# Patient Record
Sex: MALE | Race: BLACK OR AFRICAN AMERICAN | NOT HISPANIC OR LATINO | Employment: FULL TIME | URBAN - METROPOLITAN AREA
[De-identification: names, ages, dates, MRNs, and addresses within clinical notes are randomized per-mention and may not be internally consistent; named-entity substitution may affect disease eponyms.]

---

## 2017-07-21 ENCOUNTER — TELEPHONE (OUTPATIENT)
Dept: PHYSICAL MEDICINE AND REHAB | Facility: CLINIC | Age: 27
End: 2017-07-21

## 2017-07-21 NOTE — TELEPHONE ENCOUNTER
Call placed. Spoke with Gurpreet. He states on 12/23/14 Dr. Cabrera gave him a letter of clearance to return to work after his concussion. His place of employment has lost this documentation and they are requesting a new letter stating Gurpreet was cleared on 12/23/14. He would like this emailed to him at [email or web address].

## 2017-07-25 ENCOUNTER — TELEPHONE (OUTPATIENT)
Dept: PHYSICAL MEDICINE AND REHAB | Facility: CLINIC | Age: 27
End: 2017-07-25

## 2017-07-25 NOTE — TELEPHONE ENCOUNTER
----- Message from Kiarra Claros sent at 7/25/2017 11:03 AM CDT -----  Contact: Patient  Patient is returning your call. Please call him at [phone].

## 2017-07-25 NOTE — TELEPHONE ENCOUNTER
Call placed. Spoke with Gurpreet. He's requested an updated note on script pad stating he was cleared to return to work on 12/23/14 without restrictions. I will have DR Cabrera rewrite script and email it to him. [email or web address]. He verbalized understanding.

## 2018-04-08 ENCOUNTER — HOSPITAL ENCOUNTER (EMERGENCY)
Facility: HOSPITAL | Age: 28
Discharge: HOME | End: 2018-04-08
Attending: EMERGENCY MEDICINE
Payer: COMMERCIAL

## 2018-04-08 VITALS
SYSTOLIC BLOOD PRESSURE: 113 MMHG | DIASTOLIC BLOOD PRESSURE: 78 MMHG | HEIGHT: 66 IN | RESPIRATION RATE: 12 BRPM | HEART RATE: 73 BPM | TEMPERATURE: 99 F | WEIGHT: 150 LBS | OXYGEN SATURATION: 97 % | BODY MASS INDEX: 24.11 KG/M2

## 2018-04-08 DIAGNOSIS — M54.50 ACUTE LEFT-SIDED LOW BACK PAIN WITHOUT SCIATICA: Primary | ICD-10-CM

## 2018-04-08 PROCEDURE — 99283 EMERGENCY DEPT VISIT LOW MDM: CPT

## 2018-04-08 RX ORDER — ORPHENADRINE CITRATE 100 MG/1
100 TABLET, EXTENDED RELEASE ORAL 2 TIMES DAILY
Qty: 15 TABLET | Refills: 0 | Status: SHIPPED | OUTPATIENT
Start: 2018-04-08 | End: 2018-04-18

## 2018-04-08 RX ORDER — IBUPROFEN 600 MG/1
600 TABLET ORAL EVERY 6 HOURS PRN
Qty: 15 TABLET | Refills: 0 | OUTPATIENT
Start: 2018-04-08 | End: 2019-02-23

## 2018-04-09 NOTE — ED NOTES
Pt presents to the ED w/ c/o of lower back pain for the past 3-4days. Pt denies injury to back or lifting heavy objects. Reports pain is relieved by laying down or sitting up. Reports was given shot and the pain was relieved. Pt denies taking any medication to relieve the pain today. Reports has a hx of herniated disc.

## 2018-04-09 NOTE — ED PROVIDER NOTES
Encounter Date: 4/8/2018    SCRIBE #1 NOTE: I, Sheyla Reji, am scribing for, and in the presence of, Dr. Jim Glod.       History     Chief Complaint   Patient presents with    Back Pain     reports lower back pain X3-4 days. Denies any injury, or lifting any heavy objects. States pain is relieved by sitting/ laying down. states X 1 year ago was see by specialist and dx with possible herinated disc. Reports feels like the same pain. Pt states was given steroid shot, which relieved pain.      This is a 27 y.o. male who  has a past medical history of Anxiety; Concussion; Fatigue; Headache(784.0); and PTSD (post-traumatic stress disorder).    Time seen by provider: 8:54 PM    This patient presents to the emergency department today with complaint of lower back pain just to the left of the spine onset 3-4 days ago. He does not do any lifting that would strain his back and he does not recall any injury or trauma that may have caused his pain. The patient has a history of similar pain and last year he had an MRI which showed herniated or bulging disc. His pain is made better with sitting up straight or lying flat. His pain is made worse by standing too long or by slouching. The patient denies radiating pain to the leg, sides, or abdomen, dysuria, fever, chills, nausea. He has not tried any medications at home for his pain.    Patient has no past surgical history on file.       The history is provided by the patient.     Review of patient's allergies indicates:  No Known Allergies  Past Medical History:   Diagnosis Date    Anxiety     Concussion     Fatigue     Headache(784.0)     PTSD (post-traumatic stress disorder)      History reviewed. No pertinent surgical history.  Family History   Problem Relation Age of Onset    No Known Problems Mother     No Known Problems Sister     No Known Problems Brother     Melanoma Neg Hx     Psoriasis Neg Hx     Lupus Neg Hx      Social History   Substance Use Topics     Smoking status: Never Smoker    Smokeless tobacco: Never Used    Alcohol use No     Review of Systems   Constitutional: Negative for chills and fever.   HENT: Negative for congestion and sore throat.    Eyes: Negative for pain and redness.   Respiratory: Negative for cough and shortness of breath.    Cardiovascular: Negative for chest pain and leg swelling.   Gastrointestinal: Negative for abdominal pain, diarrhea, nausea and vomiting.   Genitourinary: Negative for dysuria and flank pain.   Musculoskeletal: Positive for back pain. Negative for neck pain.   Skin: Negative for rash and wound.   Neurological: Negative for weakness and numbness.       Physical Exam     Initial Vitals [04/08/18 2032]   BP Pulse Resp Temp SpO2   113/78 73 12 98.5 °F (36.9 °C) 97 %      MAP       89.67         Physical Exam    Nursing note and vitals reviewed.  Constitutional: He appears well-developed and well-nourished. He is not diaphoretic. No distress.   HENT:   Head: Normocephalic and atraumatic.   Eyes: EOM are normal. Pupils are equal, round, and reactive to light.   Neck: Normal range of motion. Neck supple.   Cardiovascular: Normal rate, regular rhythm and normal heart sounds. Exam reveals no gallop and no friction rub.    No murmur heard.  Pulmonary/Chest: Breath sounds normal. He has no wheezes. He has no rhonchi. He has no rales.   Abdominal: Soft. He exhibits no distension. There is no tenderness.   Musculoskeletal: Normal range of motion. He exhibits no edema.   Tenderness to the left lower lumbar paraspinous muscle. No vertebral tenderness.   Neurological: He is alert and oriented to person, place, and time. He has normal strength and normal reflexes. No sensory deficit.   Skin: Skin is warm and dry. No rash noted. No erythema.   Psychiatric: He has a normal mood and affect.         ED Course   Procedures  Labs Reviewed - No data to display          Medical Decision Making:   ED Management:  27-year-old male with lower  back pain.  No known injury.  Patient has tenderness of the left lower lumbar paraspinous muscle.  He has no neurologic deficits.  I will place him on ibuprofen and Norflex for this.  I have suggested he follow up with his primary physician when able for recheck and further treatment as warranted.                      Clinical Impression:     1. Acute left-sided low back pain without sciatica         Disposition:   Disposition: Discharged    I, Dr. Jim Mosher, personally performed the services described in this documentation. All medical record entries made by the scribe were at my direction and in my presence. I have reviewed the chart and agree that the record reflects my personal performance and is accurate and complete. Jim Mosher MD.  9:36 PM 04/08/2018                     Jim Mosher MD  04/08/18 [id number]

## 2019-02-22 ENCOUNTER — HOSPITAL ENCOUNTER (EMERGENCY)
Facility: HOSPITAL | Age: 29
Discharge: HOME | End: 2019-02-23
Attending: EMERGENCY MEDICINE
Payer: COMMERCIAL

## 2019-02-22 DIAGNOSIS — M54.2 NECK PAIN: Primary | ICD-10-CM

## 2019-02-22 PROCEDURE — 63600175 PHARM REV CODE 636 W HCPCS: Performed by: EMERGENCY MEDICINE

## 2019-02-22 PROCEDURE — 99284 EMERGENCY DEPT VISIT MOD MDM: CPT | Mod: 25

## 2019-02-22 PROCEDURE — 96372 THER/PROPH/DIAG INJ SC/IM: CPT

## 2019-02-22 RX ORDER — KETOROLAC TROMETHAMINE 30 MG/ML
30 INJECTION, SOLUTION INTRAMUSCULAR; INTRAVENOUS
Status: COMPLETED | OUTPATIENT
Start: 2019-02-22 | End: 2019-02-22

## 2019-02-22 RX ADMIN — KETOROLAC TROMETHAMINE 30 MG: 30 INJECTION, SOLUTION INTRAMUSCULAR at 11:02

## 2019-02-23 VITALS
HEART RATE: 56 BPM | BODY MASS INDEX: 24.11 KG/M2 | WEIGHT: 150 LBS | RESPIRATION RATE: 20 BRPM | SYSTOLIC BLOOD PRESSURE: 115 MMHG | HEIGHT: 66 IN | TEMPERATURE: 98 F | OXYGEN SATURATION: 96 % | DIASTOLIC BLOOD PRESSURE: 75 MMHG

## 2019-02-23 RX ORDER — CYCLOBENZAPRINE HCL 10 MG
10 TABLET ORAL NIGHTLY PRN
Qty: 7 TABLET | Refills: 0 | Status: SHIPPED | OUTPATIENT
Start: 2019-02-23 | End: 2019-02-28

## 2019-02-23 RX ORDER — IBUPROFEN 600 MG/1
600 TABLET ORAL EVERY 6 HOURS PRN
Qty: 42 TABLET | Refills: 0 | Status: SHIPPED | OUTPATIENT
Start: 2019-02-23 | End: 2019-03-22 | Stop reason: SDUPTHER

## 2019-02-23 NOTE — ED PROVIDER NOTES
Encounter Date: 2/22/2019    SCRIBE #1 NOTE: I, Keily Erazo, am scribing for, and in the presence of,  Dr. Peraza. I have scribed the entire note.       History     Chief Complaint   Patient presents with    Neck Pain     Patient presents to the ED with reports of having right sided neck pain that radiates down the right arm. Pain is described as aching. States having hx of disc problems and had similar episode October 2018. States he was treated with an injection.      Gurpreet Youngblood is a 28 y.o. male who  has a past medical history of Anxiety, Concussion, Fatigue, Headache(784.0), and PTSD (post-traumatic stress disorder).    The patient presents to the ED due to right sided neck pain that radiates down right arm since 12:30pm this afternoon. He reports he was sitting down eating when the pain occurred. He denies any numbness, weakness, or tingling sensation. He denies any chest pain, SOB, fever, nausea, headache or vomiting. The patient reports of of similar previous episodes and diagnosed with a herniated disc. He states he was treated with an injection. He has no known allergies. He has no other complaint at this time.          Review of patient's allergies indicates:  No Known Allergies  Past Medical History:   Diagnosis Date    Anxiety     Concussion     Fatigue     Headache(784.0)     PTSD (post-traumatic stress disorder)      Past Surgical History:   Procedure Laterality Date    EXAM UNDER ANESTHESIA N/A 4/29/2016    Performed [facility] at [facility] OR [facility]    FISTULOTOMY-RECTAL N/A 4/29/2016    Performed by [facility] at [facility] OR [facility]     Family History   Problem Relation Age of Onset    No Known Problems Mother     No Known Problems Sister     No Known Problems Brother     Melanoma Neg Hx     Psoriasis Neg Hx     Lupus Neg Hx      Social History     Tobacco Use    Smoking status: Never Smoker    Smokeless tobacco: Never Used   Substance Use Topics    Alcohol  use: No     Alcohol/week: 0.0 oz    Drug use: No     Review of Systems   Constitutional: Negative for chills and fever.   HENT: Negative for sore throat.    Respiratory: Negative for shortness of breath.    Cardiovascular: Negative for chest pain.   Gastrointestinal: Negative for constipation, diarrhea, nausea and vomiting.   Genitourinary: Negative for dysuria, frequency and urgency.   Musculoskeletal: Positive for neck pain. Negative for back pain.        (+) right arm pain   Skin: Negative for rash and wound.   Neurological: Negative for weakness and headaches.   Hematological: Does not bruise/bleed easily.   Psychiatric/Behavioral: Negative for agitation, behavioral problems and confusion.       Physical Exam     Initial Vitals [02/22/19 2240]   BP Pulse Resp Temp SpO2   132/81 62 18 98.7 °F (37.1 °C) 100 %      MAP       --         Physical Exam    Nursing note and vitals reviewed.  Constitutional: He appears well-developed and well-nourished. He is not diaphoretic. No distress.   HENT:   Head: Normocephalic and atraumatic.   Mouth/Throat: Oropharynx is clear and moist.   Eyes: EOM are normal. Pupils are equal, round, and reactive to light.   Neck: No tracheal deviation present.   Cardiovascular: Normal rate, regular rhythm, normal heart sounds and intact distal pulses.   Good radial and ulnar pulses   Pulmonary/Chest: Breath sounds normal. No stridor. No respiratory distress.   Abdominal: Soft. He exhibits no distension and no mass. There is no tenderness.   Musculoskeletal: Normal range of motion. He exhibits no edema.   Neurological: He is alert and oriented to person, place, and time. No cranial nerve deficit or sensory deficit.   Sensation intact to light touch to b/l upper extremities    Equal  strength bilaterally    Diffuse lateral c spine tenderness/ ttp to right arm, no forearm tenderness       Skin: Skin is warm and dry. Capillary refill takes less than 2 seconds. No rash noted.   Psychiatric:  He has a normal mood and affect. His behavior is normal. Thought content normal.         ED Course   Procedures  Labs Reviewed - No data to display       Imaging Results          CT Cervical Spine Without Contrast (Final result)  Result time 02/23/19 01:16:35    Final result by Danish Topete MD (02/23/19 01:16:35)                 Impression:      No CT evidence of acute fracture or traumatic subluxation of the cervical spine.      Electronically signed by: Danish Topete MD  Date:    02/23/2019  Time:    01:16             Narrative:    EXAMINATION:  CT CERVICAL SPINE WITHOUT CONTRAST    CLINICAL HISTORY:  Neck pain, first study;    TECHNIQUE:  Low dose axial images, sagittal and coronal reformations were performed though the cervical spine.  Contrast was not administered.    COMPARISON:  MRI cervical spine 12/10/2014    FINDINGS:  Cervical spine alignment appears within normal limits.  Cervical vertebral body heights are well maintained.  There is no acute fracture.  Intervertebral disc spaces appear within normal limits.  There is no CT evidence of significant spinal canal stenosis or neural foraminal narrowing.    The prevertebral soft tissues appear within normal limits.  The remaining visualized soft tissue structures of the neck are unremarkable.  Visualized intracranial structures at the skullbase appear within normal limits.  The visualized airway and lung apices are unremarkable.                                 Medical Decision Making:   Differential Diagnosis:   Differential Diagnosis includes, but is not limited to:  Fracture, dislocation, nerve injury/palsy, vascular injury, muscle strain, ligament tear/sprain, muscle spasm abrasion, soft tissue contusion, osteoarthritis.    Clinical Tests:   Radiological Study: Ordered and Reviewed  ED Management:  CT negative for acute pathology    I do not suspect emergent pathology, neuro exam is intact, patient improved after toradol    Will treat for muscle  strain    Advised he will need to follow up with PCP if he has persistent pain    Return precautions for numbness, weakness or any other concern.    After taking into careful account the historical factors and physical exam findings of the patient's presentation today, in conjunction with the empirical and objective data obtained on ED workup, no acute emergent medical condition has been identified. The patient appears to be low risk for an emergent medical condition and I feel it is safe and appropriate at this time for the patient to be discharged to follow-up as detailed in their discharge instructions for reevaluation and possible continued outpatient workup and management. I have discussed the specifics of the workup with the patient and the patient has verbalized understanding of the details of the workup, the diagnosis, the treatment plan, and the need for outpatient follow-up.  Although the patient has no emergent etiology today this does not preclude the development of an emergent condition so in addition, I have advised the patient that they can return to the ED and/or activate EMS at any time with worsening of their symptoms, change of their symptoms, or with any other medical complaint.  The patient remained comfortable and stable during their visit in the ED.  Discharge and follow-up instructions discussed with the patient who expressed understanding and willingness to comply with my recommendations.                        Clinical Impression:     1. Neck pain        Disposition:   Disposition: Discharged  Condition: Stable    I, Zack Peraza,  personally performed the services described in this documentation. All medical record entries made by the scribe were at my direction and in my presence.  I have reviewed the chart and agree that the record reflects my personal performance and is accurate and complete. Zack Peraza M.D. 5:03 PM02/25/2019 Scribe attestation                    Zack Peraza  MD Jose  02/25/19 [id number]

## 2019-02-23 NOTE — ED TRIAGE NOTES
Patient presents to ER with complaints of neck pain that radiates down right arm. States pain started this morning and is a 5/10 on a 1-10 pain scale. Denies chest pain, denies SOB, denies N/V. Full ROM of neck present. Tender upon palpation. Vitals stable, no distress noted.

## 2019-03-15 ENCOUNTER — TELEPHONE (OUTPATIENT)
Dept: ORTHOPEDICS | Facility: CLINIC | Age: 29
End: 2019-03-15

## 2019-03-15 NOTE — PROGRESS NOTES
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 Pt gave verbal understanding, and I scheduled him an appt with Lucia on 3/22. I also informed pt I will be mailing his appt letter to him.

## 2019-03-20 DIAGNOSIS — M50.30 DDD (DEGENERATIVE DISC DISEASE), CERVICAL: Primary | ICD-10-CM

## 2019-03-22 ENCOUNTER — INITIAL CONSULT (OUTPATIENT)
Dept: ORTHOPEDICS | Facility: CLINIC | Age: 29
End: 2019-03-22
Payer: COMMERCIAL

## 2019-03-22 ENCOUNTER — HOSPITAL ENCOUNTER (OUTPATIENT)
Dept: RADIOLOGY | Facility: HOSPITAL | Age: 29
Discharge: HOME | End: 2019-03-22
Attending: PHYSICIAN ASSISTANT
Payer: COMMERCIAL

## 2019-03-22 VITALS — WEIGHT: 154.63 LBS | BODY MASS INDEX: 24.85 KG/M2 | HEIGHT: 66 IN

## 2019-03-22 DIAGNOSIS — M50.30 DDD (DEGENERATIVE DISC DISEASE), CERVICAL: ICD-10-CM

## 2019-03-22 DIAGNOSIS — M54.12 CERVICAL RADICULOPATHY: Primary | ICD-10-CM

## 2019-03-22 PROCEDURE — 72040 X-RAY EXAM NECK SPINE 2-3 VW: CPT | Mod: TC

## 2019-03-22 PROCEDURE — 3008F PR BODY MASS INDEX (BMI) DOCUMENTED: ICD-10-PCS | Mod: CPTII,S$GLB,, | Performed by: PHYSICIAN ASSISTANT

## 2019-03-22 PROCEDURE — 72040 XR CERVICAL SPINE AP LATERAL: ICD-10-PCS | Mod: 26,,, | Performed by: RADIOLOGY

## 2019-03-22 PROCEDURE — 99999 PR PBB SHADOW E&M-EST. PATIENT-LVL III: ICD-10-PCS | Mod: PBBFAC,,, | Performed by: PHYSICIAN ASSISTANT

## 2019-03-22 PROCEDURE — 99204 PR OFFICE/OUTPT VISIT, NEW, LEVL IV, 45-59 MIN: ICD-10-PCS | Mod: S$GLB,,, | Performed by: PHYSICIAN ASSISTANT

## 2019-03-22 PROCEDURE — 3008F BODY MASS INDEX DOCD: CPT | Mod: CPTII,S$GLB,, | Performed by: PHYSICIAN ASSISTANT

## 2019-03-22 PROCEDURE — 72040 X-RAY EXAM NECK SPINE 2-3 VW: CPT | Mod: 26,,, | Performed by: RADIOLOGY

## 2019-03-22 PROCEDURE — 99999 PR PBB SHADOW E&M-EST. PATIENT-LVL III: CPT | Mod: PBBFAC,,, | Performed by: PHYSICIAN ASSISTANT

## 2019-03-22 PROCEDURE — 99204 OFFICE O/P NEW MOD 45 MIN: CPT | Mod: S$GLB,,, | Performed by: PHYSICIAN ASSISTANT

## 2019-03-22 RX ORDER — BICTEGRAVIR SODIUM, EMTRICITABINE, AND TENOFOVIR ALAFENAMIDE FUMARATE 50; 200; 25 MG/1; MG/1; MG/1
TABLET ORAL
Refills: 5 | COMMUNITY
Start: 2019-03-08 | End: 2019-04-01

## 2019-03-22 RX ORDER — IBUPROFEN 600 MG/1
600 TABLET ORAL EVERY 6 HOURS PRN
Qty: 90 TABLET | Refills: 0 | Status: SHIPPED | OUTPATIENT
Start: 2019-03-22 | End: 2019-11-04 | Stop reason: ALTCHOICE

## 2019-03-22 RX ORDER — CEPHALEXIN 500 MG/1
CAPSULE ORAL
COMMUNITY
Start: 2019-02-18 | End: 2019-11-21

## 2019-03-22 RX ORDER — PREDNISONE 10 MG/1
TABLET ORAL
COMMUNITY
Start: 2019-02-18 | End: 2019-11-21 | Stop reason: CLARIF

## 2019-03-22 RX ORDER — PROMETHAZINE HYDROCHLORIDE 6.25 MG/5ML
SYRUP ORAL
COMMUNITY
Start: 2019-02-22 | End: 2019-11-21 | Stop reason: CLARIF

## 2019-03-22 NOTE — PROGRESS NOTES
DATE: 3/22/2019  PATIENT: Gurpreet Youngblood    Supervising Physician: Amos Segovia M.D.    CHIEF COMPLAINT: neck and right arm pain    HISTORY:  Gurpreet Youngblood is a 28 y.o. male who [place] [employer] [place] for initial evaluation of neck and right arm pain (Neck - 6, Arm - 6). The pain has been present for about 6 weeks although he reports similar symptoms in 2016.  He was seen in the ED 2/22 for the same pain. He was discharged home with ibuprofen.  The pain has kept him out of work.  The patient describes the pain as throbbing. The pain is worse with turning is head toward the left and with activity and improved by rest and ibuprofen. There is associated numbness and tingling. There is no subjective weakness. Prior treatments have included ibuprofen, tylenol and an RIP in 2016, but no recent ESIs or surgery.     The patient denies myelopathic symptoms such as handwriting changes or difficulty with buttons/coins/keys. Denies perineal paresthesias, bowel/bladder dysfunction.    PAST MEDICAL/SURGICAL HISTORY:  Past Medical History:   Diagnosis Date    Anxiety     Concussion     Fatigue     Headache(784.0)     PTSD (post-traumatic stress disorder)      Past Surgical History:   Procedure Laterality Date    EXAM UNDER ANESTHESIA N/A 4/29/2016    Performed by [facility] at [facility] OR [address] [place] [address]    FISTULOTOMY-RECTAL N/A 4/29/2016    Performed by JOAQUIN Clifton MD at [facility] OR [address] [place] [address]       Medications:  Current Outpatient Medications on File Prior to Visit   Medication Sig Dispense Refill    BIKTARVY -25 mg per tablet TAKE 1 TABLET(S) EVERY DAY BY ORAL ROUTE.  5    cephALEXin (KEFLEX) 500 MG capsule       [DISCONTINUED] ibuprofen (ADVIL,MOTRIN) 600 MG tablet Take 1 tablet (600 mg total) by mouth every 6 (six) hours as needed for Pain. 42 tablet 0    predniSONE (DELTASONE) 10 MG tablet       promethazine (PHENERGAN) 6.25 mg/5 mL syrup        No current facility-administered  "medications on file prior to visit.        Social History:   Social History     Socioeconomic History    Marital status: Single     Spouse name: Not on file    Number of children: 0    Years of education: Not on file    Highest education level: Not on file   Social Needs    Financial resource strain: Not on file    Food insecurity - worry: Not on file    Food insecurity - inability: Not on file    Transportation needs - medical: Not on file    Transportation needs - non-medical: Not on file   Occupational History    Not on file   Tobacco Use    Smoking status: Never Smoker    Smokeless tobacco: Never Used   Substance and Sexual Activity    Alcohol use: No     Alcohol/week: 0.0 oz    Drug use: No    Sexual activity: No   Other Topics Concern    Patient feels they ought to cut down on drinking/drug use Not Asked    Patient annoyed by others criticizing their drinking/drug use Not Asked    Patient has felt bad or guilty about drinking/drug use Not Asked    Patient has had a drink/used drugs as an eye opener in the AM Not Asked   Social History Narrative    Not on file       REVIEW OF SYSTEMS:  Constitution: Negative. Negative for chills, fever and night sweats.   Cardiovascular: Negative for chest pain and syncope.   Respiratory: Negative for cough and shortness of breath.   Gastrointestinal: See HPI. Negative for nausea/vomiting. Negative for abdominal pain.  Genitourinary: See HPI. Negative for discoloration or dysuria.  Skin: Negative for dry skin, itching and rash.   Hematologic/Lymphatic: Negative for bleeding problem. Does not bruise/bleed easily.   Musculoskeletal: Negative for falls and muscle weakness.   Neurological: See HPI. No seizures.   Endocrine: Negative for polydipsia, polyphagia and polyuria.   Allergic/Immunologic: Negative for hives and persistent infections.  Psychiatric/Behavioral: Negative for depression and insomnia.         EXAM:  Ht 5' 6.34" (1.685 m)   Wt 70.1 kg (154 lb " 10.4 oz)   BMI 24.71 kg/m²     General: The patient is a very pleasant 28 y.o. male in no apparent distress, the patient is oriented to person, place and time.  Psych: Normal mood and affect  HEENT: Vision grossly intact, hearing intact to the spoken word.  Lungs: Respirations unlabored.  Gait: Normal station and gait, no difficulty with toe or heel walk.   Skin: Cervical skin negative for rashes, lesions, hairy patches and surgical scars.  Range of motion: Cervical range of motion is acceptable. There is mild right trapezial tenderness to palpation.  Spinal Balance: Global saggital and coronal spinal balance acceptable, no significant for scoliosis and kyphosis.  Musculoskeletal: No pain with the range of motion of the bilateral shoulders and elbows. Normal bulk and contour of the bilateral hands.  Vascular: Bilateral hands warm and well perfused, radial pulses 2+ bilaterally.  Neurological: Normal strength and tone in all major motor groups in the bilateral upper and lower extremities. Normal sensation to light touch in the C5-T1 and L2-S1 dermatomes bilaterally.  Deep tendon reflexes symmetric 2+ in the bilateral upper and lower extremities.  Negative Inverted Radial Reflex and Lee's bilaterally. Negative Babinski bilaterally.     IMAGING:   Today I personally reviewed AP and Lat upright C-spine films that demonstrate normal disc spacing and alignment.  No acute abnormalities.      CT cervical spine shows no fractures or acute abnormalities.      Body mass index is 24.71 kg/m².    No results found for: HGBA1C        ASSESSMENT/PLAN:    Gurpreet was seen today for back pain.    Diagnoses and all orders for this visit:    Cervical radiculopathy  -     MRI Cervical Spine Without Contrast; Future    Other orders  -     ibuprofen (ADVIL,MOTRIN) 600 MG tablet; Take 1 tablet (600 mg total) by mouth every 6 (six) hours as needed for Pain.        MRI cervical spine.  Follow up after the MRI to discuss results and further  treatment including possibly ESIs.       Follow-up if symptoms worsen or fail to improve.

## 2019-03-29 ENCOUNTER — HOSPITAL ENCOUNTER (OUTPATIENT)
Dept: RADIOLOGY | Facility: HOSPITAL | Age: 29
Discharge: HOME | End: 2019-03-29
Attending: PHYSICIAN ASSISTANT
Payer: COMMERCIAL

## 2019-03-29 DIAGNOSIS — M54.12 CERVICAL RADICULOPATHY: ICD-10-CM

## 2019-03-29 PROCEDURE — 72141 MRI CERVICAL SPINE WITHOUT CONTRAST: ICD-10-PCS | Mod: 26,,, | Performed by: RADIOLOGY

## 2019-03-29 PROCEDURE — 72141 MRI NECK SPINE W/O DYE: CPT | Mod: 26,,, | Performed by: RADIOLOGY

## 2019-03-29 PROCEDURE — 72141 MRI NECK SPINE W/O DYE: CPT | Mod: TC

## 2019-04-01 ENCOUNTER — PATIENT MESSAGE (OUTPATIENT)
Dept: ORTHOPEDICS | Facility: CLINIC | Age: 29
End: 2019-04-01

## 2019-04-05 ENCOUNTER — OFFICE VISIT (OUTPATIENT)
Dept: ORTHOPEDICS | Facility: CLINIC | Age: 29
End: 2019-04-05
Payer: COMMERCIAL

## 2019-04-05 VITALS — WEIGHT: 153.44 LBS | HEIGHT: 66 IN | BODY MASS INDEX: 24.66 KG/M2

## 2019-04-05 DIAGNOSIS — M54.12 CERVICAL RADICULOPATHY: Primary | ICD-10-CM

## 2019-04-05 PROCEDURE — 99999 PR PBB SHADOW E&M-EST. PATIENT-LVL III: ICD-10-PCS | Mod: PBBFAC,,, | Performed by: PHYSICIAN ASSISTANT

## 2019-04-05 PROCEDURE — 99213 PR OFFICE/OUTPT VISIT, EST, LEVL III, 20-29 MIN: ICD-10-PCS | Mod: S$GLB,,, | Performed by: PHYSICIAN ASSISTANT

## 2019-04-05 PROCEDURE — 3008F BODY MASS INDEX DOCD: CPT | Mod: CPTII,S$GLB,, | Performed by: PHYSICIAN ASSISTANT

## 2019-04-05 PROCEDURE — 99213 OFFICE O/P EST LOW 20 MIN: CPT | Mod: S$GLB,,, | Performed by: PHYSICIAN ASSISTANT

## 2019-04-05 PROCEDURE — 3008F PR BODY MASS INDEX (BMI) DOCUMENTED: ICD-10-PCS | Mod: CPTII,S$GLB,, | Performed by: PHYSICIAN ASSISTANT

## 2019-04-05 PROCEDURE — 99999 PR PBB SHADOW E&M-EST. PATIENT-LVL III: CPT | Mod: PBBFAC,,, | Performed by: PHYSICIAN ASSISTANT

## 2019-04-05 RX ORDER — MELOXICAM 15 MG/1
15 TABLET ORAL DAILY
Qty: 30 TABLET | Refills: 0 | Status: SHIPPED | OUTPATIENT
Start: 2019-04-05 | End: 2019-05-05

## 2019-04-05 RX ORDER — BICTEGRAVIR SODIUM, EMTRICITABINE, AND TENOFOVIR ALAFENAMIDE FUMARATE 50; 200; 25 MG/1; MG/1; MG/1
TABLET ORAL
COMMUNITY
Start: 2019-04-01 | End: 2019-12-17

## 2019-04-05 NOTE — PROGRESS NOTES
"DATE: 4/5/2019  PATIENT: Gurpreet Youngblood    Attending Physician: Amos Segovia M.D.    HISTORY:  Gurpreet Youngblood is a 28 y.o. male who returns to me today for MRI results.  He was last seen by me 3/22/2019.  Today he is doing well but notes his pain is 0/10.  He says the ibuprofen gives him good relief but it makes him sleepy.     The Patient denies myelopathic symptoms such as handwriting changes or difficulty with buttons/coins/keys. Denies perineal paresthesias, bowel/bladder dysfunction.    PMH/PSH/FamHx/SocHx:  Unchanged from prior visit    ROS:  REVIEW OF SYSTEMS:  Constitution: Negative. Negative for chills, fever and night sweats.   HENT: Negative for congestion and headaches.    Eyes: Negative for blurred vision, left vision loss and right vision loss.   Cardiovascular: Negative for chest pain and syncope.   Respiratory: Negative for cough and shortness of breath.    Endocrine: Negative for polydipsia, polyphagia and polyuria.   Hematologic/Lymphatic: Negative for bleeding problem. Does not bruise/bleed easily.   Skin: Negative for dry skin, itching and rash.   Musculoskeletal: Negative for falls and muscle weakness.   Gastrointestinal: Negative for abdominal pain and bowel incontinence.   Allergic/Immunologic: Negative for hives and persistent infections.  Genitourinary: Negative for urinary retention/incontinence and nocturia.   Neurological: Negative for disturbances in coordination, no myelopathic symptoms such as handwriting changes or difficulty with buttons, coins, keys or small objects. No loss of balance and seizures.   Psychiatric/Behavioral: Negative for depression. The patient does not have insomnia.   Denies myelopathic symptoms, perineal paresthesias, bowel or bladder incontinence    EXAM:  Ht 5' 6.34" (1.685 m)   Wt 69.6 kg (153 lb 7 oz)   BMI 24.51 kg/m²     Physical exam stable.  Neuro exam stable.     IMAGING:  No new imaging today.    Today I personally re-reviewed AP, " Lat and Flex/Ex  upright C-spine films that demonstrate normal disc spacing and alignment.  No acute abnormalities.    MRI cervical spine demonstrates no significant spinal canal or neural foraminal narrowing.      Body mass index is 24.51 kg/m².    No results found for: HGBA1C      ASSESSMENT/PLAN:    Gurpreet was seen today for follow-up.    Diagnoses and all orders for this visit:    Cervical radiculopathy  -     Ambulatory Referral to Physical/Occupational Therapy    Other orders  -     meloxicam (MOBIC) 15 MG tablet; Take 1 tablet (15 mg total) by mouth once daily.        There is no surgical intervention indicated. Referral for PT placed today.  We will try mobic.  Follow up as needed.      Follow up if symptoms worsen or fail to improve.

## 2019-04-09 ENCOUNTER — CLINICAL SUPPORT (OUTPATIENT)
Dept: REHABILITATION | Facility: HOSPITAL | Age: 29
End: 2019-04-09
Payer: COMMERCIAL

## 2019-04-09 DIAGNOSIS — M25.611 DECREASED ROM OF RIGHT SHOULDER: ICD-10-CM

## 2019-04-09 DIAGNOSIS — M54.2 NECK PAIN: ICD-10-CM

## 2019-04-09 PROCEDURE — 97161 PT EVAL LOW COMPLEX 20 MIN: CPT | Mod: PN

## 2019-04-09 NOTE — PLAN OF CARE
[facility]  Physical Therapy Initial Evaluation    Name: Gurpreet Youngblood  Clinic Number: [id number]    Therapy Diagnosis:   Encounter Diagnoses   Name Primary?    Neck pain     Decreased ROM of right shoulder      Physician: Lucia Arreola PA-C    Physician Orders: PT Eval and Treat   Medical Diagnosis: M54.12 (ICD-10-CM) - Cervical radiculopathy  Evaluation Date: 4/9/2019   Authorization Period: 04/04/2020  Plan of Care Certification Period: 04/09/2019 to 05/07/2019  Visit # / Visits authorized: 1/ TBD    Time In: 0720  Time Out: 0800  Total Billable Time: 40 minutes (1 LCE)  Precautions: Standard    Subjective   Gurpreet presents to PT today.  Referred by Orthopedics. Onset of R-sided neck pain while at work several weeks ago.  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 This job includes periods of prolonged standing and lifting/sorting luggage.  That particular day he was sitting at the baggage Xray machine 'just pushing a button' repeatedly for hours with his R hand. ED visit on 02/22/2019; see visit note. Currently taking Meloxicam daily; states that this helps alleviated his pain.  If he does not take the medicine, his neck will hurt more. Currently not working; on light duty but states that 'my work doesn't have light duty'. Desirous of returning to work.  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  Noted temperature changes in RUE with onset of neck pain but denies shoulder pain, radicular pain, or weakness in RUE.  Denies headaches. Denies morning pain. Pain also exacerbated by talking on the phone (holding on R-side), standing up too long at home to clean, grocery store lifting objects.      Past Medical History:   Diagnosis Date    Anxiety     Concussion     Fatigue     Headache(784.0)     PTSD (post-traumatic stress disorder)      Gurpreet Youngblood  has no past surgical history  on file.    Gurpreet has a current medication list which includes the following prescription(s): biktarvy, cephalexin, ibuprofen, meloxicam, prednisone, and promethazine.    Review of patient's allergies indicates:  No Known Allergies     Imaging: multiple various C-spine imaging studies; see reports in Imaging section of EMR.   Prior Therapy: none  Social History: Lives at home.  Does not exercise.  Occupation: see above.   Prior Level of Function: one episode of similar neck pain that occurred last year; resolved with rest and activity modification.   Current Level of Function: see above    Pain:  Current 2/10, worst 6/10, best 2/10   Location: right neck.   Description: Grabbing, Deep and Sharp    Pts goals: to return to work without R-sided neck pain.     Objective     Palpation:  TTP along R posterolateral neck along the course of his levator scapulae.  No pain with palpation of supraclavicular area lateral, supraspinous fossa and infraspinous fossa area, or subacromial area.      Posture:  Cervical: mild forward head posturing. Thoracic: mild increased in thoracic kyphosis but this increases significantly in relaxed, unsupported short-sitting.  B scapular DR with depression but without humeral IR compensation.     Gross Movement:  -Gait: non-antalgic  -Shoulder flexion: lacks terminal R shoulder flexion as compared to L.   -Cervical quadrant test: pain with flexion    Cervicothoracic Range of Motion:    Degrees Pain   Flexion WNL Pain R neck   Extension 45 NP   Right Rotation WNL Faulty movement pattern with RSB   Left Rotation WNL Pain R neck   Right Side Bending WNL Pain R neck   Left Side Bending WNL Pain R neck   Thoracic  25% limitation into extension WNL        UE Range of Motion:   Shoulder Left Right   Shoulder Flexion ; firm end-feel   Shoulder ER 70 WNL   Shoulder IR WNL WNL   Elbow WNL WNL   Wrist WNL WNL     Cervical Strength:  Cervical MMT   Flexion 4/5   Extension 4/5   Right Side Bend 4/5    Left Side Bend 4/5   Cranio-cervical flexion test:  Not tested  Cervical flexor endurance test: >15 seconds but <30 seconds hold ability.     Upper Extremity Strength:  (L) UE  (R) UE    Shoulder flexion: 5/5 Shoulder flexion: 5/5   Shoulder Abduction: 5/5 Shoulder abduction: 5/5   Shoulder ER 5/5 Shoulder ER 5/5   Shoulder IR 5/5 Shoulder IR 5/5   Elbow flexion: 5/5 Elbow flexion: 5/5   Elbow extension: 5/5 Elbow extension: 5/5   Wrist flexion: 5/5 Wrist flexion: 5/5   Wrist extension: 5/5 Wrist extension: 5/5    5/5 : 5/5   Lower Trap 5/5 Lower Trap 5/5   Middle Trap 5/5 Middle Trap 5/5   Serratus Anterior 5/5 Serratus Anterior 5/5   Rhomboids 5/5 Rhomboids 5/5       Special Tests:  - Spurling's test: + for pain on the R but without radicular pain.  - Distraction test: negative  - Sam-Hesham test: negative B  - Neer's impingement test: negative B  - Speed's test: negative B    Joint Mobility: Pain with C4-7 right lateral side glides.  Pain with C5-7 central PA mobs.  Hypomobility C7/T1 segment.                              Stiffness into cervical retraction with passive assessment.    Sensation: Normal light touch sensation C4-T2 throughout BUE      CMS Impairment/Limitation/Restriction for FOTO Neck Survey    Therapist reviewed FOTO scores for Gurpreet Youngblood on 4/9/2019.   FOTO documents entered into [employer] - see Media section.    Limitation Score: 27%  Predicted Limitation Score: 19%         TREATMENT   No treatment today.  Next visit: cervical manual traction, cervical retractions, thoracic extension mobility, R shoulder flexion stretching.     Home Exercises and Patient Education Provided  Education provided re:   - progress towards goals   - role of therapy in multi - disciplinary team, goals for therapy  No spiritual or educational barriers to learning provided    Written Home Exercises Provided: not today.     Assessment   Gurpreet is a 28 y.o. male referred to outpatient Physical Therapy  with a medical diagnosis of right-sided neck pain. Pt presents with limitation of end-range R shoulder flexion resulting in compensatory shoulder hiking during repeated AROM flexion/reaching, loss of joint mobility with right mid-to-lower cervical segmental testing, habitual postural faults, and pain with end-range cervical flexion and right rotation.  Currently his cervical dysfunction is resulting in his inability to perform the full spectrum of his work/job demands.     Pt prognosis is Excellent.   Pt will benefit from skilled outpatient Physical Therapy to address the deficits stated above and in the chart below, provide pt/family education, and to maximize pt's level of independence.     Plan of care discussed with patient: Yes  Pt's spiritual, cultural and educational needs considered and pt agreeable to plan of care and goals as stated below:     Anticipated Barriers for therapy: none    Medical Necessity is demonstrated by the following  History  Co-morbidities and personal factors that may impact the plan of care Co-morbidities: Arthritis, Back pain    Personal Factors:   no deficits     low   Examination  Body Structures and Functions, activity limitations and participation restrictions that may impact the plan of care Body Regions:   neck    Body Systems:    ROM  strength  transfers  transitions  motor control  motor learning    Participation Restrictions:   none    Activity limitations:   Learning and applying knowledge  no deficits    General Tasks and Commands  no deficits    Communication  no deficits    Mobility  lifting and carrying objects    Self care  washing oneself (bathing, drying, washing hands)    Domestic Life  shopping  cooking  doing house work (cleaning house, washing dishes, laundry)  assisting others    Interactions/Relationships  no deficits    Life Areas  employment    Community and Social Life  no deficits         low   Clinical Presentation stable and uncomplicated low   Decision  Making/ Complexity Score: low     Goals:  Short Term Goals: 6 weeks:  1. Patient will demonstrate normal R cervical AROM with overpressure without pain.   2. Patient will demonstrate >170 degrees of R shoulder flexion for improved reaching overhead ability  3. Patient will report <3/10 cervical pain at worst consistently during his work week.   4. Patient will demonstrate no pain with Spurling's test on the R.   5. Patient will report <15% limitation on Neck FOTO survey.       Plan   Certification Period: 4/9/2019 to 05/07/2019.    Outpatient Physical Therapy 2 times weekly for 6 weeks to include the following interventions: Cervical/Lumbar Traction, Manual Therapy, Moist Heat/ Ice, Neuromuscular Re-ed, Orthotic Management and Training, Patient Education, Self Care, Therapeutic Activites and Therapeutic Exercise, Dry NeedMORENO peng.     Lg Anderson, PT

## 2019-04-10 PROBLEM — M54.2 NECK PAIN: Status: ACTIVE | Noted: 2019-04-10

## 2019-04-10 PROBLEM — M25.611 DECREASED ROM OF RIGHT SHOULDER: Status: ACTIVE | Noted: 2019-04-10

## 2019-04-12 ENCOUNTER — CLINICAL SUPPORT (OUTPATIENT)
Dept: REHABILITATION | Facility: HOSPITAL | Age: 29
End: 2019-04-12
Payer: COMMERCIAL

## 2019-04-12 DIAGNOSIS — M25.611 DECREASED ROM OF RIGHT SHOULDER: ICD-10-CM

## 2019-04-12 DIAGNOSIS — M54.2 NECK PAIN: ICD-10-CM

## 2019-04-12 PROCEDURE — 97140 MANUAL THERAPY 1/> REGIONS: CPT | Mod: PN | Performed by: PHYSICAL THERAPIST

## 2019-04-12 PROCEDURE — 97110 THERAPEUTIC EXERCISES: CPT | Mod: PN | Performed by: PHYSICAL THERAPIST

## 2019-04-12 NOTE — PROGRESS NOTES
"  Physical Therapy Daily Treatment Note     [facility]  [facility] Number: [id number]    Therapy Diagnosis:   Encounter Diagnoses   Name Primary?    Neck pain     Decreased ROM of right shoulder      Physician: Lucia Arreola PA-C    Visit Date: 4/12/2019    Physician Orders: PT Eval and Treat   Medical Diagnosis: M54.12 (ICD-10-CM) - Cervical radiculopathy  Evaluation Date: 4/9/2019   Authorization Period: 04/04/2020  Plan of Care Certification Period: 04/09/2019 to 05/07/2019  Visit # / Visits authorized: 1/ TBD      Time In: 1250 pm  Time Out: 145 pm  Total Billable Time: 55 minutes    Precautions: Standard    Subjective     Pt reports: R neck and upper trap pain with lifting, decreased headache frequency since not working.   He was compliant with home exercise program.  Response to previous treatment: No adverse effects  Functional change: None    Pain: 3/10  Location: right neck      Objective     Gurpreet received the following manual therapy techniques: Joint mobilizations, Manual traction, Myofacial release and Soft tissue Mobilization were applied to the: cervical spine for 15 minutes, including:  -ASTYM to right side of upper trapezius and levator scap  -Manual cervical traction  -R upper trap stretching with pinning    Gurpreet received therapeutic exercises to develop strength, endurance, ROM, flexibility and posture for 40 minutes including:  Serratus punches  3# dowel 3x12  Sidelying ER   1# R only, 3x10  Sidelying Flexion  1# R only, 3x10  Scapular Retractions  Purple SC - 3x12  Straight arm pull downs GTB 2x12  Upper Trap stretch  30" x 3  Levator scap stretch  30" x 3    Home Exercises Provided and Patient Education Provided     Education provided:   - Continue cervical flexibility at home.       Assessment     Pt presents with moderate tenderness along R upper trap and levator scap. Pt able perform therex with only minimal discomfort but no discomfort upon leaving. Pt tolerated manual " treatments with tenderness along upper trap but decrease in pain following.    Gurpreet is progressing well towards his goals.   Pt prognosis is Excellent.     Pt will continue to benefit from skilled outpatient physical therapy to address the deficits listed in the problem list box on initial evaluation, provide pt/family education and to maximize pt's level of independence in the home and community environment.     Pt's spiritual, cultural and educational needs considered and pt agreeable to plan of care and goals.    Anticipated barriers to physical therapy: None    Goals:   Short Term Goals: 6 weeks:  1. Patient will demonstrate normal R cervical AROM with overpressure without pain. (Progressing, not met)  2. Patient will demonstrate >170 degrees of R shoulder flexion for improved reaching overhead ability (Progressing, not met)  3. Patient will report <3/10 cervical pain at worst consistently during his work week. (Progressing, not met)  4. Patient will demonstrate no pain with Spurling's test on the R. (Progressing, not met)  5. Patient will report <15% limitation on Neck FOTO survey. (Progressing, not met)      Plan     Continue plan of care to progress toward functional goals.     Tej Subramanian, PT

## 2019-04-17 ENCOUNTER — CLINICAL SUPPORT (OUTPATIENT)
Dept: REHABILITATION | Facility: HOSPITAL | Age: 29
End: 2019-04-17
Payer: COMMERCIAL

## 2019-04-17 DIAGNOSIS — M54.2 NECK PAIN: ICD-10-CM

## 2019-04-17 DIAGNOSIS — M25.611 DECREASED ROM OF RIGHT SHOULDER: ICD-10-CM

## 2019-04-17 PROCEDURE — 97110 THERAPEUTIC EXERCISES: CPT | Mod: PN

## 2019-04-17 PROCEDURE — 97140 MANUAL THERAPY 1/> REGIONS: CPT | Mod: PN

## 2019-04-17 NOTE — PROGRESS NOTES
"  Physical Therapy Daily Treatment Note     Name: Gurpreet Youngblood  Clinic Number: [id number]    Therapy Diagnosis:   Encounter Diagnoses   Name Primary?    Neck pain     Decreased ROM of right shoulder      Physician: Lucia Arreola PA-C    Visit Date: 4/17/2019    Physician Orders: PT Eval and Treat   Medical Diagnosis: M54.12 (ICD-10-CM) - Cervical radiculopathy  Evaluation Date: 4/9/2019   Authorization Period: 12/31/2019  Plan of Care Certification Period: 04/09/2019 to 05/07/2019  Visit # / Visits authorized: 3/50  FOTO: 3/5    Time In: 1510  Time Out: 1400  Total Billable Time: 45 minutes (2 TE, 1 MT)  Precautions: Standard    Subjective   Mr. Youngblood states that he is scheduled to return to work 5/12/2019.  Denies neck pain today.  States that he is bored at home and is ready to return to work.      He was mildly compliant with home exercise program.  Response to previous treatment: No adverse effects  Functional change: None  Pain: 1/10  Location: right neck      Objective   O:  Normal cervical clearing test today. Mild R lat dorsi stiffness today.     Gurpreet received the following manual therapy techniques: Joint mobilizations, Manual traction, Myofacial release and Soft tissue Mobilization were applied to the: cervical spine for 15 minutes, including:  -ASTYM to right side of upper trapezius and levator scap  -Manual cervical traction  -R upper trap stretching with pinning    Gurpreet received therapeutic exercises to develop strength, endurance, ROM, flexibility and posture for 40 minutes including:  Serratus punches  3# dowel 3x12  Sidelying ER   4# R only, 3x10  Sidelying Flexion  1# R only, 3x10  Sidelying shoulder abd 3#; 2x20  Scapular Retractions  Not performed today  Straight arm pull downs GTB 3x10  Prone scap retractions 20 reps x 3 second holds (cueing)  Prone shoulder ext  3# dumbell; 3x10  Prone rows   8# dumbell; 3x10  Upper Trap stretch  30" x 3  Levator scap stretch  30" x 3    Home " Exercises Provided and Patient Education Provided   Education provided:   - Continue cervical flexibility at home.  Re-instructed in the proper stretching technique  - addition of sidelying shoulder therex to HEP.       Assessment   A: Only experienced R supraclavicular area pain with R RTC fatigue during therex.     Gurpreet is progressing well towards his goals.   Pt prognosis is Excellent.     Pt will continue to benefit from skilled outpatient physical therapy to address the deficits listed in the problem list box on initial evaluation, provide pt/family education and to maximize pt's level of independence in the home and community environment.   Pt's spiritual, cultural and educational needs considered and pt agreeable to plan of care and goals.    Anticipated barriers to physical therapy: None    Goals:   Short Term Goals: 6 weeks:  1. Patient will demonstrate normal R cervical AROM with overpressure without pain. (Progressing, not met)  2. Patient will demonstrate >170 degrees of R shoulder flexion for improved reaching overhead ability (Progressing, not met)  3. Patient will report <3/10 cervical pain at worst consistently during his work week. (Progressing, not met)  4. Patient will demonstrate no pain with Spurling's test on the R. (Progressing, not met)  5. Patient will report <15% limitation on Neck FOTO survey. (Progressing, not met)      Plan   Continue plan of care to progress toward functional goals.     Lg Anderson, PT

## 2019-04-22 ENCOUNTER — CLINICAL SUPPORT (OUTPATIENT)
Dept: REHABILITATION | Facility: HOSPITAL | Age: 29
End: 2019-04-22
Payer: COMMERCIAL

## 2019-04-22 DIAGNOSIS — M25.611 DECREASED ROM OF RIGHT SHOULDER: ICD-10-CM

## 2019-04-22 DIAGNOSIS — M54.2 NECK PAIN: ICD-10-CM

## 2019-04-22 PROCEDURE — 97110 THERAPEUTIC EXERCISES: CPT | Mod: PN

## 2019-04-22 PROCEDURE — 97140 MANUAL THERAPY 1/> REGIONS: CPT | Mod: PN

## 2019-04-22 NOTE — PROGRESS NOTES
"  Physical Therapy Daily Treatment Note     [facility]  [facility] [id number]    Therapy Diagnosis:   Encounter Diagnoses   Name Primary?    Neck pain     Decreased ROM of right shoulder      Physician: Lucia Arreola PA-C    Visit Date: 4/22/2019    Physician Orders: PT Eval and Treat   Medical Diagnosis: M54.12 (ICD-10-CM) - Cervical radiculopathy  Evaluation Date: 4/9/2019   Authorization Period: 12/31/2019  Plan of Care Certification Period: 04/09/2019 to 05/07/2019  Visit # / Visits authorized: 4/50  FOTO: 4/5  PTA visit: 1/6     Time In: 1400  Time Out: 1455  Total Billable Time: 25 minutes (1 TE, 1 MT)  Precautions: Standard      Subjective     Pt reports: neck is feeling better with therapy.  Decreased pain  He was compliant with home exercise program.  Mostly stretches  Response to previous treatment: no adverse reaction  Functional change: none    Pain: 2/10  Location: right neck      Objective     Gurpreet received the following manual therapy techniques: Manual traction, Myofacial release and Soft tissue Mobilization were applied to the: R side neck for 10 minutes, including:    -ASTYM to right side of upper trapezius and levator scap NOT PERFORMED THIS TREATMENT  -Manual cervical traction  -R upper trap stretching with pinning    Gurpreet received therapeutic exercises to develop strength, ROM and flexibility for 45 minutes including:    Serratus punches                    3# dowel 3x12  Sidelying ER                           4# R only, 3x10  Sidelying Flexion                     3# R only, 2x10  Sidelying shoulder abd           3#; 2x20  Scapular Retractions                Straight arm pull downs          GTB 3x10  Prone scap retractions            20 reps x 3 second holds (cueing)  Prone shoulder ext                  3# dumbell; 3x10  Prone rows                              8# dumbell; 3x10  Upper Trap stretch                  30" x 3  Levator scap stretch               30" x " 3  Standing ER/IR                        W/RTB 3x10 R  Standing IR shld @90 deg      W/RTB 2x10 R    Home Exercises Provided and Patient Education Provided     Education provided:   - cont HEP regularly to maximize therapy benefits    Written Home Exercises Provided: Patient instructed to cont prior HEP.  Exercises were reviewed and Gurpreet was able to demonstrate them prior to the end of the session.  Gurpreet demonstrated good  understanding of the education provided.     See EMR under Patient Instructions for exercises provided 4/9/19.      Assessment     Pt tolerates therapy interventions with minimal c/o muscle burning/fatigue upon completion of therex  Gurpreet is progressing well towards his goals.   Pt prognosis is Excellent.     Pt will continue to benefit from skilled outpatient physical therapy to address the deficits listed in the problem list box on initial evaluation, provide pt/family education and to maximize pt's level of independence in the home and community environment.     Pt's spiritual, cultural and educational needs considered and pt agreeable to plan of care and goals.    Anticipated barriers to physical therapy: none    Goals:   Short Term Goals: 6 weeks:  1. Patient will demonstrate normal R cervical AROM with overpressure without pain. (Progressing, not met)  2. Patient will demonstrate >170 degrees of R shoulder flexion for improved reaching overhead ability (Progressing, not met)  3. Patient will report <3/10 cervical pain at worst consistently during his work week. (Progressing, not met)  4. Patient will demonstrate no pain with Spurling's test on the R. (Progressing, not met)  5. Patient will report <15% limitation on Neck FOTO survey. (Progressing, not met)      Plan     Cont POC to progress towards established goals    Hanane Glynn, PTA

## 2019-04-23 ENCOUNTER — PATIENT MESSAGE (OUTPATIENT)
Dept: ORTHOPEDICS | Facility: CLINIC | Age: 29
End: 2019-04-23

## 2019-04-24 ENCOUNTER — CLINICAL SUPPORT (OUTPATIENT)
Dept: REHABILITATION | Facility: HOSPITAL | Age: 29
End: 2019-04-24
Payer: COMMERCIAL

## 2019-04-24 ENCOUNTER — PATIENT MESSAGE (OUTPATIENT)
Dept: ORTHOPEDICS | Facility: CLINIC | Age: 29
End: 2019-04-24

## 2019-04-24 DIAGNOSIS — M25.611 DECREASED ROM OF RIGHT SHOULDER: ICD-10-CM

## 2019-04-24 DIAGNOSIS — M54.2 NECK PAIN: ICD-10-CM

## 2019-04-24 PROCEDURE — 97140 MANUAL THERAPY 1/> REGIONS: CPT | Mod: PN

## 2019-04-24 PROCEDURE — 97110 THERAPEUTIC EXERCISES: CPT | Mod: PN

## 2019-04-24 NOTE — PROGRESS NOTES
"  Physical Therapy Daily Treatment Note     Name: Gurpreet Youngblood  Clinic Number: [id number]    Therapy Diagnosis:   Encounter Diagnoses   Name Primary?    Neck pain     Decreased ROM of right shoulder      Physician: Lucia Arreola PA-C    Visit Date: 4/24/2019    Physician Orders: PT Eval and Treat   Medical Diagnosis: M54.12 (ICD-10-CM) - Cervical radiculopathy  Evaluation Date: 4/9/2019   Authorization Period: 12/31/2019  Plan of Care Certification Period: 04/09/2019 to 05/07/2019  Visit # / Visits authorized: 5/50  FOTO: 5/5 NEXT  PTA visit: 1/6     Time In: 1400  Time Out: 1500  Total Billable Time: 30 minutes (1 TE, 1 MT)  Precautions: Standard    Subjective   Mr. Vázquez presents to PT today with improving R-sided neck pain.    He was compliant with home exercise program.  Mostly stretches  Response to previous treatment: no adverse effects.   Functional change: none  Pain: 2/10  Location: right neck      Objective     Gurpreet received the following manual therapy techniques: Manual traction, Myofacial release and Soft tissue Mobilization were applied to the: R side neck for 15 minutes, including:  -SSTM/MFR right side of upper trapezius and levator scap   -Intermittent manual cervical traction  -R upper trap stretching with pinning    Gurpreet received therapeutic exercises to develop strength, ROM and flexibility for 15 minutes with PT 1:1 and 20 minutes under supervision including:  Serratus punches                      3# dowel 3x12  Sidelying ER                              4# R only, 3x10  Sidelying Flexion                       3# R only, 2x10  Sidelying shoulder abd              3#; 2x20  Scapular Retractions                Straight arm pull downs            GTB 3x10  Prone scap retractions              20 reps x 3 second holds (cueing)  Prone shoulder ext                    3# dumbell; 3x10  Prone rows                                8# dumbell; 3x10  Upper Trap stretch                    30" x " "3  Levator scap stretch                 30" x 3  Standing ER/IR                          W/RTB 3x10 R  Standing IR shld @90 deg        W/RTB 2x10 R    Home Exercises Provided and Patient Education Provided   Education provided:   - cont HEP regularly to maximize therapy benefits    Written Home Exercises Provided: Patient instructed to cont prior HEP.  Exercises were reviewed and Gurpreet was able to demonstrate them prior to the end of the session.  Gurpreet demonstrated good  understanding of the education provided.     See EMR under Patient Instructions for exercises provided 4/9/19.      Assessment   A: R-sided neck pain much improved with normalized ROM.  Improving scapular stabilizer strength.  Weak B biceps and triceps musculature.  Continue work hardening tasks for return to work expected 5/12/2019. Tight R teres major muscle.    Gurpreet is progressing well towards his goals.   Pt prognosis is Excellent.     Pt will continue to benefit from skilled outpatient physical therapy to address the deficits listed in the problem list box on initial evaluation, provide pt/family education and to maximize pt's level of independence in the home and community environment.   Pt's spiritual, cultural and educational needs considered and pt agreeable to plan of care and goals.    Anticipated barriers to physical therapy: none    Goals:   Short Term Goals: 6 weeks:  1. Patient will demonstrate normal R cervical AROM with overpressure without pain. MET  2. Patient will demonstrate >170 degrees of R shoulder flexion for improved reaching overhead ability (Progressing, not met)  3. Patient will report <3/10 cervical pain at worst consistently during his work week. MET  4. Patient will demonstrate no pain with Spurling's test on the R. (Progressing, not met)  5. Patient will report <15% limitation on Neck FOTO survey. (Progressing, not met)      Plan     Cont POC to progress towards established goals.      Lg Anderson, PT   "

## 2019-04-29 ENCOUNTER — CLINICAL SUPPORT (OUTPATIENT)
Dept: REHABILITATION | Facility: HOSPITAL | Age: 29
End: 2019-04-29
Payer: COMMERCIAL

## 2019-04-29 DIAGNOSIS — M25.611 DECREASED ROM OF RIGHT SHOULDER: ICD-10-CM

## 2019-04-29 DIAGNOSIS — M54.2 NECK PAIN: ICD-10-CM

## 2019-04-29 PROCEDURE — 97110 THERAPEUTIC EXERCISES: CPT | Mod: PN

## 2019-04-29 NOTE — PROGRESS NOTES
"  Physical Therapy Daily Treatment Note     Name: Gurpreet Youngblood  Clinic Number: [id number]    Therapy Diagnosis:   Encounter Diagnoses   Name Primary?    Neck pain     Decreased ROM of right shoulder      Physician: Lucia Arreola PA-C    Visit Date: 4/29/2019    Physician Orders: PT Eval and Treat   Medical Diagnosis: M54.12 (ICD-10-CM) - Cervical radiculopathy  Evaluation Date: 4/9/2019   Authorization Period: 12/31/2019  Plan of Care Certification Period: 04/09/2019 to 05/07/2019  Visit # / Visits authorized: 6/50  FOTO: 6/10   PTA visit: --     Time In: 1105  Time Out: 1200   Total Billable Time: 55 minutes (Therex- 4)    Precautions: Standard    Subjective     Pt reports: "I am doing better than I was". When he is stretching it doesn't feel at tight as it used to be.   He was compliant with home exercise program.  Response to previous treatment: mild soreness R upper trap/shoulder   Functional change: none reported    Pain: 0/10  Location: right cervical spine        Objective     O: mid cervical PIVM WNL       Upper trap sift tissue mobility WNL B    Gurpreet received therapeutic exercises to develop strength, ROM and flexibility for 55 minutes including:    Serratus punches                      4# dowel 3x12  Sidelying ER                              4# R only, 2x15  Sidelying Flexion                       3# R only, 2x12  Sidelying shoulder abd              3#; 2x20               Straight arm pull downs            GTB 2x15  Prone scap retractions              20 reps x 3 second holds (cueing)  Prone shoulder ext                    3# dumbell; 3x10  Prone rows                                8# dumbell; 3x10  Upper Trap stretch                    30" x 3  Levator scap stretch                 30" x 3  Standing ER/IR                          W/RTB 2x15 R  Standing IR shld @90 deg        W/RTB 2x12 R  ER walkouts                                                    W/RTB 2x10 R    CMS " Impairment/Limitation/Restriction for FOTO Neck Survey    Therapist reviewed FOTO scores for Gurpreet Youngblood on 4/29/2019.   FOTO documents entered into [employer] - see Media section.    Limitation Score: 27%             Home Exercises Provided and Patient Education Provided   Education provided:   - continue with HEP daily to strive for the best outcomes    Written Home Exercises Provided: Patient instructed to cont prior HEP.  Exercises were reviewed and Gurpreet was able to demonstrate them prior to the end of the session.  Gurpreet demonstrated good  understanding of the education provided.     See EMR under Patient Instructions for exercises provided 4/9/19.      Assessment     Patient required only min cueing for scapula retractions in prone with good carryover noted. R shoulder fatigued at end of the session. No improvement in FOTO functional score since IE    Gurpreet is progressing well towards his goals.   Pt prognosis is Excellent.     Pt will continue to benefit from skilled outpatient physical therapy to address the deficits listed in the problem list box on initial evaluation, provide pt/family education and to maximize pt's level of independence in the home and community environment.   Pt's spiritual, cultural and educational needs considered and pt agreeable to plan of care and goals.    Anticipated barriers to physical therapy: none    Goals:   Short Term Goals: 6 weeks:  1. Patient will demonstrate normal R cervical AROM with overpressure without pain. MET  2. Patient will demonstrate >170 degrees of R shoulder flexion for improved reaching overhead ability (Progressing, not met)  3. Patient will report <3/10 cervical pain at worst consistently during his work week. MET  4. Patient will demonstrate no pain with Spurling's test on the R. (Progressing, not met)  5. Patient will report <15% limitation on Neck FOTO survey. (Progressing, not met)      Plan     Cont POC to progress towards established goals.       Derrek Zavaleta Jr, PT

## 2019-05-01 ENCOUNTER — CLINICAL SUPPORT (OUTPATIENT)
Dept: REHABILITATION | Facility: HOSPITAL | Age: 29
End: 2019-05-01
Payer: COMMERCIAL

## 2019-05-01 DIAGNOSIS — M54.2 NECK PAIN: ICD-10-CM

## 2019-05-01 DIAGNOSIS — M25.611 DECREASED ROM OF RIGHT SHOULDER: ICD-10-CM

## 2019-05-01 PROCEDURE — 97110 THERAPEUTIC EXERCISES: CPT | Mod: PN

## 2019-05-02 NOTE — PROGRESS NOTES
"  Physical Therapy Daily Treatment Note     Name: Gurpreet Youngblood  Clinic Number: [id number]    Therapy Diagnosis:   Encounter Diagnoses   Name Primary?    Neck pain     Decreased ROM of right shoulder      Physician: Lucia Arreola PA-C    Visit Date: 5/1/2019    Physician Orders: PT Eval and Treat   Medical Diagnosis: M54.12 (ICD-10-CM) - Cervical radiculopathy  Evaluation Date: 4/9/2019   Authorization Period: 12/31/2019  Plan of Care Certification Period: 04/09/2019 to 05/07/2019  Visit # / Visits authorized: 7/50  FOTO: 7/10   PTA visit: --     Time In: 1105  Time Out: 1200   Total Billable Time: 20 minutes (Therex- 1)  Precautions: Standard    Subjective   Mr. Youngblood presents to PT today with no new complaints.   He was compliant with home exercise program.  Response to previous treatment: no adverse effectsl  Functional change: none reported  Pain: 0/10  Location: right cervical spine      Objective     Gurpreet received therapeutic exercises to develop strength, ROM and flexibility for 20 minutes with PT 1:1 including assessment and 30 minutes under supervision:    Serratus punches                      4# dowel 3x12  Sidelying ER                              4# R only, 2x15  Sidelying Flexion                       3# R only, 2x12  Sidelying shoulder abd              3#; 2x20               Straight arm pull downs            GTB 2x15  Prone scap retractions              20 reps x 3 second holds (cueing)  Prone shoulder ext                    3# dumbell; 3x10  Prone rows                                8# dumbell; 3x10  Upper Trap stretch                    30" x 3  Levator scap stretch                 30" x 3  Standing ER/IR                          W/RTB 2x15 R  Standing IR shld @90 deg        W/RTB 2x12 R  ER walkouts                                                    W/RTB 2x10 R        Home Exercises Provided and Patient Education Provided   Education provided:   - continue with HEP daily to strive " for the best outcomes    Written Home Exercises Provided: Patient instructed to cont prior HEP.  Exercises were reviewed and Gurpreet was able to demonstrate them prior to the end of the session.  Gurpreet demonstrated good  understanding of the education provided.     See EMR under Patient Instructions for exercises provided 4/9/19.      Assessment   A:  Tolerated session well without issued.  Required moderate cueing for scapular stabilization during shoulder strengthening therex.     Gurpreet is progressing well towards his goals.   Pt prognosis is Excellent.     Pt will continue to benefit from skilled outpatient physical therapy to address the deficits listed in the problem list box on initial evaluation, provide pt/family education and to maximize pt's level of independence in the home and community environment.   Pt's spiritual, cultural and educational needs considered and pt agreeable to plan of care and goals.    Anticipated barriers to physical therapy: none    Goals:   Short Term Goals: 6 weeks:  1. Patient will demonstrate normal R cervical AROM with overpressure without pain. MET  2. Patient will demonstrate >170 degrees of R shoulder flexion for improved reaching overhead ability (Progressing, not met)  3. Patient will report <3/10 cervical pain at worst consistently during his work week. MET  4. Patient will demonstrate no pain with Spurling's test on the R. (Progressing, not met)  5. Patient will report <15% limitation on Neck FOTO survey. (Progressing, not met)      Plan     Cont POC to progress towards established goals.  Nearing return to work.     Lg Anderson, PT

## 2019-05-03 ENCOUNTER — PATIENT MESSAGE (OUTPATIENT)
Dept: ORTHOPEDICS | Facility: CLINIC | Age: 29
End: 2019-05-03

## 2019-05-06 ENCOUNTER — PATIENT MESSAGE (OUTPATIENT)
Dept: ORTHOPEDICS | Facility: CLINIC | Age: 29
End: 2019-05-06

## 2019-05-10 ENCOUNTER — CLINICAL SUPPORT (OUTPATIENT)
Dept: REHABILITATION | Facility: HOSPITAL | Age: 29
End: 2019-05-10
Payer: COMMERCIAL

## 2019-05-10 DIAGNOSIS — M54.2 NECK PAIN: ICD-10-CM

## 2019-05-10 DIAGNOSIS — M25.611 DECREASED ROM OF RIGHT SHOULDER: ICD-10-CM

## 2019-05-10 PROCEDURE — 97110 THERAPEUTIC EXERCISES: CPT | Mod: PN

## 2019-05-10 NOTE — PROGRESS NOTES
"    Physical Therapy Daily Treatment Note     Name: Gurpreet Youngblood  Clinic Number: [id number]    Therapy Diagnosis:   No diagnosis found.  Physician: Lucia Arreola PA-C    Visit Date: 5/10/2019    Physician Orders: PT Eval and Treat   Medical Diagnosis: M54.12 (ICD-10-CM) - Cervical radiculopathy  Evaluation Date: 4/9/2019   Authorization Period: 12/31/2019  Plan of Care Certification Period: 04/09/2019 to 05/07/2019  Visit # / Visits authorized: 7/50  FOTO: 7/10   PTA visit: --     Time In: 1105  Time Out: 1200   Total Billable Time: 20 minutes (Therex- 1)  Precautions: Standard    Subjective   Mr. Youngblood presents to PT today with no new complaints.   He was compliant with home exercise program.  Response to previous treatment: no adverse effectsl  Functional change: none reported  Pain: 0/10  Location: right cervical spine      Objective     Gurpreet received therapeutic exercises to develop strength, ROM and flexibility for 20 minutes with PT 1:1 including assessment and 30 minutes under supervision:    Serratus punches                      4# dowel 3x12  Sidelying ER                              4# R only, 2x15  Sidelying Flexion                       3# R only, 2x12  Sidelying shoulder abd              3#; 2x20               Straight arm pull downs            GTB 2x15  Prone scap retractions              20 reps x 3 second holds (cueing)  Prone shoulder ext                    3# dumbell; 3x10  Prone rows                                8# dumbell; 3x10  Upper Trap stretch                    30" x 3  Levator scap stretch                 30" x 3  Standing ER/IR                          W/RTB 2x15 R  Standing IR shld @90 deg        W/RTB 2x12 R  ER walkouts                                                    W/RTB 2x10 R        Home Exercises Provided and Patient Education Provided   Education provided:   - continue with HEP daily to strive for the best outcomes    Written Home Exercises Provided: Patient " instructed to cont prior HEP.  Exercises were reviewed and Gurpreet was able to demonstrate them prior to the end of the session.  Gurpreet demonstrated good  understanding of the education provided.     See EMR under Patient Instructions for exercises provided 4/9/19.      Assessment   A:  Tolerated session well without issued.  Required moderate cueing for scapular stabilization during shoulder strengthening therex.     Gurpreet is progressing well towards his goals.   Pt prognosis is Excellent.     Pt will continue to benefit from skilled outpatient physical therapy to address the deficits listed in the problem list box on initial evaluation, provide pt/family education and to maximize pt's level of independence in the home and community environment.   Pt's spiritual, cultural and educational needs considered and pt agreeable to plan of care and goals.    Anticipated barriers to physical therapy: none    Goals:   Short Term Goals: 6 weeks:  1. Patient will demonstrate normal R cervical AROM with overpressure without pain. MET  2. Patient will demonstrate >170 degrees of R shoulder flexion for improved reaching overhead ability (Progressing, not met)  3. Patient will report <3/10 cervical pain at worst consistently during his work week. MET  4. Patient will demonstrate no pain with Spurling's test on the R. (Progressing, not met)  5. Patient will report <15% limitation on Neck FOTO survey. (Progressing, not met)      Plan     Cont POC to progress towards established goals.  Nearing return to work.     Carly Leon PTA

## 2019-05-10 NOTE — PLAN OF CARE
Outpatient Therapy Updated Plan of Care     Visit Date: 5/10/2019  Name: Gurpreet Youngblood  Clinic Number: [id number]    Therapy Diagnosis:   Encounter Diagnoses   Name Primary?    Neck pain     Decreased ROM of right shoulder      Physician: Lucia Arreola PA-C    Physician Orders: PT Eval and Treat   Medical Diagnosis: M54.12 (ICD-10-CM) - Cervical radiculopathy  Evaluation Date: 4/9/2019   Authorization Period: 12/31/2019  Current Plan of Care Certification Period: 04/09/2019 to 05/07/2019  Total Visits Received: 8/50  Cancelled Visits: 3  No Show Visits: 0    Precautions:  standard        Subjective     Update: the right shoulder and neck pain doesn't bother him anymore. He is currently not having any pain (0/10). 90% improved he does notice when he is doing lifting activities his right arm gets tired fast. He does still get a burning sensation in the right shoulder. He is returning to work this week and wants to make sure he continues to do well before ending therapy     Objective     Update:  Palpation:  Minimal tenderness R upper trap with continued increased tissue tension     Posture:  Cervical: mild forward head posturing. Thoracic: mild increased in thoracic kyphosis     Cervicothoracic Range of Motion:    Degrees Pain   Flexion WNL NP   Extension WNL NP   Right Rotation WNL NP   Left Rotation WNL NP   Right Side Bending WNL NP   Left Side Bending WNL NP        UE Range of Motion:   Shoulder Left Right   Shoulder Flexion WNL WNL   Shoulder ER WNL WNL   Shoulder IR WNL WNL   Elbow WNL WNL   Wrist WNL WNL     Cervical Strength:  Cervical MMT   Flexion 4+/5   Extension 4+/5   Right Side Bend 4+/5   Left Side Bend 4+/5       Upper Extremity Strength:  (L) UE  (R) UE    Shoulder flexion: 5/5 Shoulder flexion: 5/5   Shoulder Abduction: 5/5 Shoulder abduction: 5/5   Shoulder ER 5/5 Shoulder ER 5/5   Shoulder IR 5/5 Shoulder IR 5/5     Joint Mobility:  Stiffness into cervical retraction and subcranial  right sidebending  with passive assessment.    CMS Impairment/Limitation/Restriction for FOTO Neck  Survey    Therapist reviewed FOTO scores for Gurpreet Youngblood on 5/10/2019.   FOTO documents entered into [employer] - see Media section.    Limitation Score: 22%             Assessment     Update: Patient continues to progress well treatment program with improved cervical and shoulder strength noted when compared to the initial evaluation. Patient is compliant with HEP. Patient would benefit from continued PT as patient returns to work .     Previous Short Term Goals Status:     Short Term Goals: 6 weeks:  1. Patient will demonstrate normal R cervical AROM with overpressure without pain. MET  2. Patient will demonstrate >170 degrees of R shoulder flexion for improved reaching overhead ability (Progressing, not met)  3. Patient will report <3/10 cervical pain at worst consistently during his work week. MET  4. Patient will demonstrate no pain with Spurling's test on the R. (Progressing, not met)  5. Patient will report <15% limitation on Neck FOTO survey. (Progressing, not met)  New Short Term Goals Status:   As above  Long Term Goal Status:   continue per initial plan of care.  Reasons for Recertification of Therapy:   Required Updated POC    Plan     Updated Certification Period: 5/10/2019 to 6/7/2019  Recommended Treatment Plan: 2 times per week for 4 weeks: Manual Therapy, Patient Education, Therapeutic Activites and Therapeutic Exercise  Other Recommendations:modalities prn, ASTYM prn, kinesiotape prn, Functional Dry Needling prn       Derrek Zavaleta Jr, PT  5/10/2019      I CERTIFY THE NEED FOR THESE SERVICES FURNISHED UNDER THIS PLAN OF TREATMENT AND WHILE UNDER MY CARE    Physician's comments:        Physician's Signature: ___________________________________________________

## 2019-05-10 NOTE — PROGRESS NOTES
"  Physical Therapy Daily Treatment Note     [facility]  [facility] Number: [id number]    Therapy Diagnosis:   Encounter Diagnoses   Name Primary?    Neck pain     Decreased ROM of right shoulder      Physician: Lucia Arreola PA-C    Visit Date: 5/10/2019    Physician Orders: PT Eval and Treat   Medical Diagnosis: M54.12 (ICD-10-CM) - Cervical radiculopathy  Evaluation Date: 4/9/2019   Authorization Period: 12/31/2019  Plan of Care Certification Period: 04/09/2019 to 05/07/2019  Visit # / Visits authorized: 8/50  FOTO: 8/10   PTA visit: --     Time In: 1110  Time Out: 1210   Total Billable Time: 60 minutes (Therex- 4)  Precautions: Standard    Subjective     See Updated POC      Objective     Gurpreet received therapeutic exercises to develop strength, ROM and flexibility for 60  minutes including updating the POC:    Serratus punches                      4# dowel 3x12  Sidelying ER                              4# R only, 2x15  Sidelying Flexion                       3# R only, 2x12  Sidelying shoulder abd              3#; 2x20               Straight arm pull downs            GTB 2x15  Prone scap retractions              20 reps x 3 second holds (cueing)  Prone shoulder ext                    3# dumbell; 2x15  Prone rows                                8# dumbell; 2x15  Upper Trap stretch                    30" x 3  Levator scap stretch                 30" x 3  Standing ER                         W/GTB 2x10 R  Standing IR shld @90 deg        W/GTB 3x10 R  ER walkouts                                                    W/RTB 2x15 R        Home Exercises Provided and Patient Education Provided   Education provided:   - update on Plan of Care    Written Home Exercises Provided: Patient instructed to cont prior HEP.  Exercises were reviewed and Gurpreet was able to demonstrate them prior to the end of the session.  Gurpreet demonstrated good  understanding of the education provided.     See EMR under Patient " Instructions for exercises provided 4/9/19.      Assessment     See Updated POC     Gurpreet is progressing well towards his goals.   Pt prognosis is Excellent.     Pt will continue to benefit from skilled outpatient physical therapy to address the deficits listed in the problem list box on initial evaluation, provide pt/family education and to maximize pt's level of independence in the home and community environment.   Pt's spiritual, cultural and educational needs considered and pt agreeable to plan of care and goals.    Anticipated barriers to physical therapy: none    Goals:   See Updated POC      Plan     Continuing with PT per updated POC    Derrek Zavaleta Jr, PT

## 2019-05-13 ENCOUNTER — CLINICAL SUPPORT (OUTPATIENT)
Dept: REHABILITATION | Facility: HOSPITAL | Age: 29
End: 2019-05-13
Payer: COMMERCIAL

## 2019-05-13 DIAGNOSIS — M25.611 DECREASED ROM OF RIGHT SHOULDER: ICD-10-CM

## 2019-05-13 DIAGNOSIS — M54.2 NECK PAIN: ICD-10-CM

## 2019-05-13 PROCEDURE — 97110 THERAPEUTIC EXERCISES: CPT | Mod: PN

## 2019-05-13 PROCEDURE — 97112 NEUROMUSCULAR REEDUCATION: CPT | Mod: PN

## 2019-05-13 NOTE — PROGRESS NOTES
"  Physical Therapy Daily Treatment Note     Name: Gurpreet Youngblood  Clinic Number: [id number]    Therapy Diagnosis:   Encounter Diagnoses   Name Primary?    Neck pain     Decreased ROM of right shoulder      Physician: Lucia Arreola PA-C    Visit Date: 5/13/2019    Physician Orders: PT Eval and Treat   Medical Diagnosis: M54.12 (ICD-10-CM) - Cervical radiculopathy  Evaluation Date: 4/9/2019   Authorization Period: 12/31/2019  Plan of Care Certification Period: 5/10/2019 to 6/7/2019  Visit # / Visits authorized: 9/50  FOTO: 9/10      Time In: 1110  Time Out: 1205   Total Billable Time: 55 minutes (3 TE, 1 NM)  Precautions: Standard    Subjective   Mr. Youngblood presents to PT today with no new complaints. He started back to work yesterday but states that he did not perform any of his normal duties that were causing him pain prior to staring PT.      He was compliant with home exercise program.  Response to previous treatment: no adverse effectsl  Functional change: none reported  Pain: 0/10  Location: right cervical spine      Objective     Gurpreet received therapeutic exercises to develop strength, ROM and flexibility for 45  minutes including assessment:         Straight arm pull downs            BTB 3x15  Prone scap retractions             20 reps  Prone shoulder ext                    3# dumbell; 3x10  Prone rows                                8# dumbell; 3x10  Prone middle trap flys    3# dumbell; 3x10  Prone shoulder ER/IR    3# dumbell; 3x10  Seated shoulder ER     At 90/90 unsupported; 3x10 with 4# dumbell   Upper Trap stretch                    30" x 3  Levator scap stretch                 30" x 3    Gurpreet participated in the following neuromuscular re-education activities for joint sense and kinesthesia for preparing to return to work:  Total time 10 minutes  Cable machine    D1 shoulder flexion; 3x10 B at 10#  Cable machine    Chest press; 2x20 at 7#      Home Exercises Provided and Patient Education " Provided   Education provided:   - work hardening activities focus during these next few weeks of PT.     Written Home Exercises Provided: Patient instructed to cont prior HEP.  Exercises were reviewed and Gurpreet was able to demonstrate them prior to the end of the session.  Gurpreet demonstrated good  understanding of the education provided.     See EMR under Patient Instructions for exercises provided 4/9/19.      Assessment   A:  Tolerated work hardening activities well today without R cervicoscapular pain.     Gurpreet is progressing well towards his goals.   Pt prognosis is Excellent.     Pt will continue to benefit from skilled outpatient physical therapy to address the deficits listed in the problem list box on initial evaluation, provide pt/family education and to maximize pt's level of independence in the home and community environment.   Pt's spiritual, cultural and educational needs considered and pt agreeable to plan of care and goals.    Anticipated barriers to physical therapy: none    Goals:   Short-term Goals:  6 weeks  1. Patient will demonstrate normal R cervical AROM with overpressure without pain. MET  2. Patient will demonstrate >170 degrees of R shoulder flexion for improved reaching overhead ability.  MET  3. Patient will report <3/10 cervical pain at worst consistently during his work week. MET  4. Patient will demonstrate no pain with Spurling's test on the R. (Progressing, not met)  5. Patient will report <15% limitation on Neck FOTO survey. (Progressing, not met)    Plan   Continue focus on work hardening activities.     Lg Anderson, PT

## 2019-05-15 ENCOUNTER — CLINICAL SUPPORT (OUTPATIENT)
Dept: REHABILITATION | Facility: HOSPITAL | Age: 29
End: 2019-05-15
Payer: COMMERCIAL

## 2019-05-15 DIAGNOSIS — M54.2 NECK PAIN: ICD-10-CM

## 2019-05-15 DIAGNOSIS — M25.611 DECREASED ROM OF RIGHT SHOULDER: ICD-10-CM

## 2019-05-15 PROCEDURE — 97112 NEUROMUSCULAR REEDUCATION: CPT | Mod: PN

## 2019-05-15 PROCEDURE — 97110 THERAPEUTIC EXERCISES: CPT | Mod: PN

## 2019-05-15 NOTE — PROGRESS NOTES
"  Physical Therapy Daily Treatment Note     Name: Gurpreet Youngblood  Clinic Number: [id number]    Therapy Diagnosis:   Encounter Diagnoses   Name Primary?    Neck pain     Decreased ROM of right shoulder      Physician: Lucia Arreola PA-C    Visit Date: 5/15/2019    Physician Orders: PT Eval and Treat   Medical Diagnosis: M54.12 (ICD-10-CM) - Cervical radiculopathy  Evaluation Date: 4/9/2019   Authorization Period: 12/31/2019  Plan of Care Certification Period: 5/10/2019 to 6/7/2019  Visit # / Visits authorized: 10/50  FOTO: 10/10 DONE     Time In: 11:05 AM   Time Out: 12:00 PM    Total Billable Time: 55 minutes (3 TE, 1 NM)  Precautions: Standard    Subjective   Mr. Youngblood is agreeable to PT session. He reports no new cervical or R shoulder pain today.     He was compliant with home exercise program.  Response to previous treatment: no adverse effectsl  Functional change: none reported  Pain: 0/10  Location: right cervical spine      Objective     Gurpreet received therapeutic exercises to develop strength, ROM and flexibility for 45  minutes including assessment:           Straight arm pull downs            NOT PERFORMED TODAY  Prone scap retractions             20 reps  Prone shoulder ext                    3# dumbell; 3x10 w/ scapular retraction  Prone rows                                8# dumbell; 3x10  Prone middle trap flys    3# dumbell; 2x10 over red physioball w/ scapular retraction  Prone shoulder ER/IR    3# dumbell; 3x10  Seated shoulder ER     NOT PERFORMED TODAY  Upper Trap stretch                    30" x 3  Levator scap stretch                 30" x 3  Lower trap (beggers pose)   Quadriped 2x10 should ext w/ palm supination  Tband 90* shld abd/flex/overhead  3x10 w/ YTB          Gurpreet participated in the following neuromuscular re-education activities for joint sense and kinesthesia for preparing to return to work:  Total time 10 minutes  Cable machine    D1 shoulder flexion; 3x10 B at " 10#  Cable machine    NOT PERFORMED TODAY      Home Exercises Provided and Patient Education Provided   Education provided:   - work hardening activities focus during these next few weeks of PT.     Written Home Exercises Provided: Patient instructed to cont prior HEP.  Exercises were reviewed and Gurpreet was able to demonstrate them prior to the end of the session.  Gurpreet demonstrated good  understanding of the education provided.     See EMR under Patient Instructions for exercises provided 4/9/19.      Assessment   A:  Pt required verbal instructions on postural awareness and technique. Modified prone activities with use of physioball today, no adverse reactions noted. Pt cooperative throughout session, provided rest breaks as needed  Gurpreet is progressing well towards his goals.   Pt prognosis is Excellent.     Pt will continue to benefit from skilled outpatient physical therapy to address the deficits listed in the problem list box on initial evaluation, provide pt/family education and to maximize pt's level of independence in the home and community environment.   Pt's spiritual, cultural and educational needs considered and pt agreeable to plan of care and goals.    Anticipated barriers to physical therapy: none    Goals:   Short-term Goals:  6 weeks  1. Patient will demonstrate normal R cervical AROM with overpressure without pain. MET  2. Patient will demonstrate >170 degrees of R shoulder flexion for improved reaching overhead ability.  MET  3. Patient will report <3/10 cervical pain at worst consistently during his work week. MET  4. Patient will demonstrate no pain with Spurling's test on the R. (Progressing, not met)  5. Patient will report <15% limitation on Neck FOTO survey. (Progressing, not met)    Plan   Continue focus on work hardening activities.   Progress as appropriate.     Jose Allen, PTA

## 2019-05-20 ENCOUNTER — CLINICAL SUPPORT (OUTPATIENT)
Dept: REHABILITATION | Facility: HOSPITAL | Age: 29
End: 2019-05-20
Payer: COMMERCIAL

## 2019-05-20 DIAGNOSIS — M54.2 NECK PAIN: ICD-10-CM

## 2019-05-20 DIAGNOSIS — M25.611 DECREASED ROM OF RIGHT SHOULDER: ICD-10-CM

## 2019-05-20 PROCEDURE — 97110 THERAPEUTIC EXERCISES: CPT | Mod: PN

## 2019-05-20 NOTE — PROGRESS NOTES
"  Physical Therapy Daily Treatment Note     Name: Gurpreet Youngblood  Clinic Number: [id number]    Therapy Diagnosis:   Encounter Diagnoses   Name Primary?    Neck pain     Decreased ROM of right shoulder      Physician: Lucia Arreola PA-C    Visit Date: 5/20/2019    Physician Orders: PT Eval and Treat   Medical Diagnosis: M54.12 (ICD-10-CM) - Cervical radiculopathy  Evaluation Date: 4/9/2019   Authorization Period: 12/31/2019  Plan of Care Certification Period: 5/10/2019 to 6/7/2019  Visit # / Visits authorized: 11/50  FOTO: at dc     Time In: 1105  Time Out: 1150   Total Billable Time: 30 minutes (2 TE)  Precautions: Standard    Subjective   Mr. Youngblood reports no complaints of R-sided neck/shoulder pain.   He was compliant with home exercise program.  Response to previous treatment: no adverse effectsl  Functional change: none reported  Pain: 0/10  Location: right cervical spine      Objective   O:  Normal RTC strength testing 5/5 mid-range isometrics.         (-) Terrell's test, (-) Sam-Hesham test R shoulder.           Normal cervical ROM t/o with normal cervical quadrant testing B    Gurpreet received therapeutic exercises to develop strength, ROM and flexibility for 30 minutes with PT 1:1 including assessment and 15 minutes under supervision:  Prone scap retractions             20 reps  Prone shoulder ext                    3# dumbell; 3x10 w/ scapular retraction  Prone rows                                8# dumbell; 3x10  Prone middle trap flys    3# dumbell; 2x10 over red physioball w/ scapular retraction  Prone shoulder ER/IR    3# dumbell; 3x10  Upper Trap stretch                    30" x 3  Levator scap stretch                 30" x 3  Lower trap (beggers pose)   Quadriped 2x10 should ext w/ palm supination  Tband 90* shld abd/flex/overhead  3x10 w/ YTB  Cable machine    D1 shoulder flexion; 3x10 B at 10#  Cable machine    Single arm chest press; 3x10 B at 10#  Upright cable rows    Single arm " 3x10 7#      Home Exercises Provided and Patient Education Provided   Education provided:   - work hardening activities focus during these next few weeks of PT.     Written Home Exercises Provided: Patient instructed to cont prior HEP.  Exercises were reviewed and Gurpreet was able to demonstrate them prior to the end of the session.  Gurpreet demonstrated good  understanding of the education provided.     See EMR under Patient Instructions for exercises provided 4/9/19.      Assessment   A:  Normal R shoulder and cervical examination today with improved tolerance to strengthening activities today.  Focus on work hardening activities today.     Gurpreet is progressing well towards his goals.   Pt prognosis is Excellent.     Pt will continue to benefit from skilled outpatient physical therapy to address the deficits listed in the problem list box on initial evaluation, provide pt/family education and to maximize pt's level of independence in the home and community environment.   Pt's spiritual, cultural and educational needs considered and pt agreeable to plan of care and goals.    Anticipated barriers to physical therapy: none    Goals:   Short-term Goals:  6 weeks  1. Patient will demonstrate normal R cervical AROM with overpressure without pain. MET  2. Patient will demonstrate >170 degrees of R shoulder flexion for improved reaching overhead ability.  MET  3. Patient will report <3/10 cervical pain at worst consistently during his work week. MET  4. Patient will demonstrate no pain with Spurling's test on the R. (Progressing, not met)  5. Patient will report <15% limitation on Neck FOTO survey. (Progressing, not met)    Plan   Continue focus on work hardening activities.  Nearing discharge.     Lg Anderson, PT

## 2019-05-23 ENCOUNTER — CLINICAL SUPPORT (OUTPATIENT)
Dept: REHABILITATION | Facility: HOSPITAL | Age: 29
End: 2019-05-23
Payer: COMMERCIAL

## 2019-05-23 DIAGNOSIS — M54.2 NECK PAIN: ICD-10-CM

## 2019-05-23 DIAGNOSIS — M25.611 DECREASED ROM OF RIGHT SHOULDER: ICD-10-CM

## 2019-05-23 PROCEDURE — 97110 THERAPEUTIC EXERCISES: CPT | Mod: PN

## 2019-05-24 NOTE — PROGRESS NOTES
"  Physical Therapy Daily Treatment Note     Name: Gurpreet Youngblood  Clinic Number: [id number]    Therapy Diagnosis:   Encounter Diagnoses   Name Primary?    Neck pain     Decreased ROM of right shoulder      Physician: Lucia Arreola PA-C    Visit Date: 5/23/2019    Physician Orders: PT Eval and Treat   Medical Diagnosis: M54.12 (ICD-10-CM) - Cervical radiculopathy  Evaluation Date: 4/9/2019   Authorization Period: 12/31/2019  Plan of Care Certification Period: 5/10/2019 to 6/7/2019  Visit # / Visits authorized: 12/50  FOTO: done     Time In: 1110  Time Out: 1200   Total Billable Time: 45 minutes (3 TE)  Precautions: Standard    Subjective   Mr. Youngblood reports no complaints of R-sided neck/shoulder pain. He has been performing all of his work duties without R-sided neck/shoulder pain.      He was compliant with home exercise program.  Response to previous treatment: mild R shoulder muscle soreness  Functional change: none reported  Pain: 0/10  Location: right cervical spine      Objective   O:  Normal RTC strength testing 5/5 mid-range isometrics.         (-) Terrell's test, (-) Sam-Hesham test R shoulder.           Normal cervical ROM t/o with normal cervical quadrant testing B         R middle trapezius MMT 5/5, lower trapezius MMT 5/5    Gurpreet received therapeutic exercises to develop strength, ROM and flexibility for 30 minutes with PT 1:1 including assessment and 15 minutes under supervision:  Prone scap retractions             20 reps  Prone shoulder ext                    3# dumbell; 3x10 w/ scapular retraction  Prone rows                                8# dumbell; 3x10  Prone middle trap flys    3# dumbell; 2x10 over red physioball w/ scapular retraction  Prone shoulder ER/IR    3# dumbell; 3x10  Upper Trap stretch                    30" x 3  Levator scap stretch                 30" x 3  Lower trap (beggers pose)   Quadriped 2x10 should ext w/ palm supination  Tband 90* shld " abd/flex/overhead  3x10 w/ YTB  Cable machine    D1 shoulder flexion; 3x10 B at 10#  Cable machine    Single arm chest press; 3x10 B at 10#  Upright cable rows    Single arm 3x10 7#      Home Exercises Provided and Patient Education Provided   Education provided:   - work hardening activities focus during these next few weeks of PT.     Written Home Exercises Provided: Patient instructed to cont prior HEP.  Exercises were reviewed and Gurpreet was able to demonstrate them prior to the end of the session.  Gurpreet demonstrated good  understanding of the education provided.     See EMR under Patient Instructions for exercises provided 4/9/19.      Assessment   A: Gurpreet has undergone 12 visits to address his R-sided neck/shoulder pain. 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  He is now able perform all of his work activities without R-sided neck or shoulder pain.  Skilled PT services no longer required at this time.      Gurpreet is progressing well towards his goals.   Pt prognosis is Excellent.     Pt will continue to benefit from skilled outpatient physical therapy to address the deficits listed in the problem list box on initial evaluation, provide pt/family education and to maximize pt's level of independence in the home and community environment.   Pt's spiritual, cultural and educational needs considered and pt agreeable to plan of care and goals.    Anticipated barriers to physical therapy: none    Goals:   Short-term Goals:  6 weeks  1. Patient will demonstrate normal R cervical AROM with overpressure without pain. MET  2. Patient will demonstrate >170 degrees of R shoulder flexion for improved reaching overhead ability.  MET  3. Patient will report <3/10 cervical pain at worst consistently during his work week. MET  4. Patient will demonstrate no pain with Spurling's test on the R. MET  5.  Patient will report <15% limitation on Neck FOTO survey. MET    Plan   Discharge today     Lg Anderson, PT       [facility]patient Therapy Discharge Summary     [id number]    Therapy Diagnosis:   Encounter Diagnoses   Name Primary?    Neck pain     Decreased ROM of right shoulder      Physician: Lucia Arreola PA-C    Physician Orders: PT Eval and Treat   Medical Diagnosis: M54.12 (ICD-10-CM) - Cervical radiculopathy  Evaluation Date: 4/9/2019     Date of Last visit: 5/23/2019  Total Visits Received: 12  Cancelled Visits: 1  No Show Visits: 1    Assessment    Goals: see above    Discharge reason: Patient has met all of his/her goals and Patient has reached the maximum rehab potential for the present time    Plan   This patient is discharged from Physical Therapy.

## 2019-05-27 PROBLEM — M54.2 NECK PAIN: Status: RESOLVED | Noted: 2019-04-10 | Resolved: 2019-05-27

## 2019-05-27 PROBLEM — M25.611 DECREASED ROM OF RIGHT SHOULDER: Status: RESOLVED | Noted: 2019-04-10 | Resolved: 2019-05-27

## 2019-07-22 ENCOUNTER — OFFICE VISIT (OUTPATIENT)
Dept: OTOLARYNGOLOGY | Facility: CLINIC | Age: 29
End: 2019-07-22
Payer: COMMERCIAL

## 2019-07-22 DIAGNOSIS — J00 ACUTE RHINITIS: Primary | ICD-10-CM

## 2019-07-22 PROCEDURE — 99202 PR OFFICE/OUTPT VISIT, NEW, LEVL II, 15-29 MIN: ICD-10-PCS | Mod: S$GLB,,, | Performed by: NURSE PRACTITIONER

## 2019-07-22 PROCEDURE — 99999 PR PBB SHADOW E&M-EST. PATIENT-LVL II: CPT | Mod: PBBFAC,,, | Performed by: NURSE PRACTITIONER

## 2019-07-22 PROCEDURE — 99202 OFFICE O/P NEW SF 15 MIN: CPT | Mod: S$GLB,,, | Performed by: NURSE PRACTITIONER

## 2019-07-22 PROCEDURE — 99999 PR PBB SHADOW E&M-EST. PATIENT-LVL II: ICD-10-PCS | Mod: PBBFAC,,, | Performed by: NURSE PRACTITIONER

## 2019-07-22 RX ORDER — FLUTICASONE PROPIONATE 50 MCG
2 SPRAY, SUSPENSION (ML) NASAL DAILY
Qty: 1 BOTTLE | Refills: 2 | Status: SHIPPED | OUTPATIENT
Start: 2019-07-22 | End: 2019-07-24 | Stop reason: SDUPTHER

## 2019-07-22 NOTE — PROGRESS NOTES
Subjective:      Gurpreet Youngblood is a 29 y.o. male who was self-referred for nasal congestion.    Mr. Youngblood reports nasal congestion for the past month. He has associated itchy nose, sneezing, post-nasal drip, infrequent cough and morning headache. He denies fever, sinus pressure or pain, hyposmia, halitosis or nasal discharge. He denies using any nasal sprays or recent antibiotics or steroids.     Current sinonasal medications as above.  He does not regularly use nasal decongestant sprays.    He does not recall previously having allergy testing.    He denies a history of asthma.    He denies a history of reflux symptoms.  He has not previously had an EGD.    He denies have a diagnosis of obstructive sleep apnea.     He has not had sinonasal surgery.    He does not recall a prior history of nasal trauma.      Past Medical History  He has a past medical history of Anxiety, Concussion, Fatigue, Headache(784.0), and PTSD (post-traumatic stress disorder).    Past Surgical History  He has no past surgical history on file.    Family History  His family history includes No Known Problems in his brother, mother, and sister.    Social History  He reports that he has never smoked. He has never used smokeless tobacco. He reports that he does not drink alcohol or use drugs.    Allergies  He has No Known Allergies.    Medications   He has a current medication list which includes the following prescription(s): biktarvy, cephalexin, ibuprofen, prednisone, promethazine, and fluticasone propionate.    Review of Systems  Review of Systems   Constitutional: Negative for chills, fatigue and fever.   HENT: Positive for congestion, postnasal drip, rhinorrhea and sneezing. Negative for facial swelling, nosebleeds, sinus pressure, sinus pain, sore throat and tinnitus.    Eyes: Positive for itching. Negative for photophobia, redness and visual disturbance.   Respiratory: Positive for cough. Negative for apnea, shortness of  breath, wheezing and stridor.    Cardiovascular: Negative for chest pain and palpitations.   Gastrointestinal: Negative for diarrhea, nausea and vomiting.   Endocrine: Negative.    Genitourinary: Negative for decreased urine volume, dysuria and frequency.   Musculoskeletal: Positive for arthralgias. Negative for myalgias and neck stiffness.   Skin: Negative for rash and wound.   Allergic/Immunologic: Positive for environmental allergies. Negative for food allergies and immunocompromised state.   Neurological: Positive for headaches. Negative for dizziness, syncope, weakness and light-headedness.   Hematological: Negative for adenopathy. Does not bruise/bleed easily.   Psychiatric/Behavioral: Negative for confusion, decreased concentration and sleep disturbance.          Objective:     There were no vitals taken for this visit.       Constitutional:   He is oriented to person, place, and time. Vital signs are normal. He appears well-developed and well-nourished. He appears alert. Normal speech.      Head:  Normocephalic and atraumatic.     Ears:    Right Ear: No lacerations. No drainage, swelling or tenderness. No foreign bodies. No mastoid tenderness. Tympanic membrane is not injected, not scarred, not perforated, not erythematous, not retracted and not bulging. Tympanic membrane mobility is normal. No middle ear effusion. No hemotympanum.   Left Ear: No lacerations. No drainage, swelling or tenderness. No foreign bodies. No mastoid tenderness. Tympanic membrane is not injected, not scarred, not perforated, not erythematous, not retracted and not bulging. Tympanic membrane mobility is normal.  No middle ear effusion. No hemotympanum.     Nose:  Nose normal including turbinates, nasal mucosa, sinuses and nasal septum. Mucosal edema and rhinorrhea present. No nose lacerations, sinus tenderness, septal deviation, nasal septal hematoma or polyps. No epistaxis.  No foreign bodies. Right sinus exhibits no maxillary sinus  tenderness and no frontal sinus tenderness. Left sinus exhibits no maxillary sinus tenderness and no frontal sinus tenderness.     Mouth/Throat  Oropharynx clear and moist without lesions or asymmetry, normal uvula midline and lips, teeth, and gums normal. No uvula swelling, oral lesions, trismus, mucous membrane lesions or xerostomia. No oropharyngeal exudate or posterior oropharyngeal erythema.     Neck:  Neck normal without thyromegaly masses, asymmetry, normal tracheal structure, crepitus, and tenderness and no adenopathy.     Cardiovascular:   Normal pulses.      Pulmonary/Chest:   Effort normal and breath sounds normal.     Psychiatric:   He has a normal mood and affect. His speech is normal and behavior is normal.     Neurological:   He is alert and oriented to person, place, and time. No cranial nerve deficit.     Skin:   No abrasions, lacerations, lesions, or rashes.       Procedure    None        Data Reviewed    WBC (K/uL)   Date Value   04/11/2016 4.54     Eosinophil% (%)   Date Value   04/11/2016 1.5     Eos # (K/uL)   Date Value   04/11/2016 0.1     Platelets (K/uL)   Date Value   04/11/2016 328     No results found for: GLU  No results found for: IGE    No sinus imaging available.       Assessment:     1. Acute rhinitis         Plan:     I had a long discussion with the patient regarding his condition and the further workup and management options.    Gurpreet was seen today for sinus problem.    Diagnoses and all orders for this visit:    Acute rhinitis  -     fluticasone propionate (FLONASE) 50 mcg/actuation nasal spray; 2 sprays (100 mcg total) by Each Nare route once daily.    He will call me in one month on how he is doing.  If no improvement, will consider adding nasal antihistamine.   Follow up in about 4 weeks (around 8/19/2019), or if symptoms worsen or fail to improve.

## 2019-07-24 ENCOUNTER — PATIENT MESSAGE (OUTPATIENT)
Dept: OTOLARYNGOLOGY | Facility: CLINIC | Age: 29
End: 2019-07-24

## 2019-07-24 DIAGNOSIS — J00 ACUTE RHINITIS: ICD-10-CM

## 2019-07-24 RX ORDER — FLUTICASONE PROPIONATE 50 MCG
2 SPRAY, SUSPENSION (ML) NASAL DAILY
Qty: 1 BOTTLE | Refills: 2 | Status: SHIPPED | OUTPATIENT
Start: 2019-07-24 | End: 2019-08-23

## 2019-07-28 ENCOUNTER — HOSPITAL ENCOUNTER (EMERGENCY)
Facility: HOSPITAL | Age: 29
Discharge: HOME | End: 2019-07-29
Attending: EMERGENCY MEDICINE
Payer: COMMERCIAL

## 2019-07-28 DIAGNOSIS — B20 HIV (HUMAN IMMUNODEFICIENCY VIRUS INFECTION): Primary | ICD-10-CM

## 2019-07-28 DIAGNOSIS — R19.7 DIARRHEA, UNSPECIFIED TYPE: ICD-10-CM

## 2019-07-28 PROCEDURE — 85025 COMPLETE CBC W/AUTO DIFF WBC: CPT

## 2019-07-28 PROCEDURE — 99283 EMERGENCY DEPT VISIT LOW MDM: CPT

## 2019-07-28 PROCEDURE — 80053 COMPREHEN METABOLIC PANEL: CPT

## 2019-07-28 PROCEDURE — 83690 ASSAY OF LIPASE: CPT

## 2019-07-29 VITALS
BODY MASS INDEX: 25.39 KG/M2 | HEIGHT: 66 IN | DIASTOLIC BLOOD PRESSURE: 68 MMHG | TEMPERATURE: 98 F | WEIGHT: 158 LBS | RESPIRATION RATE: 20 BRPM | OXYGEN SATURATION: 100 % | HEART RATE: 86 BPM | SYSTOLIC BLOOD PRESSURE: 125 MMHG

## 2019-07-29 LAB
ALBUMIN SERPL BCP-MCNC: 4.5 G/DL (ref 3.5–5.2)
ALP SERPL-CCNC: 74 U/L (ref 55–135)
ALT SERPL W/O P-5'-P-CCNC: 11 U/L (ref 10–44)
ANION GAP SERPL CALC-SCNC: 11 MMOL/L (ref 8–16)
AST SERPL-CCNC: 20 U/L (ref 10–40)
BASOPHILS # BLD AUTO: 0.01 K/UL (ref 0–0.2)
BASOPHILS NFR BLD: 0.2 % (ref 0–1.9)
BILIRUB SERPL-MCNC: 0.8 MG/DL (ref 0.1–1)
BUN SERPL-MCNC: 8 MG/DL (ref 6–20)
CALCIUM SERPL-MCNC: 9.6 MG/DL (ref 8.7–10.5)
CHLORIDE SERPL-SCNC: 103 MMOL/L (ref 95–110)
CO2 SERPL-SCNC: 27 MMOL/L (ref 23–29)
CREAT SERPL-MCNC: 1 MG/DL (ref 0.5–1.4)
DIFFERENTIAL METHOD: ABNORMAL
EOSINOPHIL # BLD AUTO: 0.1 K/UL (ref 0–0.5)
EOSINOPHIL NFR BLD: 1.3 % (ref 0–8)
ERYTHROCYTE [DISTWIDTH] IN BLOOD BY AUTOMATED COUNT: 12.4 % (ref 11.5–14.5)
EST. GFR  (AFRICAN AMERICAN): >60 ML/MIN/1.73 M^2
EST. GFR  (NON AFRICAN AMERICAN): >60 ML/MIN/1.73 M^2
GLUCOSE SERPL-MCNC: 81 MG/DL (ref 70–110)
HCT VFR BLD AUTO: 38.2 % (ref 40–54)
HGB BLD-MCNC: 13.1 G/DL (ref 14–18)
LIPASE SERPL-CCNC: 29 U/L (ref 4–60)
LYMPHOCYTES # BLD AUTO: 2.7 K/UL (ref 1–4.8)
LYMPHOCYTES NFR BLD: 58.4 % (ref 18–48)
MCH RBC QN AUTO: 30 PG (ref 27–31)
MCHC RBC AUTO-ENTMCNC: 34.3 G/DL (ref 32–36)
MCV RBC AUTO: 87 FL (ref 82–98)
MONOCYTES # BLD AUTO: 0.3 K/UL (ref 0.3–1)
MONOCYTES NFR BLD: 7.4 % (ref 4–15)
NEUTROPHILS # BLD AUTO: 1.5 K/UL (ref 1.8–7.7)
NEUTROPHILS NFR BLD: 32.7 % (ref 38–73)
PLATELET # BLD AUTO: 307 K/UL (ref 150–350)
PMV BLD AUTO: 10 FL (ref 9.2–12.9)
POTASSIUM SERPL-SCNC: 3.4 MMOL/L (ref 3.5–5.1)
PROT SERPL-MCNC: 7.9 G/DL (ref 6–8.4)
RBC # BLD AUTO: 4.37 M/UL (ref 4.6–6.2)
SODIUM SERPL-SCNC: 141 MMOL/L (ref 136–145)
WBC # BLD AUTO: 4.61 K/UL (ref 3.9–12.7)

## 2019-07-29 PROCEDURE — 87045 FECES CULTURE AEROBIC BACT: CPT

## 2019-07-29 PROCEDURE — 87427 SHIGA-LIKE TOXIN AG IA: CPT

## 2019-07-29 PROCEDURE — 87046 STOOL CULTR AEROBIC BACT EA: CPT | Mod: 59

## 2019-07-29 NOTE — ED PROVIDER NOTES
Encounter Date: 7/28/2019    SCRIBE #1 NOTE: I, Moriah Moore, am scribing for, and in the presence of,  Dr. Lee. I have scribed the entire note.       History     Chief Complaint   Patient presents with    Diarrhea     Reports diarrhea stools X 5 days.     A 29 year-old male presents to the ED for diarrhea for the last 5 days after eating gumbo. He reports average of 3-4 episodes of diarrhea daily. He denies history of the same. He denies fever, chills, N/V, dysuria, hematuria, chest pain, SOB, numbness/weakness or any other concerning symptoms. PMHx remarkable for HIV; last CD4 count reassuring and viral load is undetectable.       The history is provided by the patient.     Review of patient's allergies indicates:  No Known Allergies  Past Medical History:   Diagnosis Date    Anxiety     Concussion     Fatigue     Headache(784.0)     HIV (human immunodeficiency virus infection)     Immune deficiency disorder     PTSD (post-traumatic stress disorder)      Past Surgical History:   Procedure Laterality Date    EXAM UNDER ANESTHESIA N/A 4/29/2016    Performed by [facility] at [facility] 2ND FLR    FISTULOTOMY-RECTAL N/A 4/29/2016    Performed by [facility]QU[facility] Cl[facility]ton [facility] at [facility] OR 2ND FLR     Family History   Problem Relation Age of Onset    Stroke Mother     Hypertension Mother     No Known Problems Sister     No Known Problems Brother     Stroke Maternal Aunt     Cancer Maternal Grandmother     Cancer Maternal Grandfather     Melanoma Neg Hx     Psoriasis Neg Hx     Lupus Neg Hx      Social History     Tobacco Use    Smoking status: Never Smoker    Smokeless tobacco: Never Used   Substance Use Topics    Alcohol use: No     Alcohol/week: 0.0 oz    Drug use: No     Review of Systems   Constitutional: Negative for chills and fever.   HENT: Negative for congestion, ear pain, rhinorrhea and sore throat.    Respiratory: Negative for cough and shortness of breath.    Cardiovascular: Negative  for chest pain.   Gastrointestinal: Positive for diarrhea. Negative for abdominal pain, nausea and vomiting.   Genitourinary: Negative for dysuria and hematuria.   Musculoskeletal: Negative for myalgias and neck pain.   Skin: Negative for rash.   Neurological: Negative for dizziness, weakness, light-headedness and headaches.   Psychiatric/Behavioral: Negative for confusion.       Physical Exam     Initial Vitals [07/28/19 2238]   BP Pulse Resp Temp SpO2   107/67 69 16 98.3 °F (36.8 °C) 99 %      MAP       --         Physical Exam    Nursing note and vitals reviewed.  Constitutional: He appears well-developed and well-nourished. No distress.   HENT:   Head: Normocephalic and atraumatic.   Mouth/Throat: Oropharynx is clear and moist.   Eyes: Conjunctivae and EOM are normal.   Cardiovascular: Normal rate, regular rhythm, normal heart sounds and intact distal pulses.   Pulmonary/Chest: Breath sounds normal. No respiratory distress.   Abdominal: Soft. He exhibits no distension. There is no tenderness.   Musculoskeletal: Normal range of motion. He exhibits no edema or tenderness.   Neurological: He is alert and oriented to person, place, and time. He has normal strength.   Skin: Skin is warm and dry.         ED Course   Procedures  Labs Reviewed   CBC W/ AUTO DIFFERENTIAL - Abnormal; Notable for the following components:       Result Value    RBC 4.37 (*)     Hemoglobin 13.1 (*)     Hematocrit 38.2 (*)     Gran # (ANC) 1.5 (*)     Gran% 32.7 (*)     Lymph% 58.4 (*)     All other components within normal limits   COMPREHENSIVE METABOLIC PANEL - Abnormal; Notable for the following components:    Potassium 3.4 (*)     All other components within normal limits   CULTURE, STOOL   ENTEROHEMORRHAGIC E.COLI   LIPASE          MDM: pt with mild diarrhea for 5 days, no electrolyte abnomalities, normal cbc, undectable viral load with HIV. Benign abdominal pain, ok home, stool culture and Cdiff Pending, will be notified is sig.  Findings..                          Clinical Impression:       ICD-10-CM ICD-9-CM   1. HIV (human immunodeficiency virus infection) B20 V08   2. Diarrhea, unspecified type R19.7 787.91             I, Dr. Mason Lee, personally performed the services described in this documentation.   All medical record entries made by the scribe were at my direction and in my presence.   I have reviewed the chart and agree that the record is accurate and complete.   Mason Lee MD.  1:17 AM 07/29/2019                  Mason Lee MD  07/29/19 0118

## 2019-07-30 LAB
E COLI SXT1 STL QL IA: NEGATIVE
E COLI SXT2 STL QL IA: NEGATIVE

## 2019-08-01 LAB — BACTERIA STL CULT: NORMAL

## 2019-11-04 ENCOUNTER — OFFICE VISIT (OUTPATIENT)
Dept: URGENT CARE | Facility: CLINIC | Age: 29
End: 2019-11-04
Payer: OTHER GOVERNMENT

## 2019-11-04 VITALS
OXYGEN SATURATION: 100 % | HEART RATE: 65 BPM | RESPIRATION RATE: 18 BRPM | HEIGHT: 66 IN | SYSTOLIC BLOOD PRESSURE: 120 MMHG | TEMPERATURE: 98 F | BODY MASS INDEX: 24.11 KG/M2 | WEIGHT: 150 LBS | DIASTOLIC BLOOD PRESSURE: 82 MMHG

## 2019-11-04 DIAGNOSIS — M54.50 ACUTE LEFT-SIDED LOW BACK PAIN WITHOUT SCIATICA: ICD-10-CM
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
## 2019-11-04 DIAGNOSIS — M25.531 RIGHT WRIST PAIN: Primary | ICD-10-CM

## 2019-11-04 PROCEDURE — 73110 XR WRIST COMPLETE 3 VIEWS RIGHT: ICD-10-PCS | Mod: FY,RT,S$GLB, | Performed by: RADIOLOGY

## 2019-11-04 PROCEDURE — 99214 PR OFFICE/OUTPT VISIT, EST, LEVL IV, 30-39 MIN: ICD-10-PCS | Mod: S$GLB,,, | Performed by: NURSE PRACTITIONER

## 2019-11-04 PROCEDURE — 73110 X-RAY EXAM OF WRIST: CPT | Mod: FY,RT,S$GLB, | Performed by: RADIOLOGY

## 2019-11-04 PROCEDURE — 99214 OFFICE O/P EST MOD 30 MIN: CPT | Mod: S$GLB,,, | Performed by: NURSE PRACTITIONER

## 2019-11-04 RX ORDER — METHOCARBAMOL 500 MG/1
500 TABLET, FILM COATED ORAL 4 TIMES DAILY
Qty: 28 TABLET | Refills: 0 | Status: SHIPPED | OUTPATIENT
Start: 2019-11-04 | End: 2019-11-11

## 2019-11-04 RX ORDER — IBUPROFEN 600 MG/1
600 TABLET ORAL EVERY 6 HOURS PRN
Qty: 20 TABLET | Refills: 0 | Status: SHIPPED | OUTPATIENT
Start: 2019-11-04

## 2019-11-05 NOTE — PROGRESS NOTES
"Subjective:       Patient ID: Gurpreet Youngblood is a 29 y.o. male.    Vitals:  height is 5' 6" (1.676 m) and weight is 68 kg (150 lb). His temperature is 98 °F (36.7 °C). His blood pressure is 120/82 and his pulse is 65. His respiration is 18 and oxygen saturation is 100%.     Chief Complaint: Fall    Fall   The accident occurred 3 to 6 hours ago. Fall occurred: From a chair  He landed on hard floor. There was no blood loss. Point of impact: Back  The pain is present in the back and right wrist. The pain is at a severity of 6/10. The pain is moderate. The symptoms are aggravated by use of injured limb. Pertinent negatives include no abdominal pain, hematuria or loss of consciousness. He has tried nothing for the symptoms. The treatment provided no relief.       Constitution: Negative for fatigue.   HENT: Negative for facial swelling and facial trauma.    Neck: Negative for neck stiffness.   Cardiovascular: Negative for chest trauma.   Eyes: Negative for eye trauma, double vision and blurred vision.   Gastrointestinal: Negative for abdominal trauma, abdominal pain and rectal bleeding.   Genitourinary: Negative for hematuria, genital trauma and pelvic pain.   Musculoskeletal: Positive for joint pain and back pain. Negative for pain, trauma, joint swelling, abnormal ROM of joint and pain with walking.   Skin: Negative for color change, wound, abrasion and laceration.   Neurological: Negative for dizziness, history of vertigo, light-headedness, coordination disturbances, altered mental status and loss of consciousness.   Hematologic/Lymphatic: Negative for history of bleeding disorder.   Psychiatric/Behavioral: Negative for altered mental status.       Objective:      Physical Exam   Constitutional: He is oriented to person, place, and time. Vital signs are normal. He appears well-developed and well-nourished. He is active and cooperative.  Non-toxic appearance. He does not have a sickly appearance. He does not " appear ill. No distress.   HENT:   Head: Normocephalic and atraumatic.   Mouth/Throat: Oropharynx is clear and moist and mucous membranes are normal.   Eyes: Conjunctivae and lids are normal.   Neck: Trachea normal, normal range of motion, full passive range of motion without pain and phonation normal. Neck supple.   Cardiovascular: Normal rate, regular rhythm, normal heart sounds and intact distal pulses.   Pulses:       Radial pulses are 2+ on the right side, and 2+ on the left side.   Pulmonary/Chest: Effort normal and breath sounds normal.   Musculoskeletal: He exhibits no edema or deformity.        Right wrist: He exhibits tenderness (Mild tenderness noted to the radial aspect of the hand just distal to the radius). He exhibits normal range of motion, no bony tenderness, no swelling, no effusion, no crepitus, no deformity and no laceration.        Lumbar back: He exhibits tenderness, pain and spasm. He exhibits normal range of motion, no bony tenderness, no swelling, no edema, no deformity, no laceration and normal pulse.        Back:    Neurological: He is alert and oriented to person, place, and time. He has normal strength and normal reflexes. No sensory deficit.   Skin: Skin is warm, dry, intact and not diaphoretic. Capillary refill takes less than 2 seconds.   Psychiatric: He has a normal mood and affect. His speech is normal and behavior is normal. Judgment and thought content normal. Cognition and memory are normal.   Nursing note and vitals reviewed.        Assessment:       1. Right wrist pain    2. Fall, initial encounter    3. Acute left-sided low back pain without sciatica        Three views of the right demonstrate no acute fracture or dislocation.      Impression       No acute bony abnormality detected.         Plan:         Back pain most likely Musculoskeletal Pain.     Right wrist pain  -     THUMB ORTHOSIS SPLINT UNIVERSAL FOR HOME USE    Fall, initial encounter  -     X-Ray Wrist Complete  Right; Future; Expected date: 11/04/2019    Acute left-sided low back pain without sciatica  -     methocarbamol (ROBAXIN) 500 MG Tab; Take 1 tablet (500 mg total) by mouth 4 (four) times daily. for 7 days  Dispense: 28 tablet; Refill: 0  -     ibuprofen (ADVIL,MOTRIN) 600 MG tablet; Take 1 tablet (600 mg total) by mouth every 6 (six) hours as needed for Pain.  Dispense: 20 tablet; Refill: 0      Patient Instructions     Today's X ray read  Three views of the right demonstrate no acute fracture or dislocation.      Impression       No acute bony abnormality detected.           If you were prescribed a narcotic or controlled medication, do not drive or operate heavy equipment or machinery while taking these medications.  You must understand that you've received an Urgent Care treatment only and that you may be released before all your medical problems are known or treated. You, the patient, will arrange for follow up care as instructed.  Follow up with your PCP or specialty clinic as directed within 2-5 days if not improved or as needed.  You can call [phone] to schedule an appointment with the appropriate provider.  If your condition worsens we recommend that you receive another evaluation at the emergency room immediately or contact your primary medical clinics after hours call service to discuss your concerns.  Please return here or go to the Emergency Department for any concerns or worsening of condition.      Self-Care for Low Back Pain    Most people have low back pain now and then. In many cases, it isnt serious and self-care can help. Sometimes low back pain can be a sign of a bigger problem. Call your healthcare provider if your pain returns often or gets worse over time. For the long-term care of your back, get regular exercise, lose any excess weight and learn good posture.  Take a short rest  Lying down during the day may be beneficial for short periods of time if severe pain increases with  sitting or standing. Long-term bed rest could be detrimental.  Reduce pain and swelling  Cold reduces swelling. Both cold and heat can reduce pain. Protect your skin by placing a towel between your body and the ice or heat source.  · For the first few days, apply an ice pack for 15 to 20 minutes .  · After the first few days, try heat for 15 minutes at a time to ease pain. Never sleep on a heating pad.  · Over-the-counter medicine can help control pain and swelling. Try aspirin or ibuprofen.  Exercise  Exercise can help your back heal. It also helps your back get stronger and more flexible, preventing any reinjury. Ask your healthcare provider about specific exercises for your back.  Use good posture to avoid reinjury  · When moving, bend at the hips and knees. Dont bend at the waist or twist around.  · When lifting, keep the object close to your body. Dont try to lift more than you can handle.  · When sitting, keep your lower back supported. Use a rolled-up towel as needed.  Seek immediate medical care if:  · Youre unable to stand or walk.  · You have a temperature over 100.4°F (38.0°C)  · You have frequent, painful, or bloody urination.  · You have severe abdominal pain.  · You have a sharp, stabbing pain.  · Your pain is constant.  · You have pain or numbness in your leg.  · You feel pain in a new area of your back.  · You notice that the pain isnt decreasing after more than a week.   Date Last Reviewed: 9/29/2015  © 5122-2985 [employer]. [address] [place] [address], [place] [address]. All rights reserved. This information is not intended as a substitute for professional medical care. Always follow your healthcare professional's instructions.        Wrist Sprain  A sprain is an injury to the ligaments or capsule that holds a joint together. There are no broken bones. Most sprains take about 3 to 6 weeks to heal. If it a severe sprain where the ligament is completely torn, it can take months to  recover.     Most wrist sprains are treated with a splint, wrist brace, or elastic wrap for support. Severe sprains may require surgery.  Home care  · Keep your arm elevated to reduce pain and swelling. This is very important during the first 48 hours.  · Apply an ice pack over the injured area for 15 to 20 minutes every 3 to 6 hours. You should do this for the first 24 to 48 hours. You can make an ice pack by filling a plastic bag that seals at the top with ice cubes and then wrapping it with a thin towel. Continue to use ice packs for relief of pain and swelling as needed. As the ice melts, be careful to avoid getting your wrap, splint, or cast wet. After 48 hours, apply heat (warm shower or warm bath) for 15 to 20 minutes several times a day, or alternate ice and heat.   · You may use over-the-counter pain medicine to control pain, unless another pain medicine was prescribed. If you have chronic liver or kidney disease or ever had a stomach ulcer or GI bleeding, talk with your doctor before using these medicines.  · If you were given a splint or brace, wear it for the time advised by your doctor.  Follow-up care  Follow up with your healthcare provider as advised. Any X-rays you had today dont show any broken bones, breaks, or fractures. Sometimes fractures dont show up on the first X-ray. Bruises and sprains can sometimes hurt as much as a fracture. These injuries can take time to heal completely. If your symptoms dont improve or they get worse, talk with your doctor. You may need a repeat X-ray. If X-rays were taken, you will be told of any new findings that may affect your care.  When to seek medical advice  Call your healthcare provider right away if any of these occur:  · Pain or swelling increases  · Fingers or hand becomes cold, blue, numb, or tingly  Date Last Reviewed: 11/20/2015  © 7904-0618 [employer]. [address] [place] [address], [place] [address]. All rights reserved. This information is  not intended as a substitute for professional medical care. Always follow your healthcare professional's instructions.

## 2019-11-05 NOTE — PATIENT INSTRUCTIONS
Today's X ray read  Three views of the right demonstrate no acute fracture or dislocation.      Impression       No acute bony abnormality detected.           If you were prescribed a narcotic or controlled medication, do not drive or operate heavy equipment or machinery while taking these medications.  You must understand that you've received an Urgent Care treatment only and that you may be released before all your medical problems are known or treated. You, the patient, will arrange for follow up care as instructed.  Follow up with your PCP or specialty clinic as directed within 2-5 days if not improved or as needed.  You can call [phone] to schedule an appointment with the appropriate provider.  If your condition worsens we recommend that you receive another evaluation at the emergency room immediately or contact your primary medical clinics after hours call service to discuss your concerns.  Please return here or go to the Emergency Department for any concerns or worsening of condition.      Self-Care for Low Back Pain    Most people have low back pain now and then. In many cases, it isnt serious and self-care can help. Sometimes low back pain can be a sign of a bigger problem. Call your healthcare provider if your pain returns often or gets worse over time. For the long-term care of your back, get regular exercise, lose any excess weight and learn good posture.  Take a short rest  Lying down during the day may be beneficial for short periods of time if severe pain increases with sitting or standing. Long-term bed rest could be detrimental.  Reduce pain and swelling  Cold reduces swelling. Both cold and heat can reduce pain. Protect your skin by placing a towel between your body and the ice or heat source.  · For the first few days, apply an ice pack for 15 to 20 minutes .  · After the first few days, try heat for 15 minutes at a time to ease pain. Never sleep on a heating pad.  · Over-the-counter  medicine can help control pain and swelling. Try aspirin or ibuprofen.  Exercise  Exercise can help your back heal. It also helps your back get stronger and more flexible, preventing any reinjury. Ask your healthcare provider about specific exercises for your back.  Use good posture to avoid reinjury  · When moving, bend at the hips and knees. Dont bend at the waist or twist around.  · When lifting, keep the object close to your body. Dont try to lift more than you can handle.  · When sitting, keep your lower back supported. Use a rolled-up towel as needed.  Seek immediate medical care if:  · Youre unable to stand or walk.  · You have a temperature over 100.4°F (38.0°C)  · You have frequent, painful, or bloody urination.  · You have severe abdominal pain.  · You have a sharp, stabbing pain.  · Your pain is constant.  · You have pain or numbness in your leg.  · You feel pain in a new area of your back.  · You notice that the pain isnt decreasing after more than a week.   Date Last Reviewed: 9/29/2015  © 4849-2284 [employer]. [address] [place] [address]. All rights reserved. This information is not intended as a substitute for professional medical care. Always follow your healthcare professional's instructions.        Wrist Sprain  A sprain is an injury to the ligaments or capsule that holds a joint together. There are no broken bones. Most sprains take about 3 to 6 weeks to heal. If it a severe sprain where the ligament is completely torn, it can take months to recover.     Most wrist sprains are treated with a splint, wrist brace, or elastic wrap for support. Severe sprains may require surgery.  Home care  · Keep your arm elevated to reduce pain and swelling. This is very important during the first 48 hours.  · Apply an ice pack over the injured area for 15 to 20 minutes every 3 to 6 hours. You should do this for the first 24 to 48 hours. You can make an ice pack by filling a plastic  bag that seals at the top with ice cubes and then wrapping it with a thin towel. Continue to use ice packs for relief of pain and swelling as needed. As the ice melts, be careful to avoid getting your wrap, splint, or cast wet. After 48 hours, apply heat (warm shower or warm bath) for 15 to 20 minutes several times a day, or alternate ice and heat.   · You may use over-the-counter pain medicine to control pain, unless another pain medicine was prescribed. If you have chronic liver or kidney disease or ever had a stomach ulcer or GI bleeding, talk with your doctor before using these medicines.  · If you were given a splint or brace, wear it for the time advised by your doctor.  Follow-up care  Follow up with your healthcare provider as advised. Any X-rays you had today dont show any broken bones, breaks, or fractures. Sometimes fractures dont show up on the first X-ray. Bruises and sprains can sometimes hurt as much as a fracture. These injuries can take time to heal completely. If your symptoms dont improve or they get worse, talk with your doctor. You may need a repeat X-ray. If X-rays were taken, you will be told of any new findings that may affect your care.  When to seek medical advice  Call your healthcare provider right away if any of these occur:  · Pain or swelling increases  · Fingers or hand becomes cold, blue, numb, or tingly  Date Last Reviewed: 11/20/2015  © 1676-0353 [employer]. [address] [place] [address], [place] [address]. All rights reserved. This information is not intended as a substitute for professional medical care. Always follow your healthcare professional's instructions.

## 2019-11-07 ENCOUNTER — PATIENT MESSAGE (OUTPATIENT)
Dept: ORTHOPEDICS | Facility: CLINIC | Age: 29
End: 2019-11-07

## 2019-11-08 ENCOUNTER — TELEPHONE (OUTPATIENT)
Dept: ORTHOPEDICS | Facility: CLINIC | Age: 29
End: 2019-11-08

## 2019-11-08 ENCOUNTER — PATIENT MESSAGE (OUTPATIENT)
Dept: ORTHOPEDICS | Facility: CLINIC | Age: 29
End: 2019-11-08

## 2019-11-08 DIAGNOSIS — M51.36 DDD (DEGENERATIVE DISC DISEASE), LUMBAR: Primary | ICD-10-CM

## 2019-11-13 ENCOUNTER — OFFICE VISIT (OUTPATIENT)
Dept: ORTHOPEDICS | Facility: CLINIC | Age: 29
End: 2019-11-13
Payer: OTHER GOVERNMENT

## 2019-11-13 ENCOUNTER — TELEPHONE (OUTPATIENT)
Dept: ORTHOPEDICS | Facility: CLINIC | Age: 29
End: 2019-11-13

## 2019-11-13 ENCOUNTER — HOSPITAL ENCOUNTER (OUTPATIENT)
Dept: RADIOLOGY | Facility: OTHER | Age: 29
Discharge: HOME | End: 2019-11-13
Attending: PHYSICIAN ASSISTANT
Payer: OTHER GOVERNMENT

## 2019-11-13 ENCOUNTER — PATIENT MESSAGE (OUTPATIENT)
Dept: ORTHOPEDICS | Facility: CLINIC | Age: 29
End: 2019-11-13

## 2019-11-13 VITALS
HEIGHT: 66 IN | SYSTOLIC BLOOD PRESSURE: 136 MMHG | HEART RATE: 73 BPM | BODY MASS INDEX: 24.11 KG/M2 | DIASTOLIC BLOOD PRESSURE: 85 MMHG | WEIGHT: 150 LBS

## 2019-11-13 DIAGNOSIS — M25.531 RIGHT WRIST PAIN: Primary | ICD-10-CM

## 2019-11-13 DIAGNOSIS — M25.531 RIGHT WRIST PAIN: ICD-10-CM

## 2019-11-13 PROCEDURE — 73110 X-RAY EXAM OF WRIST: CPT | Mod: 26,RT,, | Performed by: RADIOLOGY

## 2019-11-13 PROCEDURE — 99204 OFFICE O/P NEW MOD 45 MIN: CPT | Mod: S$GLB,,, | Performed by: PHYSICIAN ASSISTANT

## 2019-11-13 PROCEDURE — 99204 PR OFFICE/OUTPT VISIT, NEW, LEVL IV, 45-59 MIN: ICD-10-PCS | Mod: S$GLB,,, | Performed by: PHYSICIAN ASSISTANT

## 2019-11-13 PROCEDURE — 99999 PR PBB SHADOW E&M-EST. PATIENT-LVL III: CPT | Mod: PBBFAC,,, | Performed by: PHYSICIAN ASSISTANT

## 2019-11-13 PROCEDURE — 73110 XR WRIST COMPLETE 3 VIEWS RIGHT: ICD-10-PCS | Mod: 26,RT,, | Performed by: RADIOLOGY

## 2019-11-13 PROCEDURE — 73110 X-RAY EXAM OF WRIST: CPT | Mod: TC,FY,RT

## 2019-11-13 PROCEDURE — 99999 PR PBB SHADOW E&M-EST. PATIENT-LVL III: ICD-10-PCS | Mod: PBBFAC,,, | Performed by: PHYSICIAN ASSISTANT

## 2019-11-13 NOTE — LETTER
[facility] [address] [address]  [address], [address]  [facility] [address]  Phone: [phone]     11/13/2019    To Whom It May Concern,      Gurpreet Youngblood was seen today for a right wrist injury. He had a fall on 11/4/19 onto the hand. X-rays are negative for fracture, he likely has a wrist sprain. We have recommended occupational therapy and bracing. He may return to work with restrictions. Currently, he has restrictions which include no lifting more than 5 lbs with the right hand/ wrist and use of the wrist brace as needed during activity. He does have upcoming evaluation with other specialties regarding other injuries sustained at the time of injury.     If you have questions, please call.     Sincerely,         Jayne Reid PA-C  [facility]   [facility]  [place], [place]

## 2019-11-13 NOTE — PROGRESS NOTES
Subjective:      Patient ID: Gurpreet Youngblood is a 29 y.o. male.    Chief Complaint: Pain of the Right Wrist      HPI  Gurpreet Youngblood is a right hand dominant 29 y.o. male presenting today for right wrist pain. Pt had a fall 11/4/19, he fell backwards from a bar height stool, he fell on an outstretched right hand. This did occur at work, he works in [employer]. He reports he has chosen to use his personal insurance for this injury. He noted pain in the wrist following injury. He was seen at urgent care and placed into a wrist brace. He continues to have pain. Pain is increased with activity. He takes Tylenol as needed. Denies numbness. He did have other injuries sustained during the fall including back pain, he is seeing other providers for this.      Review of patient's allergies indicates:  No Known Allergies      Current Outpatient Medications   Medication Sig Dispense Refill    cephALEXin (KEFLEX) 500 MG capsule       ibuprofen (ADVIL,MOTRIN) 600 MG tablet Take 1 tablet (600 mg total) by mouth every 6 (six) hours as needed for Pain. 20 tablet 0    predniSONE (DELTASONE) 10 MG tablet       promethazine (PHENERGAN) 6.25 mg/5 mL syrup       BIKTARVY -25 mg per tablet        No current facility-administered medications for this visit.        Past Medical History:   Diagnosis Date    Anxiety     Concussion     Fatigue     Headache(784.0)     HIV (human immunodeficiency virus infection)     Immune deficiency disorder     PTSD (post-traumatic stress disorder)        History reviewed. No pertinent surgical history.    Review of Systems:  Constitutional: Negative for chills and fever.   Respiratory: Negative for cough and shortness of breath.    Gastrointestinal: Negative for nausea and vomiting.   Skin: Negative for rash.   Neurological: Negative for dizziness and headaches.   Psychiatric/Behavioral: Negative for depression.   MSK as in HPI       OBJECTIVE:     PHYSICAL EXAM:  /85   Pulse  "73   Ht 5' 6" (1.676 m)   Wt 68 kg (150 lb)   BMI 24.21 kg/m²     GEN:  NAD, well-developed, well-groomed.  NEURO: Awake, alert, and oriented. Normal attention and concentration.    PSYCH: Normal mood and affect. Behavior is normal.  HEENT: No cervical lymphadenopathy noted.  CARDIOVASCULAR: Radial pulses 2+ bilaterally. No LE edema noted.  PULMONARY: Breath sounds normal. No respiratory distress.  SKIN: Intact, no rashes.      MSK:   RUE:  Good active ROM of the wrist and fingers. He is ttp throughout the dorsal wrist joint line, greatest on the mid joint line and ulnar aspect of the wrist. AIN/PIN/Radial/Median/Ulnar Nerves assessed in isolation without deficit. Radial & Ulnar arteries palpated 2+. Capillary Refill <3s.    RADIOGRAPHS:  Xray right wrist 11/4/19  FINDINGS:  Three views of the right demonstrate no acute fracture or dislocation.  Comments: I have personally reviewed the imaging and I agree with the above radiologist's report.    ASSESSMENT/PLAN:       ICD-10-CM ICD-9-CM   1. Right wrist pain M25.531 719.43       Orders Placed This Encounter    X-Ray Wrist Complete Right    Ambulatory Referral to Physical/Occupational Therapy     Plan:   -will get updated xrays today   -xray unremarkable for fracture. Treatment options discussed. We will proceed with OT. Continue bracing as needed.   -pt requests work letter   -RTC 4 weeks        The patient indicates understanding of these issues and agrees to the plan.    Jayne Reid PA-C  [facility]   [facility] [place], [place]    "

## 2019-11-13 NOTE — TELEPHONE ENCOUNTER
----- Message from Meseret Griffiths sent at 11/13/2019 11:43 AM CST -----  Contact: Maegan   Type: Patient Call Back    Who called:Maegan     What is the request in detail: Maegan is requesting a call back. She is requesting office notes from [facility] 11/12/19 to be faxed to [phone]. Please advise.     Can the clinic reply by [facility]? No    Best call back number: [phone]    Additional Information: N/A

## 2019-11-14 ENCOUNTER — PATIENT MESSAGE (OUTPATIENT)
Dept: ORTHOPEDICS | Facility: CLINIC | Age: 29
End: 2019-11-14

## 2019-11-18 ENCOUNTER — TELEPHONE (OUTPATIENT)
Dept: ORTHOPEDICS | Facility: CLINIC | Age: 29
End: 2019-11-18

## 2019-11-18 ENCOUNTER — CLINICAL SUPPORT (OUTPATIENT)
Dept: REHABILITATION | Facility: HOSPITAL | Age: 29
End: 2019-11-18
Payer: COMMERCIAL

## 2019-11-18 DIAGNOSIS — M25.531 PAIN IN RIGHT WRIST: ICD-10-CM

## 2019-11-18 DIAGNOSIS — Z78.9 DECREASED INDEPENDENCE WITH ACTIVITIES OF DAILY LIVING: ICD-10-CM

## 2019-11-18 PROCEDURE — 97110 THERAPEUTIC EXERCISES: CPT | Mod: PN

## 2019-11-18 PROCEDURE — 97166 OT EVAL MOD COMPLEX 45 MIN: CPT | Mod: PN

## 2019-11-18 PROCEDURE — 97022 WHIRLPOOL THERAPY: CPT | Mod: PN

## 2019-11-18 NOTE — TELEPHONE ENCOUNTER
Kojo a voicemail for Bibi that she needs to request that information from medical records. Cannot supply her with patient information.

## 2019-11-18 NOTE — TELEPHONE ENCOUNTER
----- Message from Calos Castanon, Patient Care Assistant sent at 11/18/2019  4:26 PM CST -----  Contact: Bibi with [employer]   Name of Who is Calling: Bibi with [employer]     What is the request in detail: Requesting clinical notes for 11/13/19. Please contact to further discuss and advise      Can the clinic reply by [facility]: No    What Number to Call Back if not in [facility]:   [phone] fax [phone]

## 2019-11-18 NOTE — PATIENT INSTRUCTIONS
[facility] [place]  [phone]  RIGOBERTO FLOR OTD, OTR/L, CHT  OCCUPATIONAL THERAPIST, CERTIFIED HAND THERAPIST        Complete 10 repetitions of each exercise 4 times a day:          Holding pen, move wrist as you were throwing a dart lightly, repeat 10 times, pain free

## 2019-11-19 ENCOUNTER — PATIENT MESSAGE (OUTPATIENT)
Dept: ORTHOPEDICS | Facility: CLINIC | Age: 29
End: 2019-11-19

## 2019-11-19 ENCOUNTER — HOSPITAL ENCOUNTER (OUTPATIENT)
Dept: RADIOLOGY | Facility: HOSPITAL | Age: 29
Discharge: HOME | End: 2019-11-19
Attending: PHYSICIAN ASSISTANT
Payer: OTHER GOVERNMENT

## 2019-11-19 ENCOUNTER — OFFICE VISIT (OUTPATIENT)
Dept: ORTHOPEDICS | Facility: CLINIC | Age: 29
End: 2019-11-19
Payer: COMMERCIAL

## 2019-11-19 VITALS — WEIGHT: 150 LBS | HEIGHT: 66 IN | BODY MASS INDEX: 24.11 KG/M2

## 2019-11-19 DIAGNOSIS — M54.50 ACUTE BILATERAL LOW BACK PAIN WITHOUT SCIATICA: Primary | ICD-10-CM

## 2019-11-19 DIAGNOSIS — M51.36 DDD (DEGENERATIVE DISC DISEASE), LUMBAR: ICD-10-CM

## 2019-11-19 PROCEDURE — 99214 OFFICE O/P EST MOD 30 MIN: CPT | Mod: S$GLB,,, | Performed by: PHYSICIAN ASSISTANT

## 2019-11-19 PROCEDURE — 72100 XR LUMBAR SPINE AP AND LAT WITH FLEX/EXT: ICD-10-PCS | Mod: 26,,, | Performed by: RADIOLOGY

## 2019-11-19 PROCEDURE — 72100 X-RAY EXAM L-S SPINE 2/3 VWS: CPT | Mod: TC

## 2019-11-19 PROCEDURE — 72120 XR LUMBAR SPINE AP AND LAT WITH FLEX/EXT: ICD-10-PCS | Mod: 26,,, | Performed by: RADIOLOGY

## 2019-11-19 PROCEDURE — 72100 X-RAY EXAM L-S SPINE 2/3 VWS: CPT | Mod: 26,,, | Performed by: RADIOLOGY

## 2019-11-19 PROCEDURE — 99999 PR PBB SHADOW E&M-EST. PATIENT-LVL III: CPT | Mod: PBBFAC,,, | Performed by: PHYSICIAN ASSISTANT

## 2019-11-19 PROCEDURE — 72120 X-RAY BEND ONLY L-S SPINE: CPT | Mod: 26,,, | Performed by: RADIOLOGY

## 2019-11-19 PROCEDURE — 99214 PR OFFICE/OUTPT VISIT, EST, LEVL IV, 30-39 MIN: ICD-10-PCS | Mod: S$GLB,,, | Performed by: PHYSICIAN ASSISTANT

## 2019-11-19 PROCEDURE — 99999 PR PBB SHADOW E&M-EST. PATIENT-LVL III: ICD-10-PCS | Mod: PBBFAC,,, | Performed by: PHYSICIAN ASSISTANT

## 2019-11-19 RX ORDER — MELOXICAM 15 MG/1
15 TABLET ORAL DAILY
Qty: 30 TABLET | Refills: 0 | Status: SHIPPED | OUTPATIENT
Start: 2019-11-19 | End: 2019-12-19

## 2019-11-19 RX ORDER — METHOCARBAMOL 750 MG/1
750 TABLET, FILM COATED ORAL 3 TIMES DAILY
Qty: 60 TABLET | Refills: 0 | Status: SHIPPED | OUTPATIENT
Start: 2019-11-19 | End: 2019-12-09

## 2019-11-19 NOTE — TELEPHONE ENCOUNTER
Called patient to discuss that if he is worker's comp, he needs to be seen by [facility]. Any information that his job is requesting needs to be requested from medical records.     Unable to make contact;left a voicemail.

## 2019-11-19 NOTE — PLAN OF CARE
[facility] Occupational Therapy  Initial Evaluation     Date: 11/18/2019  Name: Gurpreet Youngblood  Clinic Number: [id number]    Therapy Diagnosis:   Encounter Diagnoses   Name Primary?    Pain in right wrist     Decreased independence with activities of daily living      Physician: Jayne Reid PA-C    Physician Orders: eval and treat  Medical Diagnosis: M25.531 (ICD-10-CM) - Right wrist pain   Date of Injury/Onset: 11/04/2019  Evaluation Date: 11/18/2019  Insurance Authorization Period Expiration: 12/31/2019  Plan of Care Certification Period: 11/18/2019-01/17/2020  Date of Return to MD: TBD    Visit # / Visits authorized: 1/ 38 * HARD LIMIT- COMBINED WITH PT*    FOTO: initial eval       Time In:230pm  Time Out: 330pm  Total treatment time: 60minutes  Total Timed [id number] minutes    Precautions:  Standard and HIV precautions    Subjective     Involved Side: Right  Dominant Side: Right  Date of Onset: 11/04/2019- 2 weeks from injury  Mechanism of Injury: trauma  History of Current Condition: Pt. States he fell after the back of a chair broke off  And he fell and broke his fall on an outstretched hand. He saw the MD for work and they did an xray and gave him a splint. He did not here anything else from that MD so he f/u with the hand surgeon's office at [facility], operated by [facility] and they dx him with a sprain and told him to keep wearing the brace and no lifting >5 #. He presents today for evaluation.  He states he also hurt his back and he is seeing the [facility] for spine doctor.   Surgical Procedure: NA  Imaging: xray - no fx  Previous Therapy: pt had 12 visits from PT for neck pain    Past Medical History/Physical Systems Review:   Gurpreet Youngblood  has a past medical history of Anxiety, Concussion, Fatigue, Headache(784.0), HIV (human immunodeficiency virus infection), Immune deficiency disorder, and PTSD (post-traumatic stress disorder).    Gurpreet Youngblood  has no past surgical history on  file.    Gurpreet has a current medication list which includes the following prescription(s): biktarvy, cephalexin, ibuprofen, prednisone, and promethazine.    Review of patient's allergies indicates:  No Known Allergies     Patient's Goals for Therapy: to regain use of right hand    Pain:  Functional Pain Scale Rating 0-10:   4/10 on average  1/10 at best  5/10 at worst  Location: along radial wrist, up volar FA at times , and along 2nd MC in webspace at times  Description: Sharp and Shooting  Aggravating Factors: Lifting and using it for light activities   Easing Factors: rest and OTC pain meds    Occupation:  [employer] and [employer]  Working presently: employed- on medical leave  Duties: lifting>50#     Functional Limitations/Social History:    Previous functional status includes: Independent with all ADLs.    Current FunctionalStatus   Home/Living environment : lives with their mother who is s/p CVA and requires some assistance occasionally      Limitation of Functional Status as follows:   ADLs/IADLs:     - Feeding: difficulty with opening containers    - Bathing: no difficulty but not really using hand too much    - Dressing/Grooming: no difficulty     - Driving: wearing brace- extra careful     Leisure: Cannot ride 4 hanks        Objective     Observation/Appearance: pt presents off the shelf wrist orthosis with thumb spica    Edema. Measured in centimeters.   11/18/2019 11/18/2019    Left Right             Wrist Crease 18.0 18.0        MCPs 22.0 22.0       Elbow and Wrist ROM. Measured in degrees.   11/18/2019 11/18/2019    Left Right    AROM AROM   Supination/Pronation 60/60 50/60   Wrist Ext/Flex 75/60 50/50   Wrist RD/UD 25/30 15/15     Hand ROM. Measured in degrees.   11/18/2019 11/18/2019    Left Right   Composite fist yes yes        Thumb: MP 50 35                IP 46 55       Rad ADD/ABD 45 45       Pal ADD/ABD 50 45          Opposition SFMP crs SFMP crease tight      Strength (Dynamometer) and  Pinch Strength (Pinch Gauge)  Measured in pounds.   11/18/2019 11/18/2019    Left Right   Rung II 90 45   Key Pinch 27 24   3pt Pinch 30 18     Sensation:   Occasionally tingling feeling and sometimes colder than the other side            CMS Impairment/Limitation/Restriction for FOTO wrist Survey    Therapist reviewed FOTO scores for Gurpreet Youngblood on 11/18/2019.   FOTO documents entered into [employer] - see Media section.    Limitation Score: 44%           Treatment     Treatment Time In: 300pm  Treatment Time Out: 325pm  Total Treatment time separate from Evaluation time:25 min    Gurpreet received the following supervised modalities after being cleared for contradictions for 15 minutes:   -Fluidotherapy: To continuous air, 115 deg, air speed 50 to decrease pain, edema & scar tissue, sensory re- education, and increased tissue extensibility prior to therex      Gurpreet received therapeutic exercises for 10 minutes including:  -  AROM   Sup/pro  Dart Throwers Motion  Thumb palmar ABD  Pinky slides   X 10 reps each            Home Exercise Program/Education:  Issued HEP (see patient instructions in EMR) and educated on modality use for pain management . Exercises were reviewed and Gurpreet was able to demonstrate them prior to the end of the session.   Pt received a written copy of exercises to perform at home. Gurpreet demonstrated good  understanding of the education provided.  Pt was advised to perform these exercises free of pain, and to stop performing them if pain occurs.    Patient/Family Education: role of OT, goals for OT, scheduling/cancellations - pt verbalized understanding. Discussed insurance limitations with patient.    Additional Education provided: use of ice/heat PRN, continue orthotic wear, use wrist rest for typing,     Assessment     Gurpreet Youngblood is a 29 y.o. male referred to outpatient occupational therapy and presents with a medical diagnosis of right wrist pain and demonstrates limitations as  described in the chart below. Following medical record review it is determined that pt will benefit from occupational therapy services in order to maximize pain free and/or functional use of right wrist. The following goals were discussed with the patient and patient is in agreement with them as to be addressed in the treatment plan. The patient's rehab potential is Excellent.     Anticipated barriers to occupational therapy: none  Pt has no cultural, educational or language barriers to learning provided.    Profile and History Assessment of Occupational Performance Level of Clinical Decision Making Complexity Score   Occupational Profile:   Gurpreet Youngblood is a 29 y.o. 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 Gurpreet Youngblood has difficulty with  feeding  shopping, phone/computer use and housework/household chores  affecting his/her daily functional abilities. His/her main goal for therapy is to regain use of right dominant hand.     Comorbidities:   Past Medical History:   Diagnosis Date    Anxiety     Concussion     Fatigue     Headache(784.0)     HIV (human immunodeficiency virus infection)     Immune deficiency disorder     PTSD (post-traumatic stress disorder)          Medical and Therapy History Review:   Brief               Performance Deficits    Physical:  Joint Mobility  Muscle Power/Strength  Muscle Endurance   Strength  Pinch Strength  Gross Motor Coordination  Fine Motor Coordination  Pain    Cognitive:  No Deficits    Psychosocial:    No Deficits     Clinical Decision Making:  low    Assessment Process:  Problem-Focused Assessments    Modification/Need for Assistance:  Not Necessary    Intervention Selection:  Several Treatment Options       low  Based on PMHX, co morbidities , data from assessments and functional level of assistance required with task and clinical presentation directly impacting function.          Goals:   The following goals were discussed with the patient and patient is in agreement with them as to be addressed in the treatment plan.   Long Term Goals (LTGs); to be met by discharge.  LTG #1: Pt will report a pain level of 0 out of 10 with ADLS   LTG #2: Pt will demo improved FOTO score by 20 points.   LTG #3: Pt will return to prior level of function for ADLs and household management.     Short Term Goals (STGs); to be met within 4 weeks (12/20/2019).  STG #1a: Pt will report 2 out of 10 pain level with ADLS.  STG #2a: Pt will report/demo [place] with typing for return to work .  STG #2b: Pt will report/demo [place] with opening jars/containers for return to self care and simple meal prep  STG #3a: Pt will demonstrate independence with issued HEP.   STG #3b: Pt will demo improved   strength on the right by 10# in order to return to work occupations        Plan   Certification Period/Plan of care expiration: 11/18/2019 to 01/17/2020.    Outpatient Occupational Therapy 1 time weekly for 8 weeks to include the following interventions: Paraffin, Fluidotherapy, Manual therapy/joint mobilizations, Modalities for pain management, US 3 mhz, Therapeutic exercises/activities., Strengthening and Joint Protection.      Dinorah Phoenix, OTR/L,CHT

## 2019-11-19 NOTE — PROGRESS NOTES
DATE: 11/19/2019  PATIENT: Gurpreet Youngblood    Supervising Physician: Amos Segovia M.D.    CHIEF COMPLAINT: back pain    HISTORY:  Gurpreet Youngblood is a 29 y.o. male previously seen by me for his neck here for initial evaluation of low back pain (Back - 4, Leg - 0).  The pain has been present since he fell out of a chair at work on November 4.  He says the back of the chair fell off and he fell onto his back and right hand.  He is seeing Jayne Reid for his wrist.  He hit his head on the xray machine as well.  He has no history of back pain prior to the fall.  The patient describes the pain as aching.  The pain is worse with sitting, standing and lying flat and improved by nothing in particular. There is no associated numbness and tingling. There is no subjective weakness. Prior treatments have included ibuprofen, but no physical therapy, ESIs or surgery.  He also reports some increased neck pain since the fall.  Prior to the fall his neck pain had improved with PT.     The patient denies myelopathic symptoms such as handwriting changes or difficulty with buttons/coins/keys. Denies perineal paresthesias, bowel/bladder dysfunction.    PAST MEDICAL/SURGICAL HISTORY:  Past Medical History:   Diagnosis Date    Anxiety     Concussion     Fatigue     Headache(784.0)     HIV (human immunodeficiency virus infection)     Immune deficiency disorder     PTSD (post-traumatic stress disorder)      History reviewed. No pertinent surgical history.    Medications:   Current Outpatient Medications on File Prior to Visit   Medication Sig Dispense Refill    cephALEXin (KEFLEX) 500 MG capsule       ibuprofen (ADVIL,MOTRIN) 600 MG tablet Take 1 tablet (600 mg total) by mouth every 6 (six) hours as needed for Pain. 20 tablet 0    predniSONE (DELTASONE) 10 MG tablet       promethazine (PHENERGAN) 6.25 mg/5 mL syrup       BIKTARVY -25 mg per tablet        No current facility-administered medications on  file prior to visit.        Social History:   Social History     Socioeconomic History    Marital status: Single     Spouse name: Not on file    Number of children: 0    Years of education: Not on file    Highest education level: Not on file   Occupational History    Not on file   Social Needs    Financial resource strain: Not on file    Food insecurity:     Worry: Not on file     Inability: Not on file    Transportation needs:     Medical: Not on file     Non-medical: Not on file   Tobacco Use    Smoking status: Never Smoker    Smokeless tobacco: Never Used   Substance and Sexual Activity    Alcohol use: No     Alcohol/week: 0.0 standard drinks    Drug use: No    Sexual activity: Yes     Partners: Female   Lifestyle    Physical activity:     Days per week: Not on file     Minutes per session: Not on file    Stress: Not on file   Relationships    Social connections:     Talks on phone: Not on file     Gets together: Not on file     Attends Mandaeism service: Not on file     Active member of club or organization: Not on file     Attends meetings of clubs or organizations: Not on file     Relationship status: Not on file   Other Topics Concern    Patient feels they ought to cut down on drinking/drug use Not Asked    Patient annoyed by others criticizing their drinking/drug use Not Asked    Patient has felt bad or guilty about drinking/drug use Not Asked    Patient has had a drink/used drugs as an eye opener in the AM Not Asked   Social History Narrative    Not on file       REVIEW OF SYSTEMS:  Constitution: Negative. Negative for chills, fever and night sweats.   Cardiovascular: Negative for chest pain and syncope.   Respiratory: Negative for cough and shortness of breath.   Gastrointestinal: See HPI. Negative for nausea/vomiting. Negative for abdominal pain.  Genitourinary: See HPI. Negative for discoloration or dysuria.  Skin: Negative for dry skin, itching and rash.   Hematologic/Lymphatic:  "Negative for bleeding problem. Does not bruise/bleed easily.   Musculoskeletal: Negative for falls and muscle weakness.   Neurological: See HPI. No seizures.   Endocrine: Negative for polydipsia, polyphagia and polyuria.   Allergic/Immunologic: Negative for hives and persistent infections.     EXAM:  Ht 5' 6" (1.676 m)   Wt 68 kg (150 lb)   BMI 24.21 kg/m²     General: The patient is a very pleasant 29 y.o. male in no apparent distress, the patient is oriented to person, place and time.  Psych: Normal mood and affect  HEENT: Vision grossly intact, hearing intact to the spoken word.  Lungs: Respirations unlabored.  Gait: Normal station and gait, no difficulty with toe or heel walk.   Skin: Dorsal lumbar skin negative for rashes, lesions, hairy patches and surgical scars. There is mild lumbar tenderness to palpation.  Range of motion: Lumbar range of motion is acceptable.  Spinal Balance: Global saggital and coronal spinal balance acceptable, not significant for scoliosis and kyphosis.  Musculoskeletal: No pain with the range of motion of the bilateral hips. No trochanteric tenderness to palpation.  Vascular: Bilateral lower extremities warm and well perfused, dorsalis pedis pulses 2+ bilaterally.  Neurological: Normal strength and tone in all major motor groups in the bilateral lower extremities. Normal sensation to light touch in the L2-S1 dermatomes bilaterally.  Deep tendon reflexes symmetric 2+ in the bilateral lower extremities.  Negative Babinski bilaterally. Straight leg raise negative bilaterally.    IMAGING:      Today I personally reviewed AP, Lat and Flex/Ex  upright L-spine films that demonstrate normal disc spacing and alignment.  No acute abnormalities.         Body mass index is 24.21 kg/m².    No results found for: HGBA1C        ASSESSMENT/PLAN:    Gurpreet was seen today for follow-up, low-back pain and mid-back pain.    Diagnoses and all orders for this visit:    Acute bilateral low back pain without " sciatica  -     meloxicam (MOBIC) 15 MG tablet; Take 1 tablet (15 mg total) by mouth once daily.  -     methocarbamol (ROBAXIN) 750 MG Tab; Take 1 tablet (750 mg total) by mouth 3 (three) times daily. As needed for muscle spasms for 60 doses  -     Ambulatory Referral to Physical/Occupational Therapy      The patient is having low back pain without radicular or myelopathic symptoms since falling out of a chair at work.     Today we discussed at length all of the different treatment options including anti-inflammatories, acetaminophen, rest, ice, heat, physical therapy including strengthening and stretching exercises, home exercises, ROM, aerobic conditioning, aqua therapy, other modalities including ultrasound, massage, and dry needling, epidural steroid injections and finally surgical intervention.      Orders placed for PT.  Follow up after therapy if symptoms persist.     From an orthopedic spine standpoint, the patient is able to return to work.  I gave him a note stating he may return 11/26 with lifting restrictions.  He may require breaks to sit as well.     Follow up if symptoms worsen or fail to improve.

## 2019-11-20 ENCOUNTER — TELEPHONE (OUTPATIENT)
Dept: ORTHOPEDICS | Facility: CLINIC | Age: 29
End: 2019-11-20

## 2019-11-20 NOTE — TELEPHONE ENCOUNTER
I informed Maegan that if she wanted any information that request needs to go to medical records or receive approval from patient as he was not seen as a worker's comp case.     After explaining this repeatedly, Maegan verbalized understanding.

## 2019-11-20 NOTE — TELEPHONE ENCOUNTER
----- Message from Tena Appiah sent at 11/19/2019  1:14 PM CST -----  Contact: Maegan from homeland security            Name of Who is Calling: Maegan from [employer]      What is the request in detail: Maegan from homeland security was calling for patient work status.Please contact to further discuss and advise.      Can the clinic reply by [facility]: N      What Number to Call Back if not [facility]: [phone]  ESTEFANIA

## 2019-11-22 ENCOUNTER — PATIENT MESSAGE (OUTPATIENT)
Dept: ORTHOPEDICS | Facility: CLINIC | Age: 29
End: 2019-11-22

## 2019-11-22 DIAGNOSIS — M25.531 RIGHT WRIST PAIN: Primary | ICD-10-CM

## 2019-11-25 ENCOUNTER — CLINICAL SUPPORT (OUTPATIENT)
Dept: REHABILITATION | Facility: HOSPITAL | Age: 29
End: 2019-11-25
Payer: COMMERCIAL

## 2019-11-25 DIAGNOSIS — Z78.9 DECREASED INDEPENDENCE WITH ACTIVITIES OF DAILY LIVING: ICD-10-CM

## 2019-11-25 DIAGNOSIS — M25.531 PAIN IN RIGHT WRIST: ICD-10-CM

## 2019-11-25 PROCEDURE — 97110 THERAPEUTIC EXERCISES: CPT | Mod: PN

## 2019-11-25 PROCEDURE — 97022 WHIRLPOOL THERAPY: CPT | Mod: PN

## 2019-11-25 NOTE — PROGRESS NOTES
"  Occupational Therapy Daily Treatment Note      Date: 11/25/2019  Name: Gurpreet Youngblood  Clinic Number: [id number]    Therapy Diagnosis:   Encounter Diagnoses   Name Primary?    Pain in right wrist     Decreased independence with activities of daily living      Physician: Lucia Arreola PA-C    Physician Orders: eval and treat  Medical Diagnosis: M25.531 (ICD-10-CM) - Right wrist pain   Date of Injury/Onset: 11/04/2019  Evaluation Date: 11/18/2019  Insurance Authorization Period Expiration: 12/31/2019  Plan of Care Certification Period: 11/18/2019-01/17/2020  Date of Return to MD: TBD     Visit # / Visits authorized: 2/ 38 * HARD LIMIT- COMBINED WITH PT*     FOTO: initial eval        Time In:1pm  Time Out:150pm  Total treatment time: 50minutes  Total Timed minutes: 35 minutes     Precautions:  Standard and HIV precautions       Subjective     Pt reports: " It feels a little better"  he was compliant with home exercise program given last session.   Response to previous treatment:decreased pain  Functional change: none reported    Pain: 3/10  Location: right wrist    Objective        Edema. Measured in centimeters.    11/18/2019 11/18/2019     Left Right                   Wrist Crease 18.0 18.0           MCPs 22.0 22.0         Elbow and Wrist ROM. Measured in degrees.    11/18/2019 11/18/2019     Left Right     AROM AROM   Supination/Pronation 60/60 50/60   Wrist Ext/Flex 75/60 50/50   Wrist RD/UD 25/30 15/15      Hand ROM. Measured in degrees.    11/18/2019 11/18/2019     Left Right   Composite fist yes yes           Thumb: MP 50 35                IP 46 55       Rad ADD/ABD 45 45       Pal ADD/ABD 50 45          Opposition SFMP crs SFMP crease tight       Strength (Dynamometer) and Pinch Strength (Pinch Gauge)  Measured in pounds.    11/18/2019 11/18/2019     Left Right   Rung II 90 45   Key Pinch 27 24   3pt Pinch 30 18      Sensation:   Occasionally tingling feeling and sometimes colder than the " other side     Gurpreet received the following supervised modalities after being cleared for contradictions for 15 minutes:   -Fluidotherapy: To continuous air, 115 deg, air speed 50 to decrease pain, edema & scar tissue, sensory re- education, and increased tissue extensibility prior to therex        Gurpreet received therapeutic exercises for 35 minutes including:  -  AROM   Sup/pro  Dart Throwers Motion  Thumb palmar ABD  Pinky slides    X 10 reps each    Wrist dextericiser X 3 min   Isospheres X 3 min   In hand manipulation  X 1 container coins   Yellow theraputty X 2.5 min twirling  X 10 reps gentle squeeze   [place] wrist maze X 1 trial                   Home Exercises and Education Provided     Education provided: CONT HEP  - Progress towards goals     Written Home Exercises Provided: yes.  Exercises were reviewed and Gurpreet was able to demonstrate them prior to the end of the session.  Gurpreet demonstrated good  understanding of the education provided.   .   See EMR under Patient Instructions for exercises provided 11/25/2019.     Assessment     Pt. Presents for first visit after initial evaluation. He is feeling less pain. He feels he has more mobility after fluidotherapy. He is  Returning to work tomorrow on light duty. Added light functional activities this date. Pt. To perform light strengthening 1xday with yellow theraputty and work on maze norm on phone for wrist proprioception.   Gurpreet is progressing well towards his goals and there are no updates to goals at this time. Pt prognosis continues as Good. Pt will continue to benefit from skilled outpatient occupational therapy to address the deficits listed in the problem list on initial evaluation, provide pt/family education and to maximize pt's level of independence in the home and community environment.     Anticipated barriers to continued occupational therapy: none    Pt's spiritual, cultural and educational needs considered and pt agreeable to plan of care and  goals.    Goals     Goals:   The following goals were discussed with the patient and patient is in agreement with them as to be addressed in the treatment plan.   Long Term Goals (LTGs); to be met by discharge.  LTG #1: Pt will report a pain level of 0 out of 10 with ADLS  -progressing  LTG #2: Pt will demo improved FOTO score by 20 points.  -progressing  LTG #3: Pt will return to prior level of function for ADLs and household management.  -progressing     Short Term Goals (STGs); to be met within 4 weeks (12/20/2019).  STG #1a: Pt will report 2 out of 10 pain level with ADLS. -progressing  STG #2a: Pt will report/demo [place] with typing for return to work . -progressing  STG #2b: Pt will report/demo [place] with opening jars/containers for return to self care and simple meal prep -progressing  STG #3a: Pt will demonstrate independence with issued HEP.  -progressing  STG #3b: Pt will demo improved   strength on the right by 10# in order to return to work occupations   -progressing  Plan     Continue skilled occupational therapy (1xwk) with individualized plan of care focusing on increasing AROM and strength for return to daily and work occupations.       Updates/Grading for next session: cont to progress as able    Dinorah Phoenix, OTR/L,CHT

## 2019-11-25 NOTE — PATIENT INSTRUCTIONS
[facility]  OCCUPATIONAL THERAPY  HOME EXERCISE PROGRAM     Complete the following strengthening program 1x/day.         X 2-3 min     x 10 reps         ]

## 2019-11-26 ENCOUNTER — PATIENT MESSAGE (OUTPATIENT)
Dept: ORTHOPEDICS | Facility: CLINIC | Age: 29
End: 2019-11-26

## 2019-11-27 ENCOUNTER — OFFICE VISIT (OUTPATIENT)
Dept: ORTHOPEDICS | Facility: CLINIC | Age: 29
End: 2019-11-27
Attending: ORTHOPAEDIC SURGERY
Payer: COMMERCIAL

## 2019-11-27 DIAGNOSIS — M25.531 PAIN IN RIGHT WRIST: Primary | ICD-10-CM

## 2019-11-27 DIAGNOSIS — M25.531 RIGHT WRIST PAIN: ICD-10-CM

## 2019-11-27 PROCEDURE — 99999 PR PBB SHADOW E&M-EST. PATIENT-LVL III: ICD-10-PCS | Mod: PBBFAC,,, | Performed by: ORTHOPAEDIC SURGERY

## 2019-11-27 PROCEDURE — 99999 PR PBB SHADOW E&M-EST. PATIENT-LVL III: CPT | Mod: PBBFAC,,, | Performed by: ORTHOPAEDIC SURGERY

## 2019-11-27 PROCEDURE — 99213 PR OFFICE/OUTPT VISIT, EST, LEVL III, 20-29 MIN: ICD-10-PCS | Mod: S$GLB,,, | Performed by: ORTHOPAEDIC SURGERY

## 2019-11-27 PROCEDURE — 99213 OFFICE O/P EST LOW 20 MIN: CPT | Mod: S$GLB,,, | Performed by: ORTHOPAEDIC SURGERY

## 2019-11-27 RX ORDER — NABUMETONE 500 MG/1
500 TABLET, FILM COATED ORAL 2 TIMES DAILY WITH MEALS
Qty: 60 TABLET | Refills: 1 | Status: SHIPPED | OUTPATIENT
Start: 2019-11-27 | End: 2023-09-28 | Stop reason: HOSPADM

## 2019-11-27 NOTE — LETTER
November 27, 2019      [facility] [place] [facility] [address]  [address], [address]  [facility] [address]  Phone: [phone]       Patient: Gurpreet Youngblood   YOB: 1990  Date of Visit: 11/27/2019    To Whom It May Concern:    Donna Youngblood  was at [facility] on 11/27/2019. He may return to work on 11/28/2019 with restrictions, no lifting greater than 10 lbs. If you have any questions or concerns, or if I can be of further assistance, please do not hesitate to contact me.    Sincerely,      Arlyn Alexander LPN

## 2019-11-27 NOTE — LETTER
November 27, 2019      Jayne Reid PA-C  [address]  [facility] [address]           [facility] [place] [address] [facility] [address]  [address], [address]  [facility] [address]  Phone: [phone]          Patient: Gurpreet Youngblood   MR Number: [id number]   YOB: 1990   Date of Visit: 11/27/2019       Dear Jayne Reid:    Thank you for referring Gurpreet Youngblood to me for evaluation. Attached you will find relevant portions of my assessment and plan of care.    If you have questions, please do not hesitate to call me. I look forward to following Gurpreet Youngblood along with you.    Sincerely,    Zack Caputo Jr., MD    Enclosure  CC:  No Recipients    If you would like to receive this communication electronically, please contact [email or web address] or [phone] to request more information on [employer] access.    For providers and/or their staff who would like to refer a patient to [facility], please contact us through our one-stop-shop provider referral line, [facility], at [phone].    If you feel you have received this communication in error or would no longer like to receive these types of communications, please e-mail [email or web address]

## 2019-11-27 NOTE — PROGRESS NOTES
Subjective:      Patient ID: Gurpreet Youngblood is a 29 y.o. male.    Chief Complaint: Pain and Injury of the Right Wrist      HPI  Gurpreet Youngblood is a  29 y.o. male presenting today for right wrist injury.  There was a history of trauma.  Onset of symptoms began about 3 weeks ago when he fell at work on 11/04/2019  According to the patient he [place] at [employer] [place] any sustained injury to his right wrist. He basically landed on his right outstretched hand and wrist  He saw Jayne in the hand clinic and x-rays were taken reported to him is negative for fracture  He was placed in a wrist splint and referred to therapy  Feels like his symptoms have improved a little bit but he still having quite a bit of pain in the right wrist and has difficulty with use he is also noted in intermittent popping sensation in the right wrist with ulnar-sided wrist pain  No numbness or tingling is reported  .      Review of patient's allergies indicates:  No Known Allergies      Current Outpatient Medications   Medication Sig Dispense Refill    BIKTARVY -25 mg per tablet       ibuprofen (ADVIL,MOTRIN) 600 MG tablet Take 1 tablet (600 mg total) by mouth every 6 (six) hours as needed for Pain. 20 tablet 0    meloxicam (MOBIC) 15 MG tablet Take 1 tablet (15 mg total) by mouth once daily. 30 tablet 0    methocarbamol (ROBAXIN) 750 MG Tab Take 1 tablet (750 mg total) by mouth 3 (three) times daily. As needed for muscle spasms for 60 doses 60 tablet 0     No current facility-administered medications for this visit.        Past Medical History:   Diagnosis Date    Anxiety     Concussion     Fatigue     Headache(784.0)     HIV (human immunodeficiency virus infection)     Immune deficiency disorder     PTSD (post-traumatic stress disorder)        No past surgical history on file.    Review of Systems:  ROS    OBJECTIVE:     PHYSICAL EXAM:       Vitals:    11/27/19 1448   PainSc:   5      Well developed, well nourished male in no acute distress  Alert and oriented x 3  HEENT- Normal exam  Lungs- Clear to auscultation  Heart- Regular rate and rhythm  Abdomen- Soft nontender  Extremity exam- no previous hand or wrist problems reported examination right hand and wrist demonstrates some tenderness over the ulnar side of the right wrist  Range of motion of the wrist slightly decreased secondary to pain  He does have a clicking sensation with pronation supination of the wrist which does reproduce pain on the ulnar side of the wrist no significant instability to the DRUJ or the distal ulna  Guido test negative  Range of motion fingers full   strength slightly decreased  Tinel sign negative      RADIOGRAPHS:  X-ray AP and lateral x-rays reviewed right wrist demonstrate no fracture or dislocation  Comments: I have personally reviewed the imaging and I agree with the above radiologist's report.    ASSESSMENT/PLAN:     IMPRESSION:  1.  Right wrist sprain.  2.  Possible TFC tear (cartilage) tear right wrist    PLAN:   I explained the nature of the injury to the patient. I have recommended an MRI scan of the right wrist. This would be the only way to check the TFC and cartilage.  We could also look at the ligaments in the wrist at the same time  In the meantime continue with use of the wrist splint during the day he can have the splint off at night  I have started him on Relafen twice a day with food  And recommended light duty work no lifting more than 10 lb  Follow-up after the MRI is complete       - We talked at length about the anatomy and pathophysiology of   Encounter Diagnoses   Name Primary?    Right wrist pain     Pain in right wrist Yes           Disclaimer: This note has been generated using voice-recognition software. There may be typographical errors that have been missed during proof-reading.

## 2019-11-28 ENCOUNTER — PATIENT MESSAGE (OUTPATIENT)
Dept: ORTHOPEDICS | Facility: CLINIC | Age: 29
End: 2019-11-28

## 2019-12-02 ENCOUNTER — OFFICE VISIT (OUTPATIENT)
Dept: ORTHOPEDICS | Facility: CLINIC | Age: 29
End: 2019-12-02
Payer: OTHER GOVERNMENT

## 2019-12-02 VITALS
HEIGHT: 66 IN | BODY MASS INDEX: 24.91 KG/M2 | HEART RATE: 94 BPM | SYSTOLIC BLOOD PRESSURE: 144 MMHG | TEMPERATURE: 99 F | WEIGHT: 155 LBS | DIASTOLIC BLOOD PRESSURE: 81 MMHG

## 2019-12-02 DIAGNOSIS — M25.531 RIGHT WRIST PAIN: Primary | ICD-10-CM

## 2019-12-02 PROCEDURE — 99999 PR PBB SHADOW E&M-EST. PATIENT-LVL III: ICD-10-PCS | Mod: PBBFAC,,, | Performed by: ORTHOPAEDIC SURGERY

## 2019-12-02 PROCEDURE — 99213 PR OFFICE/OUTPT VISIT, EST, LEVL III, 20-29 MIN: ICD-10-PCS | Mod: S$GLB,,, | Performed by: ORTHOPAEDIC SURGERY

## 2019-12-02 PROCEDURE — 99999 PR PBB SHADOW E&M-EST. PATIENT-LVL III: CPT | Mod: PBBFAC,,, | Performed by: ORTHOPAEDIC SURGERY

## 2019-12-02 PROCEDURE — 99213 OFFICE O/P EST LOW 20 MIN: CPT | Mod: S$GLB,,, | Performed by: ORTHOPAEDIC SURGERY

## 2019-12-02 NOTE — TELEPHONE ENCOUNTER
Patient made appointment with doctor for this morning, Will further discuss this with the physician.

## 2019-12-02 NOTE — PROGRESS NOTES
"Subjective:      Patient ID: Gurpreet Youngblood is a 29 y.o. male.  Chief Complaint: Wrist Pain (right ) and Follow-up      HPI  Gurpreet Youngblood is a  29 y.o. male presenting today for follow up of right wrist injury.  He reports that he is doing about the same but he started to have a little bit of numbness in the hand  He has been wearing the wrist brace he is currently scheduled for the MRI this week and.    Review of patient's allergies indicates:  No Known Allergies      Current Outpatient Medications   Medication Sig Dispense Refill    methocarbamol (ROBAXIN) 750 MG Tab Take 1 tablet (750 mg total) by mouth 3 (three) times daily. As needed for muscle spasms for 60 doses 60 tablet 0    nabumetone (RELAFEN) 500 MG tablet Take 1 tablet (500 mg total) by mouth 2 (two) times daily with meals. 60 tablet 1    BIKTARVY -25 mg per tablet       ibuprofen (ADVIL,MOTRIN) 600 MG tablet Take 1 tablet (600 mg total) by mouth every 6 (six) hours as needed for Pain. (Patient not taking: Reported on 12/2/2019) 20 tablet 0    meloxicam (MOBIC) 15 MG tablet Take 1 tablet (15 mg total) by mouth once daily. (Patient not taking: Reported on 12/2/2019) 30 tablet 0     No current facility-administered medications for this visit.        Past Medical History:   Diagnosis Date    Anxiety     Concussion     Fatigue     Headache(784.0)     HIV (human immunodeficiency virus infection)     Immune deficiency disorder     PTSD (post-traumatic stress disorder)        No past surgical history on file.    OBJECTIVE:   PHYSICAL EXAM:  Height: 5' 6" (167.6 cm) Weight: 70.3 kg (155 lb)  Vitals:    12/02/19 1128   BP: (!) 144/81   Pulse: 94   Temp: 99.3 °F (37.4 °C)   TempSrc: Oral   Weight: 70.3 kg (155 lb)   Height: 5' 6" (1.676 m)   PainSc:   6   PainLoc: Wrist     Ortho/SPM Exam  Examination right hand wrist exam is about the same slight swelling range of motion wrist full but some pain with movement  Slightly decreased " sensation over the ulnar side of the wrist    RADIOGRAPHS:  None  Comments: I have personally reviewed the imaging and I agree with the above radiologist's report.    ASSESSMENT/PLAN:   1.  Right wrist pain.  2.  IMPRESSION:  Possible TFC tear    PLAN:  I would like him to loosen up the brace a little bit in case he is having some numbness from the brace  He can have the brace off at home  Restrictions of the same no lifting more than 10 lb  Follow-up after the MRI is complete    FOLLOW UP:  After the MRI is complete    Disclaimer: This note has been generated using voice-recognition software. There may be typographical errors that have been missed during proof-reading.

## 2019-12-06 ENCOUNTER — PATIENT MESSAGE (OUTPATIENT)
Dept: ORTHOPEDICS | Facility: CLINIC | Age: 29
End: 2019-12-06

## 2019-12-07 ENCOUNTER — HOSPITAL ENCOUNTER (OUTPATIENT)
Dept: RADIOLOGY | Facility: HOSPITAL | Age: 29
Discharge: HOME | End: 2019-12-07
Attending: ORTHOPAEDIC SURGERY
Payer: OTHER GOVERNMENT

## 2019-12-07 DIAGNOSIS — M25.531 RIGHT WRIST PAIN: ICD-10-CM

## 2019-12-07 PROCEDURE — 73221 MRI JOINT UPR EXTREM W/O DYE: CPT | Mod: 26,RT,, | Performed by: RADIOLOGY

## 2019-12-07 PROCEDURE — 73221 MRI JOINT UPR EXTREM W/O DYE: CPT | Mod: TC,RT

## 2019-12-07 PROCEDURE — 73221 MRI WRIST WITHOUT CONTRAST RIGHT: ICD-10-PCS | Mod: 26,RT,, | Performed by: RADIOLOGY

## 2019-12-09 ENCOUNTER — PATIENT MESSAGE (OUTPATIENT)
Dept: ORTHOPEDICS | Facility: CLINIC | Age: 29
End: 2019-12-09

## 2019-12-09 ENCOUNTER — CLINICAL SUPPORT (OUTPATIENT)
Dept: REHABILITATION | Facility: HOSPITAL | Age: 29
End: 2019-12-09
Payer: COMMERCIAL

## 2019-12-09 DIAGNOSIS — R29.898 IMPAIRED FLEXIBILITY OF LOWER EXTREMITY: ICD-10-CM

## 2019-12-09 DIAGNOSIS — M54.50 ACUTE LEFT-SIDED LOW BACK PAIN WITHOUT SCIATICA: ICD-10-CM

## 2019-12-09 DIAGNOSIS — M53.86 DECREASED RANGE OF MOTION OF LUMBAR SPINE: ICD-10-CM

## 2019-12-09 PROCEDURE — 97161 PT EVAL LOW COMPLEX 20 MIN: CPT | Mod: PN

## 2019-12-09 PROCEDURE — 97110 THERAPEUTIC EXERCISES: CPT | Mod: PN

## 2019-12-09 NOTE — PLAN OF CARE
"[facility]  Physical Therapy Initial Evaluation    Name: Gurpreet Youngblood  Clinic Number: [id number]    Therapy Diagnosis:   Encounter Diagnoses   Name Primary?    Decreased range of motion of lumbar spine     Acute left-sided low back pain without sciatica     Impaired flexibility of lower extremity      Physician: Lucia Arreola PA-C    Physician Orders: PT Eval and Treat  Medical Diagnosis from Referral: M54.5 (ICD-10-CM) - Acute bilateral low back pain without sciatica  Evaluation Date: 12/9/2019  Authorization Period Expiration: 12/31/2019  Plan of Care Expiration: 12/9/2019 to 1/24/2020  Visit # / Visits authorized: 1/38  FOTO: 1/10    Time In: 2:45 PM  Time Out: 3:30 PM  Total Billable Time: 45 minutes (Low Complexity Evaluation, 1 TE)    Precautions: Standard    Subjective     Date of onset: November 4, 2019    History of current condition - Gurpreet reports: that on November 4th he was sitting at work near the X-Ray machine and his chair back broke away from the base causing him to fall and land on the floor. He states that a metal piece that connects to the chair was landed in the middle of his back causing excruciating pain. He attempted to go back to work in late November on "light-duty" however it consisted of standing all day for 7.5 hours ultimately increasing his back pain. States he has pain medication but only takes it when the pain is intense because the medicine makes him extremely sleepy.      Medical History:   Past Medical History:   Diagnosis Date    Anxiety     Concussion     Fatigue     Headache(784.0)     HIV (human immunodeficiency virus infection)     Immune deficiency disorder     PTSD (post-traumatic stress disorder)        Surgical History:   Gurpreet Youngblood  has no past surgical history on file.    Medications:   Gurpreet has a current medication list which includes the following prescription(s): biktarvy, ibuprofen, meloxicam, " "methocarbamol, and nabumetone.    Allergies:   Review of patient's allergies indicates:  No Known Allergies     Imaging:   X-Ray Lumbar Spine A/P Lateral with Flex and Ext (11/19/2019): Four views: Alignment is normal. No fracture dislocation bone destruction or instability seen.    Prior Therapy: OT for wrist, PT for shoulder and upper back  Social History: Lives with their family; 1 story home 4-5 stairs to get in front door  Occupation: [employer] agent  Prior Level of Function: Independent  Current Level of Function: Unable to work, can still drive when wrist isn't hurting him    Pain:  Current 3/10, worst 7/10, best 3/10   Location: Left side of low back from mid-thoracic to iliac crest, occasionally radiates to right mid-thoracic spine  Description: Aching  Aggravating Factors: Sitting, Standing, Laying and any type of movement  Easing Factors: pain medication, however it makes him sleepy    Pts goals: "My back not to hurt and to be 100%."    Objective     Posture: Forward head and rounded shoulders  Palpation: Tender to palpation along right paraspinals and along medial portion of right scapula (patient states it is from previous therapy). Tender along left paraspinal muscles from mid-thoracic to iliac crest, tenderness also noted laterally into left side of low back from inferior scapula angle to iliac crest.   Sensation: Intact    Range of Motion/Strength:     Thoracolumbar AROM Pain/Dysfunction with Movement   Flexion 55    Extension 30* P! In left low back   Right side bending 25    Left side bending 22    Right rotation 35    Left rotation 40    *denotes pain with movement    Shoulder Right Left Pain/Dysfunction with Movement   AROM/PROM      flexion WFL WFL    extension WFL WFL    abduction WFL WFL    adduction WFL WFL    Internal rotation WFL WFL    ER at 90° abd WFL WFL      Elbow Right Left Pain/Dysfunction with Movement   AROM/PROM      flexion WFL WFL    extension WFL WFL      Hip Right Left " "Pain/Dysfunction with Movement   AROM/PROM      flexion WFL WFL    extension WFL WFL    abduction WFL WFL    adduction WFL WFL    Internal rotation Moderately limited Moderately limited    External rotation WFL WFL      Knee Right Left Pain/Dysfunction with Movement   AROM/PROM      flexion WFL WFL    extension WFL WFL      Ankle Right Left Pain/Dysfunction with Movement   AROM/PROM      dorsiflexion WFL WFL    plantarflexion WFL WFL    inversion WFL WFL    eversion WFL WFL        L/E MMT Right Left Pain/Dysfunction with Movement   Hip Flexion 5/5 5/5    Hip Extension / 5/5    Hip Abduction / 5/    Hip Adduction / 5/    Hip IR /    Hip ER  5/    Knee Flexion /    Knee Extension     Ankle DF     Ankle PF //    Ankle Inversion /    Ankle Eversion  5      Joint Mobility:   - Thoracic: decreased mobility in P/A direction throughout thoracic spine  - Lumbar: decreased mobility in sidebending and rotation bilaterally    Flexibility: Hamstring 90/90 bilaterally: Moderately limited    Gait Analysis:Without AD Assistance Independent Deviations: decreased swing phase bilaterally      CMS Impairment/Limitation/Restriction for FOTO Lumbar Spine Survey    Therapist reviewed FOTO scores for Gurpreet Youngblood on 2019.   FOTO documents entered into [employer] - see Media section.    Limitation Score: 58%  Category: Mobility    Current : CK = at least 40% but < 60% impaired, limited or restricted  Goal: CJ = at least 20% but < 40% impaired, limited or restricted         TREATMENT     Treatment Time In: 3:15 PM  Treatment Time Out: 3:30 PM  Total Treatment time separate from Evaluation: 15 minutes         - THERAPEUTIC EXERCISES to develop  strength and core stabilization for 15 minutes including :.  Review of proper sitting posture, prone press-up, standing extension, and cat-camel  - LTRs: 1x10, 5"  - TA Contractions: 1x10  - Brace Marchin' x 2  - Bridging: 1x10, 3" " holds    Home Exercises and Patient Education Provided    Education provided:   - Importance and role of physical therapy  - Proper body mechanics when performing HEP    Written Home Exercises Provided: yes.  Exercises were reviewed and Gurpreet was able to demonstrate them prior to the end of the session.  Gurpreet demonstrated good  understanding of the education provided.     See EMR under Patient Instructions for exercises provided 12/9/2019.    Assessment     Gurpreet is a 29 y.o. male referred to outpatient Physical Therapy with a medical diagnosis of acute bilateral low back pain without sciatica. Pt presents to PT with pain, decreased lumbar ROM, decreased strength and flexibility, poor posture, impaired balance and gait, and functional deficits with performing work duties, prolonged standing, prolonged sitting, and lying down in various positions. Patient also presented with brace on right wrist, injuring it from the same fall; may present some difficulties when performing certain exercises for low back pain.      Pt prognosis is Good.   Pt will benefit from skilled outpatient Physical Therapy to address the deficits stated above and in the chart below, provide pt/family education, and to maximize pt's level of independence.     Plan of care discussed with patient: Yes  Pt's spiritual, cultural and educational needs considered and pt agreeable to plan of care and goals as stated below:     Anticipated Barriers for therapy: Co-morbidities, pain in right wrist/brace on right wrist      Medical Necessity is demonstrated by the following  History  Co-morbidities and personal factors that may impact the plan of care Co-morbidities:   Hx of post-concussion syndrome, Meibomian gland dysfunction, Nightmares, decreased independence with activities    Personal Factors:   coping style     moderate   Examination  Body Structures and Functions, activity limitations and participation restrictions that may impact the plan of care  Body Regions:   back  lower extremities  upper extremities  trunk    Body Systems:    ROM  strength  balance  gait  transfers    Participation Restrictions:   Co-morbidities     Activity limitations:   Learning and applying knowledge  no deficits    General Tasks and Commands  no deficits    Communication  no deficits    Mobility  lifting and carrying objects  walking    Self care  no deficits    Domestic Life  doing house work (cleaning house, washing dishes, laundry)    Interactions/Relationships  no deficits    Life Areas  no deficits    Community and Social Life  community life  recreation and leisure         low   Clinical Presentation evolving clinical presentation with changing clinical characteristics low   Decision Making/ Complexity Score: low         Goals:    Short Term Goals (3 Weeks):  1. Pt will be compliant with HEP to supplement PT in decreasing pain with functional mobility.  2. Pt will report decreased pain with sitting at less than or equal to 3/10 to improve ability to return to work.   3. Pt will improve lumbar ROM to </=minimal loss in all planes to improve functional mobility.    Long Term Goals (6 Weeks):   1. Pt will improve FOTO score to </= 34% limited to decrease perceived limitation with maintaining/changing body position.   2. Pt will perform lumbar twisting motions with good control to demonstrate improved core strength.  3. Pt will report no pain with sitting to improve ability to return to work.  4. Pt will report no pain during lumbar ROM to promote functional mobility.  5. Pt will return to work reporting pain less than or equal to 1/10 at end of work shift.       Plan     Plan of care Certification: 12/9/2019 to 1/24/2020.    Outpatient Physical Therapy 2 times weekly for 6 weeks to include the following interventions: Cervical/Lumbar Traction, Gait Training, Manual Therapy, Moist Heat/ Ice, Neuromuscular Re-ed, Patient Education, Therapeutic Activites and Therapeutic Exercise.      Gloria Mayorga, PT, DPT

## 2019-12-09 NOTE — PATIENT INSTRUCTIONS
Posture - Sitting    Sit upright, head facing forward. Scoot your tailbone all the way to the back of your chair. Lean slightly forward and place a rolled up towel at your waist. Lean back so shoulder blades lightly touch the back of the chair. Keep shoulders relaxed, and avoid rounded back. Keep hips level with knees. Avoid crossing legs for long periods.         Prone Press-Up    Press upper body upward, keeping hips in contact with floor. Keep lower back and buttocks relaxed. Hold 1-2 seconds then slowly lower.  Repeat 10 times per set. Do 2 sets per session. Do 2 sessions per day.      Extension in Standing    Place hands on back of hips and lean back, pushing hips gently forward. Repeat slowly and continuously.  Repeat 10 times per set. Do 2 sets per session. Do 2 sessions per day.      Lumbar Rotation    Feet on floor, slowly rock knees from side to side in small, pain-free range of motion. Allow lower back to rotate slightly, but keep shoulders flat on ground. Hold 5 seconds.  Repeat 10 times per set. Do 2 sets per session. Do 2 sessions per day.        Cat-Camel Stretch    Tuck chin and tighten stomach, rounding your back. Then look up, let your belly drop, and arch your back.  Repeat 10 times per set. Do 2 sets per session. Do 2 sessions per day.       TA Contractions    Flatten back by tightening stomach muscles and buttocks, the same feeling you get when laugh, cough or sneeze. Do NOT hold your breath.  Hold 5 seconds.  Repeat 10 times per set. Do 2 sets per session. Do 2 sessions per day.      Bridging    Flatten back against floor then slowly raise buttocks from floor, keeping stomach tight. Hold 5 seconds.  Repeat 10 times per set. Do 2 sets per session. Do 2 sessions per day.      Bent Leg Lift (Hook-Lying)    Tighten stomach and slowly raise right leg from floor. Keep trunk rigid. Slowly lower and switch sides.  Repeat 1 minute, 2 times per set. Do 2 sets per session. Do 2 sessions per  day.      Copyright © I. All rights reserved.

## 2019-12-11 PROBLEM — M54.50 ACUTE LEFT-SIDED LOW BACK PAIN WITHOUT SCIATICA: Status: ACTIVE | Noted: 2019-12-11

## 2019-12-11 PROBLEM — M53.86 DECREASED RANGE OF MOTION OF LUMBAR SPINE: Status: ACTIVE | Noted: 2019-12-11

## 2019-12-11 PROBLEM — R29.898 IMPAIRED FLEXIBILITY OF LOWER EXTREMITY: Status: ACTIVE | Noted: 2019-12-11

## 2019-12-16 ENCOUNTER — PATIENT MESSAGE (OUTPATIENT)
Dept: ORTHOPEDICS | Facility: CLINIC | Age: 29
End: 2019-12-16

## 2019-12-16 ENCOUNTER — OFFICE VISIT (OUTPATIENT)
Dept: ORTHOPEDICS | Facility: CLINIC | Age: 29
End: 2019-12-16
Payer: COMMERCIAL

## 2019-12-16 ENCOUNTER — TELEPHONE (OUTPATIENT)
Dept: URGENT CARE | Facility: CLINIC | Age: 29
End: 2019-12-16

## 2019-12-16 VITALS — BODY MASS INDEX: 24.91 KG/M2 | WEIGHT: 155 LBS | HEIGHT: 66 IN

## 2019-12-16 DIAGNOSIS — M25.531 RIGHT WRIST PAIN: ICD-10-CM

## 2019-12-16 DIAGNOSIS — S63.501D SPRAIN OF RIGHT WRIST, SUBSEQUENT ENCOUNTER: Primary | ICD-10-CM

## 2019-12-16 PROCEDURE — 99999 PR PBB SHADOW E&M-EST. PATIENT-LVL III: CPT | Mod: PBBFAC,,, | Performed by: ORTHOPAEDIC SURGERY

## 2019-12-16 PROCEDURE — 99213 PR OFFICE/OUTPT VISIT, EST, LEVL III, 20-29 MIN: ICD-10-PCS | Mod: S$GLB,,, | Performed by: ORTHOPAEDIC SURGERY

## 2019-12-16 PROCEDURE — 99999 PR PBB SHADOW E&M-EST. PATIENT-LVL III: ICD-10-PCS | Mod: PBBFAC,,, | Performed by: ORTHOPAEDIC SURGERY

## 2019-12-16 PROCEDURE — 99213 OFFICE O/P EST LOW 20 MIN: CPT | Mod: S$GLB,,, | Performed by: ORTHOPAEDIC SURGERY

## 2019-12-16 NOTE — TELEPHONE ENCOUNTER
"Spoke with patient regarding claim status. Checking with the Dept of Labor online, his claim remains "Under Development".     I discussed with the patient that this claim is not billable to the [employer] as a work comp case at this time until it is accepted.    Until then, he will have to continue to use his personal health care coverage to get this injury treated, until it is  covered by the [employer].    "

## 2019-12-16 NOTE — PROGRESS NOTES
"Subjective:      Patient ID: Gurpreet Youngblood is a 29 y.o. male.  Chief Complaint: Pain and Results of the Right Wrist      HPI  Gurpreet Youngblood is a  29 y.o. male presenting today for follow up of right wrist injury.  He reports that he is doing a little bit better in terms of the right hand  The numbness seems better since he loosened up the brace  He also recently had an MRI scan of the right wrist.  The results of the MRI showed no tear of the ligaments or cartilage in the wrist and went over that with him today  He does have evidence of a bone bruise involving the distal radius and capitate and I think this is related to the previous injury  Courses does not require any surgery and should improve with time.    Review of patient's allergies indicates:  No Known Allergies      Current Outpatient Medications   Medication Sig Dispense Refill    nabumetone (RELAFEN) 500 MG tablet Take 1 tablet (500 mg total) by mouth 2 (two) times daily with meals. 60 tablet 1    BIKTARVY -25 mg per tablet       ibuprofen (ADVIL,MOTRIN) 600 MG tablet Take 1 tablet (600 mg total) by mouth every 6 (six) hours as needed for Pain. (Patient not taking: Reported on 12/2/2019) 20 tablet 0    meloxicam (MOBIC) 15 MG tablet Take 1 tablet (15 mg total) by mouth once daily. (Patient not taking: Reported on 12/2/2019) 30 tablet 0     No current facility-administered medications for this visit.        Past Medical History:   Diagnosis Date    Anxiety     Concussion     Fatigue     Headache(784.0)     HIV (human immunodeficiency virus infection)     Immune deficiency disorder     PTSD (post-traumatic stress disorder)        No past surgical history on file.    OBJECTIVE:   PHYSICAL EXAM:  Height: 5' 6" (167.6 cm) Weight: 70.3 kg (155 lb)  Vitals:    12/16/19 1559 12/16/19 1600   Weight: 70.3 kg (155 lb)    Height: 5' 6" (1.676 m)    PainSc:   4   4     Ortho/SPM Exam  Examination right hand and wrist demonstrates some " mild diffuse tenderness no significant swelling range of motion almost full   strength decreased no instability    RADIOGRAPHS:  MRI report is noted above  Comments: I have personally reviewed the imaging and I agree with the above radiologist's report.    ASSESSMENT/PLAN:     IMPRESSION:  Bone bruise/sprain right wrist    PLAN:  I have ordered some OT for the right hand and wrist work on range of motion and strengthening  I also would like him to start weaning out of the wrist brace  He can return to light duty work no lifting more than 10 lb and advance a little bit each week  Follow-up 3-4 weeks    FOLLOW UP:  3-4 weeks    Disclaimer: This note has been generated using voice-recognition software. There may be typographical errors that have been missed during proof-reading.

## 2019-12-17 ENCOUNTER — PATIENT MESSAGE (OUTPATIENT)
Dept: ORTHOPEDICS | Facility: CLINIC | Age: 29
End: 2019-12-17

## 2019-12-17 ENCOUNTER — HOSPITAL ENCOUNTER (OUTPATIENT)
Dept: RADIOLOGY | Facility: HOSPITAL | Age: 29
Discharge: HOME | End: 2019-12-17
Attending: PHYSICIAN ASSISTANT
Payer: OTHER GOVERNMENT

## 2019-12-17 ENCOUNTER — OFFICE VISIT (OUTPATIENT)
Dept: SPINE | Facility: CLINIC | Age: 29
End: 2019-12-17
Attending: ANESTHESIOLOGY
Payer: COMMERCIAL

## 2019-12-17 VITALS
WEIGHT: 154.13 LBS | TEMPERATURE: 98 F | HEIGHT: 66 IN | HEART RATE: 93 BPM | BODY MASS INDEX: 24.77 KG/M2 | SYSTOLIC BLOOD PRESSURE: 167 MMHG | DIASTOLIC BLOOD PRESSURE: 95 MMHG

## 2019-12-17 DIAGNOSIS — M53.3 SACROILIAC JOINT PAIN: ICD-10-CM

## 2019-12-17 DIAGNOSIS — M54.50 ACUTE LEFT-SIDED LOW BACK PAIN WITHOUT SCIATICA: Primary | ICD-10-CM

## 2019-12-17 DIAGNOSIS — M54.50 ACUTE BILATERAL LOW BACK PAIN WITHOUT SCIATICA: ICD-10-CM

## 2019-12-17 DIAGNOSIS — M54.50 ACUTE BILATERAL LOW BACK PAIN WITHOUT SCIATICA: Primary | ICD-10-CM

## 2019-12-17 PROCEDURE — 72148 MRI LUMBAR SPINE W/O DYE: CPT | Mod: TC

## 2019-12-17 PROCEDURE — 99999 PR PBB SHADOW E&M-EST. PATIENT-LVL III: CPT | Mod: PBBFAC,,, | Performed by: ANESTHESIOLOGY

## 2019-12-17 PROCEDURE — 99999 PR PBB SHADOW E&M-EST. PATIENT-LVL III: ICD-10-PCS | Mod: PBBFAC,,, | Performed by: ANESTHESIOLOGY

## 2019-12-17 PROCEDURE — 20552 NJX 1/MLT TRIGGER POINT 1/2: CPT | Mod: S$GLB,,, | Performed by: ANESTHESIOLOGY

## 2019-12-17 PROCEDURE — 20552 PR INJECT TRIGGER POINT, 1 OR 2: ICD-10-PCS | Mod: S$GLB,,, | Performed by: ANESTHESIOLOGY

## 2019-12-17 PROCEDURE — 72148 MRI LUMBAR SPINE WITHOUT CONTRAST: ICD-10-PCS | Mod: 26,,, | Performed by: RADIOLOGY

## 2019-12-17 PROCEDURE — 99244 OFF/OP CNSLTJ NEW/EST MOD 40: CPT | Mod: 25,S$GLB,, | Performed by: ANESTHESIOLOGY

## 2019-12-17 PROCEDURE — 99244 PR OFFICE CONSULTATION,LEVEL IV: ICD-10-PCS | Mod: 25,S$GLB,, | Performed by: ANESTHESIOLOGY

## 2019-12-17 PROCEDURE — 72148 MRI LUMBAR SPINE W/O DYE: CPT | Mod: 26,,, | Performed by: RADIOLOGY

## 2019-12-17 RX ORDER — METHYLPREDNISOLONE 4 MG/1
TABLET ORAL
Qty: 1 PACKAGE | Refills: 0 | Status: SHIPPED | OUTPATIENT
Start: 2019-12-17 | End: 2020-01-07

## 2019-12-17 NOTE — LETTER
December 17, 2019      Lucia Arreola PA-C  [address]  [facility] [address]           [address]  [address], [address]  [facility] [address]  Phone: [phone]  Fax: [phone]          Patient: Gurpreet Youngblood   MR Number: [id number]   YOB: 1990   Date of Visit: 12/17/2019       Dear Lucia Arreola:    Thank you for referring Gurpreet Youngblood to me for evaluation. Attached you will find relevant portions of my assessment and plan of care.    If you have questions, please do not hesitate to call me. I look forward to following Gurpreet Youngblood along with you.    Sincerely,    Royce Barkley MD    Enclosure  CC:  No Recipients    If you would like to receive this communication electronically, please contact [email or web address] or [phone] to request more information on [employer] Link access.    For providers and/or their staff who would like to refer a patient to [facility], please contact us through our one-stop-shop provider referral line, [facility], at [phone].    If you feel you have received this communication in error or would no longer like to receive these types of communications, please e-mail [email or web address]

## 2019-12-17 NOTE — PROGRESS NOTES
Chronic Pain - New Consult    Referring Physician: Lucia Arreola PA-C    Chief Complaint:   Chief Complaint   Patient presents with    Low-back Pain        SUBJECTIVE: Disclaimer: This note has been generated using voice-recognition software. There may be typographical errors that have been missed during proof-reading    Initial encounter:    Gurpreet Youngblood presents to the clinic for the evaluation of lower back pain. The pain started after a work related fall onto his back and symptoms have been unchanged.    Brief history:    Pain Description:    The pain is located in the lower back on the left without radiation.      At BEST  7/10     At WORST  9/10 on the WORST day.      On average pain is rated as 8/10.     Today the pain is rated as 8/10    The pain is described as burning, sharp, shooting, throbbing and tingling      Symptoms interfere with daily activity, sleeping and work.     Exacerbating factors: Sitting, Standing, Bending, Walking, Morning, Lifting and Getting out of bed/chair.      Mitigating factors rest.     Patient denies urinary incontinence and bowel incontinence.  Patient denies any suicidal or homicidal ideations    Pain Medications:  Current:  meloxicam  nabumetone    Tried in Past:  NSAIDs -yes without substantial relief  TCA -Never  SNRI -Never  Anti-convulsants -Never  Muscle Relaxants -flexeril, robaxin - no relief  Opioids-Never    Physical Therapy/Home Exercise: no       report:  Reviewed and consistent with medication use as prescribed.    Pain Procedures: none    Chiropractor -never  Acupuncture - never  TENS unit -never  Spinal decompression -never  Joint replacement -never    Imaging:  Xray lumbar spine flex/ext 11/19/2019  EXAMINATION:  XR LUMBAR SPINE AP AND LAT WITH FLEX/EXT    CLINICAL HISTORY:  Other intervertebral disc degeneration, lumbar region    FINDINGS:  Four views: Alignment is normal.  No fracture dislocation bone destruction or instability seen.       Impression       No acute process seen.     MRI cervical spine 3/29/2019  EXAMINATION:  MRI CERVICAL SPINE WITHOUT CONTRAST    CLINICAL HISTORY:  cervical radiculopathy; Radiculopathy, cervical region    TECHNIQUE:  Multiplanar, multisequence MR images of the cervical spine were performed without the administration of contrast.    COMPARISON:  None.    FINDINGS:  Alignment: Normal.    Vertebrae: Normal marrow signal. No fracture.    Discs: Intervertebral disc heights are maintained.  There is very mild uncovertebral hypertrophy at C3-C4.  No significant canal stenosis or neural foraminal narrowing at any level.    Cord: Normal.    Skull base and craniocervical junction: Normal.    Paraspinal muscles & soft tissues: Unremarkable.      Impression       No evidence of neural foraminal compression or canal stenosis.      Electronically signed by: Bhanu Pérez MD  Date: 03/29/2019  Time: 13:59            Past Medical History:   Diagnosis Date    Anxiety     Concussion     Fatigue     Headache(784.0)     HIV (human immunodeficiency virus infection)     Immune deficiency disorder     PTSD (post-traumatic stress disorder)      No past surgical history on file.  Social History     Socioeconomic History    Marital status: Single     Spouse name: Not on file    Number of children: 0    Years of education: Not on file    Highest education level: Not on file   Occupational History    Not on file   Social Needs    Financial resource strain: Not on file    Food insecurity:     Worry: Not on file     Inability: Not on file    Transportation needs:     Medical: Not on file     Non-medical: Not on file   Tobacco Use    Smoking status: Never Smoker    Smokeless tobacco: Never Used   Substance and Sexual Activity    Alcohol use: No     Alcohol/week: 0.0 standard drinks    Drug use: No    Sexual activity: Yes     Partners: Female   Lifestyle    Physical activity:     Days per week: Not on file     Minutes per  session: Not on file    Stress: Not on file   Relationships    Social connections:     Talks on phone: Not on file     Gets together: Not on file     Attends Zoroastrianism service: Not on file     Active member of club or organization: Not on file     Attends meetings of clubs or organizations: Not on file     Relationship status: Not on file   Other Topics Concern    Patient feels they ought to cut down on drinking/drug use Not Asked    Patient annoyed by others criticizing their drinking/drug use Not Asked    Patient has felt bad or guilty about drinking/drug use Not Asked    Patient has had a drink/used drugs as an eye opener in the AM Not Asked   Social History Narrative    Not on file     Family History   Problem Relation Age of Onset    Stroke Mother     Hypertension Mother     No Known Problems Sister     No Known Problems Brother     Stroke Maternal Aunt     Cancer Maternal Grandmother     Cancer Maternal Grandfather     Melanoma Neg Hx     Psoriasis Neg Hx     Lupus Neg Hx        Review of patient's allergies indicates:  No Known Allergies    Current Outpatient Medications   Medication Sig    ibuprofen (ADVIL,MOTRIN) 600 MG tablet Take 1 tablet (600 mg total) by mouth every 6 (six) hours as needed for Pain.    meloxicam (MOBIC) 15 MG tablet Take 1 tablet (15 mg total) by mouth once daily.    nabumetone (RELAFEN) 500 MG tablet Take 1 tablet (500 mg total) by mouth 2 (two) times daily with meals.     No current facility-administered medications for this visit.        REVIEW OF SYSTEMS:    GENERAL:  No weight loss, malaise or fevers.  HEENT:   No recent changes in vision or hearing  NECK:  Negative for lumps, no difficulty with swallowing.  RESPIRATORY:  Negative for cough, wheezing or shortness of breath, patient denies any recent URI.  CARDIOVASCULAR:  Negative for chest pain, leg swelling or palpitations.  GI:  Negative for abdominal discomfort, blood in stools or black stools or change in  "bowel habits.  MUSCULOSKELETAL:  See HPI.  SKIN:  Negative for lesions, rash, and itching.  PSYCH:  No mood disorder or recent psychosocial stressors.  Patients sleep is disturbed secondary to pain.  HEMATOLOGY/LYMPHOLOGY:  Negative for prolonged bleeding, bruising easily or swollen nodes.  Patient is not currently taking any anti-coagulants  ENDO: No history of diabetes or thyroid dysfunction  NEURO:   No history of headaches, syncope, paralysis, seizures or tremors.  All other reviewed and negative other than HPI.    OBJECTIVE:    BP (!) 167/95   Pulse 93   Temp 98.2 °F (36.8 °C) (Oral)   Ht 5' 6" (1.676 m)   Wt 69.9 kg (154 lb 1.6 oz)   BMI 24.87 kg/m²     PHYSICAL EXAMINATION:    GENERAL: Well appearing, in no acute distress, alert and oriented x3.  PSYCH:  Mood and affect appropriate.  SKIN: Skin color, texture, turgor normal, no rashes or lesions.  HEAD/FACE:  Normocephalic, atraumatic. Cranial nerves grossly intact.  CV: RRR with palpation of the radial artery.  PULM: No evidence of respiratory difficulty, symmetric chest rise.  BACK: Straight leg raising in the sitting and supine positions is negative to radicular pain. There is pain with palpation over the facet joints of the lumbar spine bilaterally. There is decreased range of motion with extension to 15 degrees, and facet loading maneuvers cause reproducible pain.    EXTREMITIES: Peripheral joint ROM is full and pain free without obvious instability or laxity in all four extremities. No deformities, edema, or skin discoloration. Good capillary refill.  MUSCULOSKELETAL: Hip, and knee provocative maneuvers are negative.  There is  pain with palpation over the sacroiliac joint on the left, negative on the right.  There is no pain to palpation over the greater trochanteric bursa bilaterally.  FABERs test is positive on the left.  FADIRs test is negative.   Bilateral lower extremity strength is normal and symmetric.  No atrophy or tone abnormalities are " noted.  NEURO: Bilateral lower extremity coordination and muscle stretch reflexes are physiologic and symmetric.  Plantar response are downgoing. No clonus.  No loss of sensation is noted.  GAIT: Antalgic, ambulates without assistance          Lab Results   Component Value Date    WBC 4.61 07/28/2019    HGB 13.1 (L) 07/28/2019    HCT 38.2 (L) 07/28/2019    MCV 87 07/28/2019     07/28/2019       BMP  Lab Results   Component Value Date     07/28/2019    K 3.4 (L) 07/28/2019     07/28/2019    CO2 27 07/28/2019    BUN 8 07/28/2019    CREATININE 1.0 07/28/2019    CALCIUM 9.6 07/28/2019    ANIONGAP 11 07/28/2019    ESTGFRAFRICA >60 07/28/2019    EGFRNONAA >60 07/28/2019         ASSESSMENT: 29 y.o. year old male with pain, consistent with     Encounter Diagnoses   Name Primary?    Acute left-sided low back pain without sciatica Yes    Sacroiliac joint pain        PLAN:   Toradol injection 60 mg over the area of the left sacroiliac joint today    Start Medrol Dosepak    Scheduled MRI also add x-rays of the hips and pelvis bilaterally.    Discontinue use of Relafen and meloxicam and ibuprofen at this time.    Follow-up in 2 weeks for review of imaging and response to medication.    In the future the patient may benefit from sacroiliac joint injection      The above plan and management options were discussed at length with patient. Patient is in agreement with the above and verbalized understanding. It will be communicated with the referring physician via electronic record, fax, or mail.    Royce Barkley  12/17/2019

## 2019-12-18 ENCOUNTER — PATIENT MESSAGE (OUTPATIENT)
Dept: SPINE | Facility: CLINIC | Age: 29
End: 2019-12-18

## 2019-12-18 ENCOUNTER — CLINICAL SUPPORT (OUTPATIENT)
Dept: REHABILITATION | Facility: HOSPITAL | Age: 29
End: 2019-12-18
Payer: COMMERCIAL

## 2019-12-18 ENCOUNTER — PATIENT MESSAGE (OUTPATIENT)
Dept: ORTHOPEDICS | Facility: CLINIC | Age: 29
End: 2019-12-18

## 2019-12-18 ENCOUNTER — TELEPHONE (OUTPATIENT)
Dept: ORTHOPEDICS | Facility: CLINIC | Age: 29
End: 2019-12-18

## 2019-12-18 DIAGNOSIS — R29.898 IMPAIRED FLEXIBILITY OF LOWER EXTREMITY: ICD-10-CM

## 2019-12-18 DIAGNOSIS — M53.86 DECREASED RANGE OF MOTION OF LUMBAR SPINE: ICD-10-CM

## 2019-12-18 DIAGNOSIS — M54.50 ACUTE LEFT-SIDED LOW BACK PAIN WITHOUT SCIATICA: ICD-10-CM

## 2019-12-18 PROCEDURE — 97110 THERAPEUTIC EXERCISES: CPT | Mod: PN | Performed by: PHYSICAL THERAPIST

## 2019-12-18 NOTE — TELEPHONE ENCOUNTER
----- Message from Anne Perez sent at 12/18/2019  3:00 PM CST -----  Contact: patient   Patient is requesting a call back in regards to a document he is requesting for workmen's comp.    Please call back at [phone] to discuss today.

## 2019-12-18 NOTE — TELEPHONE ENCOUNTER
Spoke with patient. Informed him that I left message with Joe, person handling claim.  I will try calling again tomorrow to get clarification on what is needed.

## 2019-12-18 NOTE — PROGRESS NOTES
"  Physical Therapy Daily Treatment Note     [facility]  [facility] Number: [id number]    Therapy Diagnosis:   Encounter Diagnoses   Name Primary?    Decreased range of motion of lumbar spine     Acute left-sided low back pain without sciatica     Impaired flexibility of lower extremity      Physician: Lucia Arreola PA-C    Visit Date: 2019    Physician Orders: PT Eval and Treat  Medical Diagnosis from Referral: M54.5 (ICD-10-CM) - Acute bilateral low back pain without sciatica  Evaluation Date: 2019  Authorization Period Expiration: 2019  Plan of Care Expiration: 2019 to 2020  Visit # / Visits authorized:   FOTO: 2/10     Time In: 1:00 PM  Time Out: 1:45 PM  Total Billable Time: 45 minutes     Precautions: Standard    Subjective     Pt reports: no significant changes, still having pain and stiffness in low back. .  He was compliant with home exercise program.  Response to previous treatment: evaluation  Functional change: evaluation    Pain: 6/10  Location: bilateral trunk     Objective     Gurpreet received therapeutic exercises to develop strength, endurance and core stabilization for 45 minutes including:  - LTRs: 2x10, 5"  - TA Contractions: 3x10  - Brace Marchin' x 4  - Bridging: 3x10, 3" holds  - Prone Press ups: 3x10  - Standing extension 3x10  - Cat-Camel 20x        Home Exercises Provided and Patient Education Provided     Education provided:   - Continue HEP at home  - Home modalities prn    Written Home Exercises Provided: yes.  Exercises were reviewed and Gurpreet was able to demonstrate them prior to the end of the session.  Gurpreet demonstrated good  understanding of the education provided.     See EMR under Patient Instructions for exercises provided on evaluation.      Assessment     Pt demonstrated therex well and reports decreased stiffness in low back. Pt will continue HEP and next visit core stability therex will be progressed.   Gurpreet is progressing well " towards his goals.   Pt prognosis is Good.     Pt will continue to benefit from skilled outpatient physical therapy to address the deficits listed in the problem list box on initial evaluation, provide pt/family education and to maximize pt's level of independence in the home and community environment.     Pt's spiritual, cultural and educational needs considered and pt agreeable to plan of care and goals.    Anticipated barriers to physical therapy: Co-morbidities, pain in right wrist/brace on right wrist    Goals:   Short Term Goals (3 Weeks):  1. Pt will be compliant with HEP to supplement PT in decreasing pain with functional mobility. (Progressing, not met)  2. Pt will report decreased pain with sitting at less than or equal to 3/10 to improve ability to return to work. (Progressing, not met)  3. Pt will improve lumbar ROM to </=minimal loss in all planes to improve functional mobility. (Progressing, not met)     Long Term Goals (6 Weeks):   1. Pt will improve FOTO score to </= 34% limited to decrease perceived limitation with maintaining/changing body position. (Progressing, not met)  2. Pt will perform lumbar twisting motions with good control to demonstrate improved core strength. (Progressing, not met)  3. Pt will report no pain with sitting to improve ability to return to work. (Progressing, not met)  4. Pt will report no pain during lumbar ROM to promote functional mobility. (Progressing, not met)  5. Pt will return to work reporting pain less than or equal to 1/10 at end of work shift. (Progressing, not met)    Plan     Continue POC to advance toward functional goals    Tej Subramanian, PT

## 2019-12-18 NOTE — TELEPHONE ENCOUNTER
Patient stated that an excuse for his job was discussed at his visit yesterday and would like for the provider to state an estimated time to return to full duty and diagnosis.

## 2019-12-19 ENCOUNTER — PATIENT MESSAGE (OUTPATIENT)
Dept: SPINE | Facility: CLINIC | Age: 29
End: 2019-12-19

## 2019-12-19 NOTE — TELEPHONE ENCOUNTER
If you need a doctor excuse for your appt you had scheduled 12/17/2019 office will  provide you with that, but if you need anything beyond that those days weren't ok by the doctor.

## 2019-12-20 ENCOUNTER — PATIENT MESSAGE (OUTPATIENT)
Dept: ORTHOPEDICS | Facility: CLINIC | Age: 29
End: 2019-12-20

## 2019-12-20 ENCOUNTER — TELEPHONE (OUTPATIENT)
Dept: ORTHOPEDICS | Facility: CLINIC | Age: 29
End: 2019-12-20

## 2019-12-20 NOTE — TELEPHONE ENCOUNTER
----- Message from Lex Gonzalez sent at 12/20/2019 10:09 AM CST -----  Contact: Effie Byrd -PCP   Request clinical notes from 12/16 please      Contact

## 2019-12-23 ENCOUNTER — DOCUMENTATION ONLY (OUTPATIENT)
Dept: REHABILITATION | Facility: HOSPITAL | Age: 29
End: 2019-12-23

## 2019-12-23 DIAGNOSIS — M53.86 DECREASED RANGE OF MOTION OF LUMBAR SPINE: ICD-10-CM

## 2019-12-23 DIAGNOSIS — M54.50 ACUTE LEFT-SIDED LOW BACK PAIN WITHOUT SCIATICA: ICD-10-CM

## 2019-12-23 DIAGNOSIS — R29.898 IMPAIRED FLEXIBILITY OF LOWER EXTREMITY: ICD-10-CM

## 2019-12-23 NOTE — PROGRESS NOTES
Face to Face PTA Conference performed with the following regarding patient's current status, overall progress, and plan of care:  Carly Leon PTA, Hanane Glynn PTA, Adelfo Lu PTA and Jose Allen PTA     Gloria Mayorga, PT, DPT    Hanane Glynn, PTA    Carly Leon, PTA    Jose Allen, PTA     Adelfo Lu, PTA

## 2020-01-06 ENCOUNTER — CLINICAL SUPPORT (OUTPATIENT)
Dept: REHABILITATION | Facility: HOSPITAL | Age: 30
End: 2020-01-06
Payer: COMMERCIAL

## 2020-01-06 DIAGNOSIS — M54.50 ACUTE LEFT-SIDED LOW BACK PAIN WITHOUT SCIATICA: ICD-10-CM

## 2020-01-06 DIAGNOSIS — M53.86 DECREASED RANGE OF MOTION OF LUMBAR SPINE: ICD-10-CM

## 2020-01-06 DIAGNOSIS — R29.898 IMPAIRED FLEXIBILITY OF LOWER EXTREMITY: ICD-10-CM

## 2020-01-06 PROCEDURE — 97110 THERAPEUTIC EXERCISES: CPT | Mod: PN

## 2020-01-06 NOTE — PROGRESS NOTES
"  Physical Therapy Daily Treatment Note     Name: Gurpreet Youngblood  Clinic Number: [id number]    Therapy Diagnosis:   Encounter Diagnoses   Name Primary?    Decreased range of motion of lumbar spine     Acute left-sided low back pain without sciatica     Impaired flexibility of lower extremity      Physician: Lucia Arreola PA-C    Visit Date: 2020    Physician Orders: PT Eval and Treat  Medical Diagnosis from Referral: M54.5 (ICD-10-CM) - Acute bilateral low back pain without sciatica  Evaluation Date: 2019  Authorization Period Expiration: 2019  Plan of Care Expiration: 2019 to 2020  Visit # / Visits authorized: 3/38  FOTO: 3/10  PTA visit:      Time In: 1105  Time Out: 1155  Total Billable Time: 30 minutes (2 TE)     Precautions: Standard    Subjective     Pt reports: he is feeling a little better.  Still not back to work yet  He was not compliant with home exercise program.  Response to previous treatment: no adverse reaction  Functional change: none    Pain: 3/10  Location: left low back      Objective       Gurpreet received therapeutic exercises to develop strength, flexibility and core stabilization for 50 minutes including:    - supine HSS w/strap 3x30" B  - LTRs: 2x10, 5"  - TA Contractions: 3x10  - Brace Marchin' x 4  - Bridging: 3x10, 3" holds  - Prone Press ups: 3x10  - Standing extension 3x10  - Cat-Camel 20x    Home Exercises Provided and Patient Education Provided     Education provided:   - stressed importance of performing HEP regularly to maximize therapy benefits    Written Home Exercises Provided: Patient instructed to cont prior HEP.  Exercises were reviewed and Gurpreet was able to demonstrate them prior to the end of the session.  Gurpreet demonstrated good  understanding of the education provided.     See EMR under Patient Instructions for exercises provided at initial evaluation.      Assessment     Pt tolerates therapy activities without difficulties or c/o " increased pain.  Relays decrease in pain level upon completion of therapy session that rates at 2/10  Gurpreet is progressing well towards his goals.   Pt prognosis is Excellent.     Pt will continue to benefit from skilled outpatient physical therapy to address the deficits listed in the problem list box on initial evaluation, provide pt/family education and to maximize pt's level of independence in the home and community environment.     Pt's spiritual, cultural and educational needs considered and pt agreeable to plan of care and goals.    Anticipated barriers to physical therapy: Co-morbidities, pain in right wrist/brace on right wrist    Goals:   Short Term Goals (3 Weeks):  1. Pt will be compliant with HEP to supplement PT in decreasing pain with functional mobility. (Progressing, not met)  2. Pt will report decreased pain with sitting at less than or equal to 3/10 to improve ability to return to work. (Progressing, not met)  3. Pt will improve lumbar ROM to </=minimal loss in all planes to improve functional mobility. (Progressing, not met)     Long Term Goals (6 Weeks):   1. Pt will improve FOTO score to </= 34% limited to decrease perceived limitation with maintaining/changing body position. (Progressing, not met)  2. Pt will perform lumbar twisting motions with good control to demonstrate improved core strength. (Progressing, not met)  3. Pt will report no pain with sitting to improve ability to return to work. (Progressing, not met)  4. Pt will report no pain during lumbar ROM to promote functional mobility. (Progressing, not met)  5. Pt will return to work reporting pain less than or equal to 1/10 at end of work shift. (Progressing, not met)    Plan     Cont POC to progress towards established goals    Hanane Glynn, RIANNA

## 2020-01-08 ENCOUNTER — CLINICAL SUPPORT (OUTPATIENT)
Dept: REHABILITATION | Facility: HOSPITAL | Age: 30
End: 2020-01-08
Payer: COMMERCIAL

## 2020-01-08 DIAGNOSIS — M54.50 ACUTE LEFT-SIDED LOW BACK PAIN WITHOUT SCIATICA: ICD-10-CM

## 2020-01-08 DIAGNOSIS — M53.86 DECREASED RANGE OF MOTION OF LUMBAR SPINE: ICD-10-CM

## 2020-01-08 DIAGNOSIS — R29.898 IMPAIRED FLEXIBILITY OF LOWER EXTREMITY: ICD-10-CM

## 2020-01-08 PROCEDURE — 97110 THERAPEUTIC EXERCISES: CPT | Mod: PN,CQ

## 2020-01-08 NOTE — PROGRESS NOTES
"  Physical Therapy Daily Treatment Note     [facility]  [facility] [id number]    Therapy Diagnosis:   Encounter Diagnoses   Name Primary?    Decreased range of motion of lumbar spine     Acute left-sided low back pain without sciatica     Impaired flexibility of lower extremity      Physician: Lucia Arreola PA-C    Visit Date: 2020    Physician Orders: PT Eval and Treat  Medical Diagnosis from Referral: M54.5 (ICD-10-CM) - Acute bilateral low back pain without sciatica  Evaluation Date: 2019  Authorization Period Expiration: 2019  Plan of Care Expiration: 2019 to 2020  Visit # / Visits authorized:   FOTO: 4/10  NEXT  PTA visit:      Time In: 1300  Time Out: 1355  Total Billable Time: 55 minutes (4 TE)     Precautions: Standard    Subjective     Pt reports: he is feeling a little better.  Still not back to work yet, hasn't been active much at home. States he sees his MD on Friday.  He was compliant with home exercise program.  Response to previous treatment: no adverse reaction  Functional change: none    Pain: 0/10  Location: left low back      Objective     Gurpreet received therapeutic exercises to develop strength, flexibility and core stabilization for 55 minutes including:    - supine HSS w/strap 3x30" B  - LTRs:  5" x 2'  - TA Contractions: 5" hold 2x10  - Brace Marchin' x 3  - Brace March with alt UE lifts:  2x10  - Bridging: 3x10, 3" holds  - Prone Press ups: 3x10  - Standing extension 3x10  - Cat-Camel 20x  - Leg Press:  4.0 3x10 DL  - NuStep - NEXT?    Home Exercises Provided and Patient Education Provided     Education provided:   - stressed importance of performing HEP regularly to maximize therapy benefits  - importance of beginning to increase activity    Written Home Exercises Provided: Patient instructed to cont prior HEP.  Exercises were reviewed and Gurpreet was able to demonstrate them prior to the end of the session.  Gurpreet demonstrated good  " "understanding of the education provided.     See EMR under Patient Instructions for exercises provided on 12/09/2019      Assessment     Pt tolerates therapy activities including expanded core stabilization and trial on Leg Press without complaint of difficulties or c/o increased pain.  States "good - no pain" after treatment.   Gurpreet is progressing well towards his goals.   Pt prognosis is Excellent.     Pt will continue to benefit from skilled outpatient physical therapy to address the deficits listed in the problem list box on initial evaluation, provide pt/family education and to maximize pt's level of independence in the home and community environment.     Pt's spiritual, cultural and educational needs considered and pt agreeable to plan of care and goals.    Anticipated barriers to physical therapy: Co-morbidities, pain in right wrist/brace on right wrist    Goals:   Short Term Goals (3 Weeks):  1. Pt will be compliant with HEP to supplement PT in decreasing pain with functional mobility. (Progressing, not met)  2. Pt will report decreased pain with sitting at less than or equal to 3/10 to improve ability to return to work. (Progressing, not met)  3. Pt will improve lumbar ROM to </=minimal loss in all planes to improve functional mobility. (Progressing, not met)     Long Term Goals (6 Weeks):   1. Pt will improve FOTO score to </= 34% limited to decrease perceived limitation with maintaining/changing body position. (Progressing, not met)  2. Pt will perform lumbar twisting motions with good control to demonstrate improved core strength. (Progressing, not met)  3. Pt will report no pain with sitting to improve ability to return to work. (Progressing, not met)  4. Pt will report no pain during lumbar ROM to promote functional mobility. (Progressing, not met)  5. Pt will return to work reporting pain less than or equal to 1/10 at end of work shift. (Progressing, not met)    Plan     Cont POC to progress towards " established goals.  Continue to expand core stabilization and functional mobility.    Carly Leon, PTA

## 2020-01-14 ENCOUNTER — CLINICAL SUPPORT (OUTPATIENT)
Dept: REHABILITATION | Facility: HOSPITAL | Age: 30
End: 2020-01-14
Attending: ORTHOPAEDIC SURGERY
Payer: COMMERCIAL

## 2020-01-14 ENCOUNTER — CLINICAL SUPPORT (OUTPATIENT)
Dept: REHABILITATION | Facility: HOSPITAL | Age: 30
End: 2020-01-14
Payer: COMMERCIAL

## 2020-01-14 DIAGNOSIS — M53.86 DECREASED RANGE OF MOTION OF LUMBAR SPINE: ICD-10-CM

## 2020-01-14 DIAGNOSIS — M54.50 ACUTE LEFT-SIDED LOW BACK PAIN WITHOUT SCIATICA: ICD-10-CM

## 2020-01-14 DIAGNOSIS — R29.898 IMPAIRED FLEXIBILITY OF LOWER EXTREMITY: ICD-10-CM

## 2020-01-14 DIAGNOSIS — Z78.9 DECREASED INDEPENDENCE WITH ACTIVITIES OF DAILY LIVING: ICD-10-CM

## 2020-01-14 DIAGNOSIS — M25.531 RIGHT WRIST PAIN: ICD-10-CM

## 2020-01-14 PROCEDURE — 97110 THERAPEUTIC EXERCISES: CPT | Mod: PN

## 2020-01-14 PROCEDURE — 97110 THERAPEUTIC EXERCISES: CPT | Mod: PN,CQ

## 2020-01-14 NOTE — PROGRESS NOTES
"  Physical Therapy Daily Treatment Note     [facility]  [facility] Number[facility] [id number]    Therapy Diagnosis:   No diagnosis found.  Physician: Lucia Arreola PA-C    Visit Date: 2020    Physician Orders: PT Eval and Treat  Medical Diagnosis from Referral: M54.5 (ICD-10-CM) - Acute bilateral low back pain without sciatica  Evaluation Date: 2019  Authorization Period Expiration: 2019  Plan of Care Expiration: 2019 to 2020  Visit # / Visits authorized:   FOTO: 5/10 *** DONE  PTA visit:      Time In: *** PM  Time Out: *** PM  Total Billable Time: *** minutes (*** TE)     Precautions: Standard    Subjective     Pt reports: he is feeling a little better.  Still not back to work yet, hasn't been active much at home. States he sees his MD on Friday.  He was compliant with home exercise program.  Response to previous treatment: no adverse reaction  Functional change: none    Pain: 0/10  Location: left low back      Objective     Gurpreet received therapeutic exercises to develop strength, flexibility and core stabilization for *** minutes including:    - supine HSS w/strap 3x30" B  - LTRs:  5" x 2'  - TA Contractions: 5" hold 2x10  - Brace Marchin' x 3  - Brace March with alt UE lifts:  2x10  - Bridging: 3x10, 3" holds  - Prone Press ups: 3x10  - Standing extension 3x10  - Cat-Camel 20x  - Leg Press:  4.0 3x10 DL  - NuStep - NEXT?    Home Exercises Provided and Patient Education Provided     Education provided:   - stressed importance of performing HEP regularly to maximize therapy benefits  - importance of beginning to increase activity    Written Home Exercises Provided: Patient instructed to cont prior HEP.  Exercises were reviewed and Gurpreet was able to demonstrate them prior to the end of the session.  Gurpreet demonstrated good  understanding of the education provided.     See EMR under Patient Instructions for exercises provided on 2019      Assessment     Pt tolerates " "therapy activities including expanded core stabilization and trial on Leg Press without complaint of difficulties or c/o increased pain.  States "good - no pain" after treatment.     Gurpreet is progressing well towards his goals.   Pt prognosis is Excellent.     Pt will continue to benefit from skilled outpatient physical therapy to address the deficits listed in the problem list box on initial evaluation, provide pt/family education and to maximize pt's level of independence in the home and community environment.     Pt's spiritual, cultural and educational needs considered and pt agreeable to plan of care and goals.    Anticipated barriers to physical therapy: Co-morbidities, pain in right wrist/brace on right wrist    Goals:   Short Term Goals (3 Weeks):  1. Pt will be compliant with HEP to supplement PT in decreasing pain with functional mobility. (Progressing, not met)  2. Pt will report decreased pain with sitting at less than or equal to 3/10 to improve ability to return to work. (Progressing, not met)  3. Pt will improve lumbar ROM to </=minimal loss in all planes to improve functional mobility. (Progressing, not met)     Long Term Goals (6 Weeks):   1. Pt will improve FOTO score to </= 34% limited to decrease perceived limitation with maintaining/changing body position. (Progressing, not met)  2. Pt will perform lumbar twisting motions with good control to demonstrate improved core strength. (Progressing, not met)  3. Pt will report no pain with sitting to improve ability to return to work. (Progressing, not met)  4. Pt will report no pain during lumbar ROM to promote functional mobility. (Progressing, not met)  5. Pt will return to work reporting pain less than or equal to 1/10 at end of work shift. (Progressing, not met)    Plan     Cont POC to progress towards established goals.  Continue to expand core stabilization and functional mobility.    Gloria Mayorga, PT   "

## 2020-01-14 NOTE — PROGRESS NOTES
"  Occupational Therapy Daily Treatment Note / DISCHARGE SUMMARY     Date: 1/14/2020  Name: Gurpreet Youngblood  Clinic Number: [id number]    Therapy Diagnosis:   Encounter Diagnoses   Name Primary?    Right wrist pain     Decreased independence with activities of daily living      Physician: Zack Caputo Jr., *    Physician Orders: eval and treat  Medical Diagnosis: M25.531 (ICD-10-CM) - Right wrist pain   Date of Injury/Onset: 11/04/2019  Evaluation Date: 11/18/2019  Insurance Authorization Period Expiration: 12/31/2019  Plan of Care Certification Period: 11/18/2019-01/17/2020  Date of Return to MD: TBD     Visit # / Visits authorized: 3/ 25 * HARD LIMIT- COMBINED WITH PT*     FOTO: initial eval        Time In:1030am  Time Out:1045am  Total treatment time: 15minutes  Total Timed minutes: 15 minutes     Precautions:  Standard and HIV precautions       Subjective     Pt reports: " It feels better. I'm not having pain."  he was compliant with home exercise program given last session.   Response to previous treatment:decreased pain  Functional change: none reported    Pain: 0/10  Location: right wrist    Objective        Edema. Measured in centimeters.    11/18/2019 11/18/2019     Left Right                   Wrist Crease 18.0 18.0           MCPs 22.0 22.0         Elbow and Wrist ROM. Measured in degrees.    11/18/2019 11/18/2019 01/14/2020     Left Right Right     AROM AROM AROM   Supination/Pronation 60/60 50/60 60/70   Wrist Ext/Flex 75/60 50/50 65/55   Wrist RD/UD 25/30 15/15 15/15      Hand ROM. Measured in degrees.    11/18/2019 11/18/2019 01/14/2020     Left Right Right   Composite fist yes yes yes            Thumb: MP 50 35 40                IP 46 55 65       Rad ADD/ABD 45 45 45       Pal ADD/ABD 50 45 45          Opposition SFMP crs SFMP crease tight SFMP crease tight       Strength (Dynamometer) and Pinch Strength (Pinch Gauge)  Measured in pounds.    11/18/2019 11/18/2019 01/14/2020     Left Right " Right   Rung II 90 45 95   Jean Pinch 27 24 26   3pt Pinch 30 18 19      Sensation:   Occasionally tingling feeling and sometimes colder than the other side         Home Exercises and Education Provided     Education provided: CONT HEP  - Progress towards goals     Written Home Exercises Provided: Patient instructed to cont prior HEP.  Exercises were reviewed and Gurpreet was able to demonstrate them prior to the end of the session.  Gurpreet demonstrated good  understanding of the education provided.   .   See EMR under Patient Instructions for exercises provided 11/25/2019.     Assessment     Pt. Presents after hiatus from therapy. He is feeling better. He does not report pain. He states he is at prior level of function. He has significant increase in strength and ROM this date. His FOTO score is increased 24 points.  After reassessment pt does not require skilled intervention at this time. He will be d/c'd this date.   Anticipated barriers to continued occupational therapy: none    Pt's spiritual, cultural and educational needs considered and pt agreeable to plan of care and goals.    Goals     Goals:   The following goals were discussed with the patient and patient is in agreement with them as to be addressed in the treatment plan.   Long Term Goals (LTGs); to be met by discharge.  LTG #1: Pt will report a pain level of 0 out of 10 with ADLS  MET  LTG #2: Pt will demo improved FOTO score by 20 points.  -MET  LTG #3: Pt will return to prior level of function for ADLs and household management.  -MET     Short Term Goals (STGs); to be met within 4 weeks (12/20/2019).  STG #1a: Pt will report 2 out of 10 pain level with ADLS. -MET  STG #2a: Pt will report/demo [place] with typing for return to work . -MET  STG #2b: Pt will report/demo [place] with opening jars/containers for return to self care and simple meal prep -MET  STG #3a: Pt will demonstrate independence with issued HEP.  -MET  STG #3b: Pt will demo  improved   strength on the right by 10# in order to return to work occupations MET   -progressing  Plan   Pt. To be d/c'd from skilled OT this date. Pt. In agreement    Dinorah Phoenix OTR/L,[facility]

## 2020-01-14 NOTE — PROGRESS NOTES
"  Physical Therapy Daily Treatment Note     [facility]  [id number]    Therapy Diagnosis:   Encounter Diagnoses   Name Primary?    Decreased range of motion of lumbar spine     Acute left-sided low back pain without sciatica     Impaired flexibility of lower extremity      Physician: Lucia Arreola PA-C    Visit Date: 2020    Physician Orders: PT Eval and Treat  Medical Diagnosis from Referral: M54.5 (ICD-10-CM) - Acute bilateral low back pain without sciatica  Evaluation Date: 2019  Authorization Period Expiration: 2019  Plan of Care Expiration: 2019 to 2020  Visit # / Visits authorized:   FOTO: 5/10  COMPLETED  PTA visit:      Time In: 11:15  Time Out: 12:00  Total Billable Time: 30 minutes (2 TE)     Precautions: Standard    Subjective     Pt reports: he is feeling a little better.  States he saw  his MD on Friday who wants him to continue PT another month.  He was compliant with home exercise program.  Response to previous treatment: no adverse reaction  Functional change: less pain    Pain: 1/10  Location: left low back      Objective     Gurpreet received therapeutic exercises to develop strength, flexibility and core stabilization for 45 minutes including:    - supine HSS w/strap 3x30" B  - LTRs:  5" x 2'  - TA Contractions: 5" hold 2x10  - Brace Marchin' x 3  - Brace March with alt UE lifts:  2x10  - Bridging: 3x10, 3" holds  - Prone Press ups: 3x10  - Standing extension 3x10  - Cat-Camel 20x  - Leg Press:  5.0 3x10 DL  - NuStep - NEXT?    Home Exercises Provided and Patient Education Provided     Education provided:   - stressed importance of performing HEP regularly to maximize therapy benefits  - importance of beginning to increase activity    Written Home Exercises Provided: Patient instructed to cont prior HEP.  Exercises were reviewed and Gurpreet was able to demonstrate them prior to the end of the session.  Gurpreet demonstrated good  " understanding of the education provided.     See EMR under Patient Instructions for exercises provided on 12/09/2019      Assessment     Pt tolerates therapy activities including expanded core stabilization and added resistance on Leg Press without complaint of difficulties or c/o increased pain. Decreased pain following interventions.   Gurpreet is progressing well towards his goals.   Pt prognosis is Excellent.     Pt will continue to benefit from skilled outpatient physical therapy to address the deficits listed in the problem list box on initial evaluation, provide pt/family education and to maximize pt's level of independence in the home and community environment.     Pt's spiritual, cultural and educational needs considered and pt agreeable to plan of care and goals.    Anticipated barriers to physical therapy: Co-morbidities, pain in right wrist/brace on right wrist    Goals:   Short Term Goals (3 Weeks):  1. Pt will be compliant with HEP to supplement PT in decreasing pain with functional mobility. (Progressing, not met)  2. Pt will report decreased pain with sitting at less than or equal to 3/10 to improve ability to return to work. (Progressing, not met)  3. Pt will improve lumbar ROM to </=minimal loss in all planes to improve functional mobility. (Progressing, not met)     Long Term Goals (6 Weeks):   1. Pt will improve FOTO score to </= 34% limited to decrease perceived limitation with maintaining/changing body position. (Progressing, not met)  2. Pt will perform lumbar twisting motions with good control to demonstrate improved core strength. (Progressing, not met)  3. Pt will report no pain with sitting to improve ability to return to work. (Progressing, not met)  4. Pt will report no pain during lumbar ROM to promote functional mobility. (Progressing, not met)  5. Pt will return to work reporting pain less than or equal to 1/10 at end of work shift. (Progressing, not met)    Plan     Cont POC to progress  towards established goals.  Continue to expand core stabilization and functional mobility.    Adelfo Lu, PTA

## 2020-01-16 ENCOUNTER — CLINICAL SUPPORT (OUTPATIENT)
Dept: REHABILITATION | Facility: HOSPITAL | Age: 30
End: 2020-01-16
Payer: COMMERCIAL

## 2020-01-16 DIAGNOSIS — R29.898 IMPAIRED FLEXIBILITY OF LOWER EXTREMITY: ICD-10-CM

## 2020-01-16 DIAGNOSIS — M54.50 ACUTE LEFT-SIDED LOW BACK PAIN WITHOUT SCIATICA: ICD-10-CM

## 2020-01-16 DIAGNOSIS — M53.86 DECREASED RANGE OF MOTION OF LUMBAR SPINE: ICD-10-CM

## 2020-01-16 PROCEDURE — 97110 THERAPEUTIC EXERCISES: CPT | Mod: PN

## 2020-01-16 NOTE — PROGRESS NOTES
"  Physical Therapy Daily Treatment Note     Name: Gurpreet Youngblood  Clinic Number: [id number]    Therapy Diagnosis:   Encounter Diagnoses   Name Primary?    Decreased range of motion of lumbar spine     Acute left-sided low back pain without sciatica     Impaired flexibility of lower extremity      Physician: Lucia Arreola PA-C    Visit Date: 2020    Physician Orders: PT Eval and Treat  Medical Diagnosis from Referral: M54.5 (ICD-10-CM) - Acute bilateral low back pain without sciatica  Evaluation Date: 2019  Authorization Period Expiration: 2019  Plan of Care Expiration: 2019 to 2020  Visit # / Visits authorized:   FOTO: 6/10  PTA visit:      Time In: 12:05 PM  Time Out: 1:00 PM  Total Billable Time: 55 minutes (4 TE)     Precautions: Standard    Subjective     Pt reports: states that he only feels his back pain when he's doing active things. For example he was cleaning his closet and moving things around his kitchen he felt some pain, but when he's still he has no pain. States this is an improvement as previously he would have constant back pain, and now it's only when he's active.  He was compliant with home exercise program.  Response to previous treatment: no adverse reaction  Functional change: less pain    Pain: 0/10  Location: left low back      Objective     Gurpreet received therapeutic exercises to develop strength, flexibility and core stabilization for 55 minutes including:    - Supine HSS w/strap 3x30" B  - LTRs:  5" x 2'  - TA Contractions: 5" hold 2x10  - Brace Marchin' x 3  - Brace March with alt UE lifts:  2x10  - Bridging: 3x10, 3" holds  - Prone Press ups: 3x10  - Cat-Camel 20x    - Standing extension 3x10  - Deadlifts: 2x10, 3# dumbbells, B    - Leg Press:  5.0 3x10 DL  - Cybex Knee Extension: 2x10, 50#       Home Exercises Provided and Patient Education Provided     Education provided:   - stressed importance of performing HEP regularly to maximize " therapy benefits  - importance of beginning to increase activity    Written Home Exercises Provided: Patient instructed to cont prior HEP.  Exercises were reviewed and Gurpreet was able to demonstrate them prior to the end of the session.  Gurpreet demonstrated good  understanding of the education provided.     See EMR under Patient Instructions for exercises provided on 12/09/2019      Assessment     Patient tolerated all supine and resisted exercises with no increases in pain or discomfort. Tolerated the addition of deadlifts and resisted knee extension, with moderate verbal cueing to maintain flat back when performing deadlifts.   Gurpreet is progressing well towards his goals.   Pt prognosis is Excellent.     Pt will continue to benefit from skilled outpatient physical therapy to address the deficits listed in the problem list box on initial evaluation, provide pt/family education and to maximize pt's level of independence in the home and community environment.     Pt's spiritual, cultural and educational needs considered and pt agreeable to plan of care and goals.    Anticipated barriers to physical therapy: Co-morbidities, pain in right wrist/brace on right wrist    Goals:   Short Term Goals (3 Weeks):  1. Pt will be compliant with HEP to supplement PT in decreasing pain with functional mobility. (Progressing, not met)  2. Pt will report decreased pain with sitting at less than or equal to 3/10 to improve ability to return to work. (Progressing, not met)  3. Pt will improve lumbar ROM to </=minimal loss in all planes to improve functional mobility. (Progressing, not met)     Long Term Goals (6 Weeks):   1. Pt will improve FOTO score to </= 34% limited to decrease perceived limitation with maintaining/changing body position. (Progressing, not met)  2. Pt will perform lumbar twisting motions with good control to demonstrate improved core strength. (Progressing, not met)  3. Pt will report no pain with sitting to improve  ability to return to work. (Progressing, not met)  4. Pt will report no pain during lumbar ROM to promote functional mobility. (Progressing, not met)  5. Pt will return to work reporting pain less than or equal to 1/10 at end of work shift. (Progressing, not met)    Plan     Cont POC to progress towards established goals.    Continue to expand core stabilization and functional mobility.    Gloria Mayorga, PT

## 2020-01-20 ENCOUNTER — CLINICAL SUPPORT (OUTPATIENT)
Dept: REHABILITATION | Facility: HOSPITAL | Age: 30
End: 2020-01-20
Payer: COMMERCIAL

## 2020-01-20 DIAGNOSIS — M54.50 ACUTE LEFT-SIDED LOW BACK PAIN WITHOUT SCIATICA: ICD-10-CM

## 2020-01-20 DIAGNOSIS — R29.898 IMPAIRED FLEXIBILITY OF LOWER EXTREMITY: ICD-10-CM

## 2020-01-20 DIAGNOSIS — M53.86 DECREASED RANGE OF MOTION OF LUMBAR SPINE: ICD-10-CM

## 2020-01-20 PROCEDURE — 97110 THERAPEUTIC EXERCISES: CPT | Mod: PN

## 2020-01-20 NOTE — PROGRESS NOTES
"  Physical Therapy Daily Treatment Note     Name: Gurpreet Youngblood  Clinic Number: [id number]    Therapy Diagnosis:   Encounter Diagnoses   Name Primary?    Decreased range of motion of lumbar spine     Acute left-sided low back pain without sciatica     Impaired flexibility of lower extremity      Physician: Lucia Arreola PA-C    Visit Date: 2020    Physician Orders: PT Eval and Treat  Medical Diagnosis from Referral: M54.5 (ICD-10-CM) - Acute bilateral low back pain without sciatica  Evaluation Date: 2019  Authorization Period Expiration: 2019  Plan of Care Expiration: 2019 to 2020  Visit # / Visits authorized:   FOTO: 7/10  PTA visit:      Time In: 905  Time Out: 955  Total Billable Time: 20 minutes (1 TE)  Precautions: Standard    Subjective     Pt reports: no new complaints.  Just woke up per patient.  No complaints of low back pain   He was compliant with home exercise program.  Response to previous treatment: no adverse reaction  Functional change: less pain  Pain: 0/10  Location: left low back      Objective     Gurpreet received therapeutic exercises to develop strength, flexibility and core stabilization for 20 minutes with PT 1:1 including assessment and 30 minutes under supervision:    - Supine HSS w/strap 3x30" B  - LTRs:  5" x 2'  - TA Contractions: 5" hold 2x10  - Brace Marchin' x 3  - Brace March with alt UE lifts:  2x10  - Bridging: 3x10, 3" holds  - Prone Press ups: 3x10  - Cat-Camel 20x    - Standing extension 3x10  - Deadlifts: 2x10, 3# dumbbells, B    - Leg Press:  5.0 3x10 DL  - Cybex Knee Extension: 2x10, 50#       Home Exercises Provided and Patient Education Provided     Education provided:   - stressed importance of performing HEP regularly to maximize therapy benefits  - importance of beginning to increase activity    Written Home Exercises Provided: Patient instructed to cont prior HEP.  Exercises were reviewed and Gurpreet was able to demonstrate " them prior to the end of the session.  Gurpreet demonstrated good  understanding of the education provided.     See EMR under Patient Instructions for exercises provided on 12/09/2019      Assessment   A: No lumbar pain only back muscle tightness voiced following session today.     Gurpreet is progressing well towards his goals.   Pt prognosis is Excellent.     Pt will continue to benefit from skilled outpatient physical therapy to address the deficits listed in the problem list box on initial evaluation, provide pt/family education and to maximize pt's level of independence in the home and community environment.   Pt's spiritual, cultural and educational needs considered and pt agreeable to plan of care and goals.    Anticipated barriers to physical therapy: Co-morbidities, pain in right wrist/brace on right wrist    Goals:   Short Term Goals (3 Weeks):  1. Pt will be compliant with HEP to supplement PT in decreasing pain with functional mobility. (Progressing, not met)  2. Pt will report decreased pain with sitting at less than or equal to 3/10 to improve ability to return to work. (Progressing, not met)  3. Pt will improve lumbar ROM to </=minimal loss in all planes to improve functional mobility. (Progressing, not met)     Long Term Goals (6 Weeks):   1. Pt will improve FOTO score to </= 34% limited to decrease perceived limitation with maintaining/changing body position. (Progressing, not met)  2. Pt will perform lumbar twisting motions with good control to demonstrate improved core strength. (Progressing, not met)  3. Pt will report no pain with sitting to improve ability to return to work. (Progressing, not met)  4. Pt will report no pain during lumbar ROM to promote functional mobility. (Progressing, not met)  5. Pt will return to work reporting pain less than or equal to 1/10 at end of work shift. (Progressing, not met)    Plan     Cont POC to progress towards established goals.    Continue to expand core  stabilization and functional mobility.    Lg Anderson, PT

## 2020-01-21 ENCOUNTER — DOCUMENTATION ONLY (OUTPATIENT)
Dept: REHABILITATION | Facility: HOSPITAL | Age: 30
End: 2020-01-21

## 2020-01-21 DIAGNOSIS — M53.86 DECREASED RANGE OF MOTION OF LUMBAR SPINE: ICD-10-CM

## 2020-01-21 DIAGNOSIS — R29.898 IMPAIRED FLEXIBILITY OF LOWER EXTREMITY: ICD-10-CM

## 2020-01-21 DIAGNOSIS — M54.50 ACUTE LEFT-SIDED LOW BACK PAIN WITHOUT SCIATICA: ICD-10-CM

## 2020-01-23 ENCOUNTER — CLINICAL SUPPORT (OUTPATIENT)
Dept: REHABILITATION | Facility: HOSPITAL | Age: 30
End: 2020-01-23
Payer: COMMERCIAL

## 2020-01-23 DIAGNOSIS — M54.50 ACUTE LEFT-SIDED LOW BACK PAIN WITHOUT SCIATICA: ICD-10-CM

## 2020-01-23 DIAGNOSIS — R29.898 IMPAIRED FLEXIBILITY OF LOWER EXTREMITY: ICD-10-CM

## 2020-01-23 DIAGNOSIS — M53.86 DECREASED RANGE OF MOTION OF LUMBAR SPINE: ICD-10-CM

## 2020-01-23 PROCEDURE — 97110 THERAPEUTIC EXERCISES: CPT | Mod: PN

## 2020-01-23 NOTE — PLAN OF CARE
Outpatient Therapy Updated Plan of Care     Visit Date: 1/23/2020    Name: Gurpreet Youngblood  Clinic Number: [id number]    Therapy Diagnosis:   Encounter Diagnoses   Name Primary?    Decreased range of motion of lumbar spine     Acute left-sided low back pain without sciatica     Impaired flexibility of lower extremity      Physician: Lucia Arreola PA-C    Physician Orders: PT Eval and Treat  Medical Diagnosis from Referral: M54.5 (ICD-10-CM) - Acute bilateral low back pain without sciatica  Evaluation Date: 12/9/2019  Current Certification Period:  12/9/2019 to 1/24/2020  Authorization Period Expiration: 12/31/2019  Plan of Care Expiration: 12/9/2019 to 1/24/2020  Visit # / Visits authorized: 8/38    Precautions: Standard  Functional Level Prior to Evaluation:  Independent    Subjective     Update:   Pt reports: that his back is continuing to get better, he only feels it when he's in motion or active. Has an MD appointment on 2/10/2020.   He was compliant with home exercise program.  Response to previous treatment: no adverse reaction  Functional change: decreased pain with activity  Pain: 1/10  Location: left low back      Objective     Update:     Improvements are bolded, regressions in red:    Range of Motion/Strength:      Thoracolumbar AROM Pain/Dysfunction with Movement   Flexion 60     Extension 20    Right side bending 30     Left side bending 25     Right rotation 60     Left rotation 40     *denotes pain with movement     Assessment     Update: Patient has improved significantly since initial evaluation. Upon testing thoracolumbar ROM, patient improved in all directions outside of left rotation and extension. Extension decreased in motion, however patient was pain-free whereas he previously expressed increased pain with extension. Patient's pain has also progressed as during initial evaluation he was constantly in pain, however currently he only has pain with activity or sitting for longer  than 15 minutes at a time. Patient would continue to benefit from skilled physical therapy to achieve functional mobility in thoracolumbar spine, to improve core strength, and to improve pain when sitting to return to work.         Previous Short Term Goals Status:   1. Pt will be compliant with HEP to supplement PT in decreasing pain with functional mobility. (Progressing, not met)  2. Pt will report decreased pain with sitting at less than or equal to 3/10 to improve ability to return to work. MET 1/23/2020; 3/10 pain after sitting 15 minutes, pain relieved with movement  3. Pt will improve lumbar ROM to </=minimal loss in all planes to improve functional mobility. MET 1/23/2020    New Short Term Goals Status:   D/c goals 2 and 3. Continue goal 1.      Previous Long Term Goals (6 Weeks):   1. Pt will improve FOTO score to </= 34% limited to decrease perceived limitation with maintaining/changing body position. (Progressing, not met); 35% limitation on 1/14/2020  2. Pt will perform lumbar twisting motions with good control to demonstrate improved core strength. (Progressing, not met); 3/10 pain  3. Pt will report no pain with sitting to improve ability to return to work. (Progressing, not met)  4. Pt will report no pain during lumbar ROM to promote functional mobility. (Progressing, not met)  5. Pt will return to work reporting pain less than or equal to 1/10 at end of work shift. (Progressing, not met)    Long Term Goal Status:   continue per initial plan of care.    Reasons for Recertification of Therapy:   Expiration of current POC.     Plan     Updated Certification Period: 1/23/2020 to 3/6/2020  Recommended Treatment Plan: 2 times per week for 6 weeks: Cervical/Lumbar Traction, Electrical Stimulation , FDN, Gait Training, Manual Therapy, Moist Heat/ Ice, Neuromuscular Re-ed, Patient Education, Therapeutic Activites, Therapeutic Exercise, Ultrasound and ASTYM, Kinseiotape  Other Recommendations: Check with  update from MD following appointment on 2/10/2020    Gloria Mayorga PT, DPT  1/23/2020      I CERTIFY THE NEED FOR THESE SERVICES FURNISHED UNDER THIS PLAN OF TREATMENT AND WHILE UNDER MY CARE    Physician's comments:        Physician's Signature: ___________________________________________________

## 2020-01-23 NOTE — PROGRESS NOTES
"  Physical Therapy Daily Treatment Note     Name: Gurpreet Youngblood  Clinic Number: [id number]    Therapy Diagnosis:   Encounter Diagnoses   Name Primary?    Decreased range of motion of lumbar spine     Acute left-sided low back pain without sciatica     Impaired flexibility of lower extremity      Physician: Lucia Arreola PA-C    Visit Date: 2020    Physician Orders: PT Eval and Treat  Medical Diagnosis from Referral: M54.5 (ICD-10-CM) - Acute bilateral low back pain without sciatica  Evaluation Date: 2019  Authorization Period Expiration: 2019  Plan of Care Expiration: 2019 to 2020  Visit # / Visits authorized:   FOTO: 8/10  PTA visit:      Time In: 12:10 PM    Time Out: 1:00 PM  Total Billable Time: 50 minutes (3 TE)  Precautions: Standard    Subjective     Pt reports: that his back is continuing to get better, he only feels it when he's in motion or active. Has an MD appointment on 2/10/2020.   He was compliant with home exercise program.  Response to previous treatment: no adverse reaction  Functional change: decreased pain with activity  Pain: 1/10  Location: left low back      Objective     Gurpreet received therapeutic exercises to develop strength, flexibility and core stabilization for 50 minutes including objective measurements:    - Recumbent Bike: 8 minutes, Level 6.0  - Long-sitting HS stretch: 30"x3, B  - LTRs:  5" x 2'  - TA Contractions: 5" hold 2x10  - Brace Marchin' x 3  - Brace March with alt UE lifts:  2x10  - Bridging: 3x10, 3" holds    - Prone Press ups: 3x10  - Cat-Camel 20x - OOT  - Quadruped alternating arm and opposite leg: x15, B    - Standing hip extension 3x10 - OOT  - Deadlifts: 2x10, 3# dumbbells, B - OOT    - Leg Press:  7.0, 3x10 DL  - Cybex Knee Extension: 2x10, 50# - OOT      Home Exercises Provided and Patient Education Provided     Education provided:   - stressed importance of performing HEP regularly to maximize therapy benefits  - " importance of beginning to increase activity    Written Home Exercises Provided: Patient instructed to cont prior HEP.  Exercises were reviewed and Gurpreet was able to demonstrate them prior to the end of the session.  Gurpreet demonstrated good  understanding of the education provided.     See EMR under Patient Instructions for exercises provided on 12/09/2019      Assessment   See updated POC.     Gurpreet is progressing well towards his goals.   Pt prognosis is Excellent.     Pt will continue to benefit from skilled outpatient physical therapy to address the deficits listed in the problem list box on initial evaluation, provide pt/family education and to maximize pt's level of independence in the home and community environment.   Pt's spiritual, cultural and educational needs considered and pt agreeable to plan of care and goals.    Anticipated barriers to physical therapy: Co-morbidities, pain in right wrist/brace on right wrist    Goals:   See updated POC.     Plan     See updated POC.     Gloria Mayorga, PT

## 2020-01-30 ENCOUNTER — CLINICAL SUPPORT (OUTPATIENT)
Dept: REHABILITATION | Facility: HOSPITAL | Age: 30
End: 2020-01-30
Payer: COMMERCIAL

## 2020-01-30 ENCOUNTER — OFFICE VISIT (OUTPATIENT)
Dept: ORTHOPEDICS | Facility: CLINIC | Age: 30
End: 2020-01-30
Payer: OTHER GOVERNMENT

## 2020-01-30 ENCOUNTER — PATIENT MESSAGE (OUTPATIENT)
Dept: ORTHOPEDICS | Facility: CLINIC | Age: 30
End: 2020-01-30

## 2020-01-30 DIAGNOSIS — R29.898 IMPAIRED FLEXIBILITY OF LOWER EXTREMITY: ICD-10-CM

## 2020-01-30 DIAGNOSIS — M53.86 DECREASED RANGE OF MOTION OF LUMBAR SPINE: ICD-10-CM

## 2020-01-30 DIAGNOSIS — S63.501D SPRAIN OF RIGHT WRIST, SUBSEQUENT ENCOUNTER: ICD-10-CM

## 2020-01-30 DIAGNOSIS — M54.50 ACUTE LEFT-SIDED LOW BACK PAIN WITHOUT SCIATICA: ICD-10-CM

## 2020-01-30 PROBLEM — S63.501A SPRAIN OF RIGHT WRIST: Status: ACTIVE | Noted: 2020-01-30

## 2020-01-30 PROCEDURE — 97110 THERAPEUTIC EXERCISES: CPT | Mod: PN,CQ

## 2020-01-30 PROCEDURE — 99999 PR PBB SHADOW E&M-EST. PATIENT-LVL II: ICD-10-PCS | Mod: PBBFAC,,, | Performed by: ORTHOPAEDIC SURGERY

## 2020-01-30 PROCEDURE — 99213 PR OFFICE/OUTPT VISIT, EST, LEVL III, 20-29 MIN: ICD-10-PCS | Mod: S$GLB,,, | Performed by: ORTHOPAEDIC SURGERY

## 2020-01-30 PROCEDURE — 99999 PR PBB SHADOW E&M-EST. PATIENT-LVL II: CPT | Mod: PBBFAC,,, | Performed by: ORTHOPAEDIC SURGERY

## 2020-01-30 PROCEDURE — 99213 OFFICE O/P EST LOW 20 MIN: CPT | Mod: S$GLB,,, | Performed by: ORTHOPAEDIC SURGERY

## 2020-01-30 RX ORDER — KETOROLAC TROMETHAMINE 10 MG/1
TABLET, FILM COATED ORAL
COMMUNITY
Start: 2019-12-27 | End: 2021-05-21 | Stop reason: CLARIF

## 2020-01-30 NOTE — LETTER
January 30, 2020      [facility]  [address],   [place] [address]  Phone: [phone]       Patient: Gurpreet Youngblood   YOB: 1990  Date of Visit: 01/30/2020    To Whom It May Concern:    Donna Youngblood  was at [facility] on 01/30/2020. If you have any questions or concerns, or if I can be of further assistance, please do not hesitate to contact me.    Sincerely,      Arlyn Alexander LPN

## 2020-01-30 NOTE — PROGRESS NOTES
"  Physical Therapy Daily Treatment Note     Name: Gurpreet Youngblood  Clinic Number: [id number]    Therapy Diagnosis:   Encounter Diagnoses   Name Primary?    Decreased range of motion of lumbar spine     Acute left-sided low back pain without sciatica     Impaired flexibility of lower extremity      Physician: Lucia Arreola PA-C    Visit Date: 2020    Physician Orders: PT Eval and Treat  Medical Diagnosis from Referral: M54.5 (ICD-10-CM) - Acute bilateral low back pain without sciatica  Evaluation Date: 2019  Authorization Period Expiration: 2019  Plan of Care Expiration: 2019 to 2020  Visit # / Visits authorized:   FOTO: 9/10  PTA visit:      Time In: 12:00 PM    Time Out: 1:00 PM  Total Billable Time: 60 minutes (4 TE)  Precautions: Standard    Subjective     Pt reports: he took a long car ride to [facility] which exacerbated pain. Has an MD appointment on 2/10/2020.   He was compliant with home exercise program.  Response to previous treatment: no adverse reaction  Functional change: decreased pain with activity  Pain: 10  Location: left low back      Objective     Gurpreet received therapeutic exercises to develop strength, flexibility and core stabilization for 60 minutes including objective measurements:    - Recumbent Bike: 8 minutes, Level 6.0  - Long-sitting HS stretch: 30"x3, B  - LTRs:  5" x 2'  - TA Contractions: 5" hold 2x10  - Brace Marchin' x 3  - Brace March with alt UE lifts: 2 minutes  - Bridging: 3x10, 3" holds    - Prone Press ups: 3x10  - Cat-Camel 20x -   - Quadruped alternating arm and opposite leg: x15, B    - Standing hip extension 3x10 - OOT  - Deadlifts: 2x10, 3# dumbbells, B - OOT    - Leg Press:  7.0, 3x10 DL  - Cybex Knee Extension: 2x10, 50# - OOT      Home Exercises Provided and Patient Education Provided     Education provided:   - stressed importance of performing HEP regularly to maximize therapy benefits  - importance of beginning to " increase activity    Written Home Exercises Provided: Patient instructed to cont prior HEP.  Exercises were reviewed and Gurpreet was able to demonstrate them prior to the end of the session.  Gurpreet demonstrated good  understanding of the education provided.     See EMR under Patient Instructions for exercises provided on 12/09/2019      Assessment    Gurpreet tolerated all interventions w/o increased pain. Reports decreased pain symptoms following session and feels fatigued.     Gurpreet is progressing well towards his goals.   Pt prognosis is Excellent.     Pt will continue to benefit from skilled outpatient physical therapy to address the deficits listed in the problem list box on initial evaluation, provide pt/family education and to maximize pt's level of independence in the home and community environment.   Pt's spiritual, cultural and educational needs considered and pt agreeable to plan of care and goals.    Anticipated barriers to physical therapy: Co-morbidities, pain in right wrist/brace on right wrist    Goals:   New Short Term Goals Status:   D/c goals 2 and 3. Continue goal 1.      Previous Long Term Goals (6 Weeks):   1. Pt will improve FOTO score to </= 34% limited to decrease perceived limitation with maintaining/changing body position. (Progressing, not met); 35% limitation on 1/14/2020  2. Pt will perform lumbar twisting motions with good control to demonstrate improved core strength. (Progressing, not met); 3/10 pain  3. Pt will report no pain with sitting to improve ability to return to work. (Progressing, not met)  4. Pt will report no pain during lumbar ROM to promote functional mobility. (Progressing, not met)  5. Pt will return to work reporting pain less than or equal to 1/10 at end of work shift. (Progressing, not met)       Plan     See updated POC.     Adelfo Lu, PTA

## 2020-01-30 NOTE — PROGRESS NOTES
Subjective:      Patient ID: Gurpreet Youngblood is a 29 y.o. male.  Chief Complaint: Pain of the Right Wrist      HPI  Gurpreet Youngblood is a  29 y.o. male presenting today for follow up of right wrist injury.  He reports that he is doing well he finished up physical therapy and feels like he is back to normal with the wrist no pain reported.    Review of patient's allergies indicates:  No Known Allergies      Current Outpatient Medications   Medication Sig Dispense Refill    ketorolac (TORADOL) 10 mg tablet       ibuprofen (ADVIL,MOTRIN) 600 MG tablet Take 1 tablet (600 mg total) by mouth every 6 (six) hours as needed for Pain. (Patient not taking: Reported on 1/30/2020) 20 tablet 0    nabumetone (RELAFEN) 500 MG tablet Take 1 tablet (500 mg total) by mouth 2 (two) times daily with meals. (Patient not taking: Reported on 1/30/2020) 60 tablet 1     No current facility-administered medications for this visit.        Past Medical History:   Diagnosis Date    Anxiety     Concussion     Fatigue     Headache(784.0)     HIV (human immunodeficiency virus infection)     Immune deficiency disorder     PTSD (post-traumatic stress disorder)        No past surgical history on file.    OBJECTIVE:   PHYSICAL EXAM:       Vitals:    01/30/20 1620   PainSc: 0-No pain     Ortho/SPM Exam  Examination right hand and wrist no tenderness no swelling  Full range of motion  Good strength  Sensation intact    RADIOGRAPHS:  None  Comments: I have personally reviewed the imaging and I agree with the above radiologist's report.    ASSESSMENT/PLAN:     IMPRESSION:  Right wrist sprain improved    PLAN:  I would like him to continue home exercise program mainly to work on strengthening  He can return to work full duty  No restriction    FOLLOW UP:  As needed only    Disclaimer: This note has been generated using voice-recognition software. There may be typographical errors that have been missed during proof-reading.

## 2020-02-03 ENCOUNTER — CLINICAL SUPPORT (OUTPATIENT)
Dept: REHABILITATION | Facility: HOSPITAL | Age: 30
End: 2020-02-03
Payer: COMMERCIAL

## 2020-02-03 DIAGNOSIS — M54.50 ACUTE LEFT-SIDED LOW BACK PAIN WITHOUT SCIATICA: ICD-10-CM

## 2020-02-03 DIAGNOSIS — M53.86 DECREASED RANGE OF MOTION OF LUMBAR SPINE: ICD-10-CM

## 2020-02-03 DIAGNOSIS — R29.898 IMPAIRED FLEXIBILITY OF LOWER EXTREMITY: ICD-10-CM

## 2020-02-03 PROCEDURE — 97110 THERAPEUTIC EXERCISES: CPT | Mod: PN | Performed by: PHYSICAL THERAPIST

## 2020-02-03 NOTE — PROGRESS NOTES
"  Physical Therapy Daily Treatment Note     [facility]  [facility] Number[facility] [id number]    Therapy Diagnosis:   Encounter Diagnoses   Name Primary?    Decreased range of motion of lumbar spine     Acute left-sided low back pain without sciatica     Impaired flexibility of lower extremity      Physician: Lucia Arreola PA-C    Visit Date: 2/3/2020    Physician Orders: PT Eval and Treat  Medical Diagnosis from Referral: M54.5 (ICD-10-CM) - Acute bilateral low back pain without sciatica  Evaluation Date: 2019  Authorization Period Expiration: 2019  Plan of Care Expiration: 3/6/2020  Visit # / Visits authorized: 10/38  FOTO:   PTA visit:      Time In: 12:00 PM    Time Out: 12:55 PM  Total Billable Time: 55 minutes (4 TE)  Precautions: Standard    Subjective     Pt reports: he had some pain/soreness in his L lower back and R mid back after last session that improved throughout the weekend   He was compliant with home exercise program.  Response to previous treatment: no adverse reaction  Functional change: decreased pain with activity  Pain: 1/10  Location: left low back, R mid back       Objective     Gurpreet received therapeutic exercises to develop strength, flexibility and core stabilization for 55 minutes including objective measurements:    - Recumbent Bike: 8 minutes, Level 6.0  - Long-sitting HS stretch: 30"x3, B  - LTRs:  5" x 2'  - TA Contractions: 5" hold 2x10  - Brace Marchin' x 3  - Brace March with alt UE lifts: 2 minutes  - Bridging: 3x10, 3" holds    - Quadruped alternating arm and opposite leg: x15, B     - Deadlifts: 2x10, 3# dumbbells, B   - Leg Press:  8.0, 3x10 DL  - Cybex Knee Extension: 2x10, 50#       Home Exercises Provided and Patient Education Provided     Education provided:   - Continued current HEP   - Discontinue extension exercises     Written Home Exercises Provided: Patient instructed to cont prior HEP.  Exercises were reviewed and Gurpreet was able to " demonstrate them prior to the end of the session.  Gurpreet demonstrated good  understanding of the education provided.     See EMR under Patient Instructions for exercises provided on 12/09/2019      Assessment   Pt tolerated all interventions w/o increased pain. Reports pain in L lower back and R mid back after last session that improved over the weekend. Exercises modified from last session.     Gurpreet is progressing well towards his goals.   Pt prognosis is Excellent.     Pt will continue to benefit from skilled outpatient physical therapy to address the deficits listed in the problem list box on initial evaluation, provide pt/family education and to maximize pt's level of independence in the home and community environment.   Pt's spiritual, cultural and educational needs considered and pt agreeable to plan of care and goals.    Anticipated barriers to physical therapy: Co-morbidities, pain in right wrist/brace on right wrist    Goals:   New Short Term Goals Status:   D/c goals 2 and 3. Continue goal 1.      Previous Long Term Goals (6 Weeks):   1. Pt will improve FOTO score to </= 34% limited to decrease perceived limitation with maintaining/changing body position. (Progressing, not met); 35% limitation on 1/14/2020  2. Pt will perform lumbar twisting motions with good control to demonstrate improved core strength. (Progressing, not met); 3/10 pain  3. Pt will report no pain with sitting to improve ability to return to work. (Progressing, not met)  4. Pt will report no pain during lumbar ROM to promote functional mobility. (Progressing, not met)  5. Pt will return to work reporting pain less than or equal to 1/10 at end of work shift. (Progressing, not met)       Plan     Continue POC    Tej Subramanian, PT

## 2020-02-05 NOTE — PROGRESS NOTES
"  Physical Therapy Daily Treatment Note     [facility]  [facility] Number[facility] [id number]    Therapy Diagnosis:   Encounter Diagnoses   Name Primary?    Decreased range of motion of lumbar spine     Acute left-sided low back pain without sciatica     Impaired flexibility of lower extremity      Physician: Lucia Arreola PA-C    Visit Date: 2020    Physician Orders: PT Eval and Treat  Medical Diagnosis from Referral: M54.5 (ICD-10-CM) - Acute bilateral low back pain without sciatica  Evaluation Date: 2019  Authorization Period Expiration: 2020  Plan of Care Expiration: 3/6/2020  Visit # / Visits authorized:  (11 total)  FOTO:   PTA visit:      Time In: 11:08 AM    Time Out: 12:00 PM  Total Billable Time: 52 minutes (3 TE)  Precautions: Standard    Subjective     Pt reports: he currently has no pain. Is returning to MD on Monday to determine if he will need to continue therapy or be able to return back to work.   He was compliant with home exercise program.  Response to previous treatment: no adverse reaction  Functional change: No pain with activity  Pain: 0/10    Location: left low back, R mid back       Objective     Gurpreet received therapeutic exercises to develop strength, flexibility and core stabilization for 52 minutes including:    - Recumbent Bike: 8 minutes, Level 6.0  - Long-sitting HS stretch: 30"x3, B  - LTRs:  5" x 2'  - TA Contractions: 5" hold 2x10  - Brace Marchin' x 3 - not today  - Brace March with alt UE lifts: 2 minutes - not today  - Bridging: 3x10, 3" holds    - Quadruped alternating arm and opposite leg: x20, B    - Heel Raises: 2x10, 2#  - Standing Hip ABD: 2x10, 2#, B       - Deadlifts: 2x10, 4# dumbbells, B   - Leg Press:  8.0, 3x10 DL   - Cybex Knee Extension: 2x10, 50# - NOT TODAY      Home Exercises Provided and Patient Education Provided     Education provided:   - Continued current HEP   - Discontinue extension exercises     Written Home " Exercises Provided: Patient instructed to cont prior HEP.  Exercises were reviewed and Gurpreet was able to demonstrate them prior to the end of the session.  Gurpreet demonstrated good  understanding of the education provided.     See EMR under Patient Instructions for exercises provided on 12/09/2019      Assessment   Patient tolerated all exercises with no increases in pain or discomfort; started session with no pain and ended session with no pain. Is set to return to MD to determine if he will need to continue therapy or will be able to return to work. Continues to make improvements with pain as he previously had slight pain with activity, but was able to participate in entire session without pain.     Gurpreet is progressing well towards his goals.   Pt prognosis is Excellent.     Pt will continue to benefit from skilled outpatient physical therapy to address the deficits listed in the problem list box on initial evaluation, provide pt/family education and to maximize pt's level of independence in the home and community environment.   Pt's spiritual, cultural and educational needs considered and pt agreeable to plan of care and goals.    Anticipated barriers to physical therapy: Co-morbidities, pain in right wrist/brace on right wrist    Goals:   Short Term Goals:  1. Pt will be compliant with HEP to supplement PT in decreasing pain with functional mobility. (Progressing, not met)     Long Term Goals (6 Weeks):   1. Pt will improve FOTO score to </= 34% limited to decrease perceived limitation with maintaining/changing body position. (Progressing, not met); 35% limitation on 1/14/2020  2. Pt will perform lumbar twisting motions with good control to demonstrate improved core strength. (Progressing, not met); 3/10 pain  3. Pt will report no pain with sitting to improve ability to return to work. MET  4. Pt will report no pain during lumbar ROM to promote functional mobility. (Progressing, not met)  5. Pt will return to work  reporting pain less than or equal to 1/10 at end of work shift. (Progressing, not met)       Plan     Patient will call following MD appointment with an update.     Gloria Mayorga, PT

## 2020-02-06 ENCOUNTER — CLINICAL SUPPORT (OUTPATIENT)
Dept: REHABILITATION | Facility: HOSPITAL | Age: 30
End: 2020-02-06
Payer: COMMERCIAL

## 2020-02-06 DIAGNOSIS — M54.50 ACUTE LEFT-SIDED LOW BACK PAIN WITHOUT SCIATICA: ICD-10-CM

## 2020-02-06 DIAGNOSIS — R29.898 IMPAIRED FLEXIBILITY OF LOWER EXTREMITY: ICD-10-CM

## 2020-02-06 DIAGNOSIS — M53.86 DECREASED RANGE OF MOTION OF LUMBAR SPINE: ICD-10-CM

## 2020-02-06 PROCEDURE — 97110 THERAPEUTIC EXERCISES: CPT | Mod: PN

## 2020-03-24 ENCOUNTER — DOCUMENTATION ONLY (OUTPATIENT)
Dept: REHABILITATION | Facility: HOSPITAL | Age: 30
End: 2020-03-24

## 2020-03-24 DIAGNOSIS — M54.50 ACUTE LEFT-SIDED LOW BACK PAIN WITHOUT SCIATICA: ICD-10-CM

## 2020-03-24 DIAGNOSIS — M53.86 DECREASED RANGE OF MOTION OF LUMBAR SPINE: ICD-10-CM

## 2020-03-24 DIAGNOSIS — R29.898 IMPAIRED FLEXIBILITY OF LOWER EXTREMITY: ICD-10-CM

## 2020-03-24 NOTE — PROGRESS NOTES
Outpatient Therapy Discharge Summary     Name: Gurpreet Youngblood  [facility] Number: [id number]    Therapy Diagnosis:   Encounter Diagnoses   Name Primary?    Decreased range of motion of lumbar spine     Acute left-sided low back pain without sciatica     Impaired flexibility of lower extremity      Physician: Lucia Arreola PA-C    Physician Orders: PT Eval and Treat  Medical Diagnosis from Referral: M54.5 (ICD-10-CM) - Acute bilateral low back pain without sciatica  Evaluation Date: 12/9/2019    Date of Last visit: 2/6/2020  Total Visits Received: 10  Cancelled Visits: 0  No Show Visits: 0    Assessment    Goals:   Short Term Goals:  1. Pt will be compliant with HEP to supplement PT in decreasing pain with functional mobility. NOT MET     Long Term Goals (6 Weeks):   1. Pt will improve FOTO score to </= 34% limited to decrease perceived limitation with maintaining/changing body position.NOT MET 35% limitation on 1/14/2020  2. Pt will perform lumbar twisting motions with good control to demonstrate improved core strength. NOT MET; 3/10 pain  3. Pt will report no pain with sitting to improve ability to return to work. MET  4. Pt will report no pain during lumbar ROM to promote functional mobility. NOT MET  5. Pt will return to work reporting pain less than or equal to 1/10 at end of work shift. NOT MET    Discharge reason: Patient has not attended therapy since 2/6/2020    Plan   This patient is discharged from Physical Therapy

## 2020-03-25 ENCOUNTER — HOSPITAL ENCOUNTER (EMERGENCY)
Facility: HOSPITAL | Age: 30
Discharge: HOME | End: 2020-03-25
Attending: EMERGENCY MEDICINE
Payer: COMMERCIAL

## 2020-03-25 VITALS
HEART RATE: 94 BPM | OXYGEN SATURATION: 100 % | WEIGHT: 155 LBS | DIASTOLIC BLOOD PRESSURE: 83 MMHG | TEMPERATURE: 98 F | HEIGHT: 66 IN | BODY MASS INDEX: 24.91 KG/M2 | SYSTOLIC BLOOD PRESSURE: 151 MMHG | RESPIRATION RATE: 16 BRPM

## 2020-03-25 DIAGNOSIS — R07.89 CHEST TIGHTNESS: Primary | ICD-10-CM

## 2020-03-25 DIAGNOSIS — R07.9 CHEST PAIN: ICD-10-CM

## 2020-03-25 PROBLEM — R29.898 IMPAIRED FLEXIBILITY OF LOWER EXTREMITY: Status: RESOLVED | Noted: 2019-12-11 | Resolved: 2020-03-25

## 2020-03-25 PROBLEM — M54.50 ACUTE LEFT-SIDED LOW BACK PAIN WITHOUT SCIATICA: Status: RESOLVED | Noted: 2019-12-11 | Resolved: 2020-03-25

## 2020-03-25 PROBLEM — M53.86 DECREASED RANGE OF MOTION OF LUMBAR SPINE: Status: RESOLVED | Noted: 2019-12-11 | Resolved: 2020-03-25

## 2020-03-25 PROCEDURE — 93010 ELECTROCARDIOGRAM REPORT: CPT | Mod: ,,, | Performed by: INTERNAL MEDICINE

## 2020-03-25 PROCEDURE — 93005 ELECTROCARDIOGRAM TRACING: CPT

## 2020-03-25 PROCEDURE — 99283 EMERGENCY DEPT VISIT LOW MDM: CPT

## 2020-03-25 PROCEDURE — 93010 EKG 12-LEAD: ICD-10-PCS | Mod: ,,, | Performed by: INTERNAL MEDICINE

## 2020-03-26 NOTE — DISCHARGE INSTRUCTIONS
Try Benadryl and Claritin.  Please reach out to her primary doctor for continued evaluation.  Please return to the ED immediately if symptoms return, change or worsen in any way.  Please call your primary doctor today for reevaluation appointment.

## 2020-03-26 NOTE — ED NOTES
Discharge instructions given and explained.  Follow up care discussed at this time.  COVID 19 information packet given and list of testing sites given.

## 2020-03-26 NOTE — ED PROVIDER NOTES
"SCRIBE #1 NOTE: I, Jabier Najera, am scribing for, and in the presence of, Danish Peralta MD.         Chief Complaint  Chief Complaint   Patient presents with    Chest Pain     29y M ambulatory to ED with c/o chest tightness x2 days, concerned after he woke up from a nap with a "stuffy nose."       HPI  Gurpreet Youngblood is a 29 y.o. male who presents with c/o chest tightness that started yesterday.    Patient reports he was concerned after waking from a nap with a "stuff nose." No treatment was attempted prior to arrival. He denies any other complaints including fever, chills, cough, or difficulty breathing.    Past medical history  Past Medical History:   Diagnosis Date    Anxiety     Concussion     Fatigue     Headache(784.0)     HIV (human immunodeficiency virus infection)     Immune deficiency disorder     PTSD (post-traumatic stress disorder)        Current Medications  No current facility-administered medications for this encounter.     Current Outpatient Medications:     ibuprofen (ADVIL,MOTRIN) 600 MG tablet, Take 1 tablet (600 mg total) by mouth every 6 (six) hours as needed for Pain. (Patient not taking: Reported on 1/30/2020), Disp: 20 tablet, Rfl: 0    ketorolac (TORADOL) 10 mg tablet, , Disp: , Rfl:     nabumetone (RELAFEN) 500 MG tablet, Take 1 tablet (500 mg total) by mouth 2 (two) times daily with meals. (Patient not taking: Reported on 1/30/2020), Disp: 60 tablet, Rfl: 1    Allergies  Review of patient's allergies indicates:  No Known Allergies    Surgical history  No past surgical history on file.    Social history  Social History     Socioeconomic History    Marital status: Single     Spouse name: Not on file    Number of children: 0    Years of education: Not on file    Highest education level: Not on file   Occupational History    Not on file   Social Needs    Financial resource strain: Not on file    Food insecurity:     Worry: Not on file     Inability: Not on file    " Transportation needs:     Medical: Not on file     Non-medical: Not on file   Tobacco Use    Smoking status: Never Smoker    Smokeless tobacco: Never Used   Substance and Sexual Activity    Alcohol use: No     Alcohol/week: 0.0 standard drinks    Drug use: No    Sexual activity: Yes     Partners: Female   Lifestyle    Physical activity:     Days per week: Not on file     Minutes per session: Not on file    Stress: Not on file   Relationships    Social connections:     Talks on phone: Not on file     Gets together: Not on file     Attends [employer] service: Not on file     Active member of club or organization: Not on file     Attends meetings of clubs or organizations: Not on file     Relationship status: Not on file   Other Topics Concern    Patient feels they ought to cut down on drinking/drug use Not Asked    Patient annoyed by others criticizing their drinking/drug use Not Asked    Patient has felt bad or guilty about drinking/drug use Not Asked    Patient has had a drink/used drugs as an eye opener in the AM Not Asked   Social History Narrative    Not on file       Family History  Family History   Problem Relation Age of Onset    Stroke Mother     Hypertension Mother     No Known Problems Sister     No Known Problems Brother     Stroke Maternal Aunt     Cancer Maternal Grandmother     Cancer Maternal Grandfather     Melanoma Neg Hx     Psoriasis Neg Hx     Lupus Neg Hx        Review of systems  Constitutional: No fever or weakness.  Eyes: No redness, pain, or discharge.  HENT: + rhinorrhea, No ear pain, no sudden onset headache, no throat pain.  Respiratory: No wheezing, cough, or shortness of breath.  Cardiovascular: + chest tightness, No palpitations.  GI: No abdominal pain, nausea, vomiting, or diarrhea.  : No dysuria or discharge.  Musculoskeletal: No injury; full range of motion.  Skin: No rash, abscess, or laceration.  Neurologic: No new focal weakness or sensory changes.  All  "systems otherwise negative except as noted in ROS and HPI    Physical Exam  Vital signs: BP (!) 151/83 (BP Location: Left arm, Patient Position: Sitting)   Pulse 94   Temp 98.3 °F (36.8 °C) (Oral)   Resp 16   Ht 5' 6" (1.676 m)   Wt 70.3 kg (155 lb)   SpO2 100%   BMI 25.02 kg/m²   Constitutional: No acute distress.  Well developed, alert, oriented and appropriate.  HENT: Normocephalic, atraumatic. Normal ear, nose, and throat.  Mild rhinorrhea noted  Eyes: PERRL, EOMI, normal conjunctiva.  Neck: Normal range of motion, no tenderness; supple.  Respiratory: Nonlabored breathing with normal breath sounds.  Cardiovascular: RRR with no pulse deficit.  GI: Soft, nontender, no rebound or guarding.  Musculoskeletal: Normal ROM, no tenderness, injury, or edema.  Skin: Warm, dry skin without infection or injury.  Neurologic: Normal motor, sensation with no new focal deficit.  Psychiatric: Affect normal, judgement normal, mood normal.  No SI, HI, and not gravely disabled.    Labs  Pertinent labs reviewed (see chart for details)  Labs Reviewed - No data to display    ECG  Results for orders placed or performed during the hospital encounter of 03/25/20   EKG 12-lead    Collection Time: 03/25/20  7:55 PM    Narrative    Test Reason : R07.9,    Vent. Rate : 093 BPM     Atrial Rate : 093 BPM     P-R Int : 146 ms          QRS Dur : 088 ms      QT Int : 344 ms       P-R-T Axes : 089 077 071 degrees     QTc Int : 427 ms    Normal sinus rhythm with sinus arrhythmia  Right atrial enlargement  Right ventricular conduction delay  Borderline Abnormal ECG  No previous ECGs available  Confirmed by Jesus Bonner MD ([id number]) on 3/26/2020 9:40:02 AM    Referred By: [email or web address]   SELF           Confirmed By:Jesus Bonner MD     ECG interpreted by ED MD    Radiology    No orders to display       Procedures  Procedures    Medications - No data to display    ED course and medical decision making    ED Course as of Mar 28 2337   Wed Mar 25, " 2020 2008 EKG shows normal sinus rhythm rate of 93 beats per minute with nose ST elevation MI.  Normal QTC    [MB]      ED Course User Index  [MB] Danish Peralta MD       This does not sound consistent with acute coronary syndrome.  We discussed that this might be a viral syndrome although no significant proof at this time.  We discussed the different possibilities including even corona virus.  We are unsure of its etiology but patient does not need antibiotics, or hospitalizations, no signs of a acute coronary syndrome or pulmonary embolism at this time.  Patient is comfortable and happy with plan to go home.  He will reach out to his primary doctor for continued evaluation of these symptoms of they continue or he understands to return emergency department if symptoms change or worsen in any way or persist and do not improve    Disposition    Patient discharged in stable condition      Final impression  1. Chest tightness    2. Chest pain        Critical care time spent with this patient was 0 minutes excluding the procedure time.       Attending Attestation:           Physician Attestation for Scribe:  Physician Attestation Statement for Scribe #1: I, Danish Peralta MD, reviewed documentation, as scribed by Jabier Najera in my presence, and it is both accurate and complete.        Danish Peralta MD  03/28/20 [id number]

## 2020-05-05 ENCOUNTER — PATIENT MESSAGE (OUTPATIENT)
Dept: ORTHOPEDICS | Facility: CLINIC | Age: 30
End: 2020-05-05

## 2020-06-18 DIAGNOSIS — M51.86 OTHER INTERVERTEBRAL DISC DISORDERS, LUMBAR REGION: Primary | ICD-10-CM

## 2020-07-23 ENCOUNTER — CLINICAL SUPPORT (OUTPATIENT)
Dept: REHABILITATION | Facility: HOSPITAL | Age: 30
End: 2020-07-23
Payer: OTHER GOVERNMENT

## 2020-07-23 DIAGNOSIS — M54.50 ACUTE BILATERAL LOW BACK PAIN WITHOUT SCIATICA: ICD-10-CM

## 2020-07-23 DIAGNOSIS — M53.86 DECREASED ROM OF LUMBAR SPINE: ICD-10-CM

## 2020-07-23 DIAGNOSIS — R53.1 DECREASED STRENGTH: ICD-10-CM

## 2020-07-23 PROCEDURE — 97530 THERAPEUTIC ACTIVITIES: CPT | Mod: PN

## 2020-07-23 PROCEDURE — 97161 PT EVAL LOW COMPLEX 20 MIN: CPT | Mod: PN

## 2020-07-23 NOTE — PLAN OF CARE
[facility]  Physical Therapy Initial Evaluation    [id number]    Therapy Diagnosis:   Encounter Diagnoses   Name Primary?    Decreased ROM of lumbar spine     Decreased strength     Acute bilateral low back pain without sciatica      Physician: Andrew Aguirre Jr.,*    Physician Orders: PT Eval and Treat: Dinorah Protocol/technique, Desensitization  Medical Diagnosis from Referral:  M51.86 (ICD-10-CM) - Other intervertebral disc disorders, lumbar region  Evaluation Date: 7/23/2020  Authorization Period Expiration: 10/7/2020  Plan of Care Expiration: 8/14/2020  Visit # / Visits authorized: 1/24    Time In: 3:45  Time Out: 4:15  Total Appointment Time (timed & untimed codes): 30 minutes (1 LCE, 1 TA)    Precautions: Anxiety, concussion, HIV, PTSD    Subjective   Date of injury: November 2019  History of current condition - [facility] reports: he was sitting on a raised chair at work, the back of the chair broke off, and he fell backwards on his back. He came to [facility] for his back pain last year that did not improve much, received a lumbar injection on June 1st at [facility] without much benefit, and he is now here for the same issue. His pain used to be located on the right side of his low back and used to be constant, now his pain is episodic that worsens with walking, and now has episodic pain at L side of low back. He has difficulty with sitting in certain chairs, bending, lifting, and other various activities where he works too hard. He notices his pain most in the middle of the day and at night. His pain used to wake him up in the middle of the night. Pt denies ever having any pain down his legs, no numbness or tingling, and no changes in bowel/bladder patterns.     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 Job duties swtich every 30 minutes on normal duties, he is currently on sedentary  duty.  Job Demands: Normally require him to bend/lift/carry bags, pat people down, prolonged sitting  Prior Medical Treatment: Injection(s), Medication and Imaging  Current Work Restrictions: 10# restriction, sedentary duty, and a sturdy chair with lumbar support    Medical History:   Past Medical History:   Diagnosis Date    Anxiety     Concussion     Fatigue     Headache(784.0)     HIV (human immunodeficiency virus infection)     Immune deficiency disorder     PTSD (post-traumatic stress disorder)        Surgical History:   Gurpreet Youngblood  has no past surgical history on file.    Medications:   Gurpreet has a current medication list which includes the following prescription(s): ibuprofen, ketorolac, and nabumetone.    Allergies:   Review of patient's allergies indicates:  No Known Allergies     Imaging, MRI studies: 12/17/2019  Mild degenerative changes of the lumbar spine most pronounced at L5-S1 where facet arthropathy and mild posterior broad-based disc bulge result in mild right neural foraminal narrowing.    Social History: [employer] with steps to enter    Pain:  Current 1/10, worst 5/10, best 0/10   Location: R side of low back  Description: Throbbing and Sharp  Aggravating Factors: Standing, Bending and Walking  Alleviating Factors: pain medication, injection and rest    Pt's goals: Return to gainful employment. To get this resolved 100%.           Objective     Gait: RLE ER compared to LLE,   Posture: mild decreased lumbar lordosis  Reflexes:    Clonus: negative B   Lee's Test: negative   Babinski Test: not tested  DTR:    Right Left Comment   Patellar (L3-4) 2+ 2+    Achilles (S1) 2+ 2+      Sensation: WNL  Palpation: +TTP at lumbar parapsinals, L SI joint  Joint mobility: decreased lumbar mobility  Flexibility: decreased HS length    A/PROM and MMT:  * = low back pain with testing  NT = Not tested  AROM: (degrees) LUMBAR   Flexion (40-50) 75%, low back tightness and no pain   Extension (15-20)  90%, mild L sided low back pain   Right side bending (~20) 90%, L low back stretching   Left side bending  (~20) 100%   Right rotation (5-10) 100%   Left rotation (5-10) 100%     Hip  Right   Left  Pain/Dysfunction with Movement    AROM PROM MMT AROM PROM MMT    Flexion (L2,120 deg) WFL WFL 4/5 WFL WFL 4/5    Extension (20 deg) WFL WFL 4/5 WFL WFL 4/5    Abduction (L5, 45 deg) WFL WFL 4-/5 WFL WFL 4-/5    Adduction (30 deg) WFL WFL 5/5 WFL WFL 5/5    IR (30 deg) WFL WFL 5/5 WFL WFL 5/5    ER (45 deg) WFL WFL 4+/5 WFL WFL 4/5       Knee  Right   Left  Pain/Dysfunction with Movement    AROM PROM MMT AROM PROM MMT    Flexion (S2, 140 deg) WFL WFL 5/5 WFL WFL 5/5    Extension (L3, 0-5 deg) WFL WFL 5/5 WFL WFL 5/5      Ankle  Right   Left  Pain/Dysfunction with Movement    AROM PROM MMT AROM PROM MMT    Dorsiflexion (L4, 20 deg) WFL WFL 5/5 WFL WFL 5/5    Plantarflexion (S1, 50 deg) WFL WFL 5/5 WFL WFL 5/5    Inversion (20-30 deg) WFL WFL 5/5 WFL WFL 5/5    Eversion (5-10 deg) WFL WFL 5/5 WFL WFL 5/5    Great toe extension (L5, 70 deg) WFL WFL 5/5 WFL WFL 5/5      Lumbar Tests:  Vertical compression, elbow flexion, lumbar protective mechanism testing: decreased lumbar stability  Slump test = negative B  Quadrant test = slight discomfort at B end ranges  SLR Test (L4-S3) = negative B  Lumbar distraction = not tested  SL Bridge Test (glut med strength) = not tested  Modified slump test in S/L (femoral nerve) = not tested  Ely's test (L2,L3,L4) = negative B  Prone Instability Test = not tested  Sign of the buttock (very poor SLR, PROM hip flex with knee bent, and non-capsular pattern) = negative    Muscle length Tests:  HS Length Test (80 degrees) = about 70 degrees B    Cluster for stabilization:   Age < 40,  SLR > 91, +PIT, aberrant motion = Met 1/4    Cluster for spinal stenosis:   Nitish s/s, leg > LBP, pain during walking/standing, relief upon sitting, > 47 y/o = Met 0/4    Hip Special Tests:  FADIR = negative B  MICHAEL (+  if knee higher than other knee) = negative B    Functional Job Specific Testing:   - 10# box lift from waist height mat, 5x  - 10# box lift from floor to waist, 10x    Job Specific Task Job Demands Current Ability Deficit? (Yes or No)   1. Lifting/bending occassionally  never Yes   2. Standing  occassionally  never Yes   (Testing should not be performed beyond that of the patient's job demands.)    Limitation/Restriction for FOTO Lumbar Spine Survey    Therapist reviewed FOTO scores for Gurpreet Jaswinder Youngblood on 7/23/2020.   FOTO documents entered into invi - see Media section.    Limitation Score: 40%     TREATMENT   Treatment Time In: 4:05  Treatment Time Out: 4:15  Total Treatment time (time-based codes) separate from Evaluation: 10 minutes     Gurpreet received the following treatment separate from the evaluation:             - Therapeutic activities.   - 10# box lift from waist height mat, 5x  - 10# box lift from floor to waist, 10x    Home Exercises and Patient Education Provided  Education provided:   - avoid concordant pain  - resume activities per tolerance within MD restrictions    Written Home Exercises Provided: not today.  Exercises were reviewed and Gurpreet was able to demonstrate them prior to the end of the session.  Gurpreet demonstrated good  understanding of the education provided.     See EMR under Media for exercises provided not today.    Assessment     Pt is a 31 y/o male dx with Other intervertebral disc disorders, lumbar region presenting to PT at [facility]. Pt currently presents with B low back pain, decreased cervical/lumbar ROM, decreased BLE and lumbar strength, impaired posture, and functional deficits with lifting, carrying, prolonged walking and standing. Pt would benefit from skilled PT consisting of muscular skeletal stretching/strengthening, manual therapy, neuro muscular re-education, and modalities prn to address limitations and increase functional  mobility.    Pt prognosis is Excellent.     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    Plan of care discussed with patient: Yes  Pt's spiritual, cultural and educational needs considered and patient is agreeable to the plan of care and goals as stated below:     Anticipated Barriers for therapy: chronicity of current injury    Medical Necessity is demonstrated by the following  History  Co-morbidities and personal factors that may impact the plan of care Co-morbidities:    Anxiety, concussion, HIV, PTSD    Personal Factors:   no deficits     moderate   Examination  Body Structures and Functions, activity limitations and participation restrictions that may impact the plan of care Body Regions:   back  lower extremities  trunk    Body Systems:    gross symmetry  ROM  strength  gross coordinated movement  balance  gait  transfers  transitions  motor control    Participation Restrictions:   None    Activity limitations:   Learning and applying knowledge  no deficits    General Tasks and Commands  no deficits    Communication  no deficits    Mobility  lifting and carrying objects  walking    Self care  no deficits    Domestic Life  no deficits    Interactions/Relationships  no deficits    Life Areas  no deficits    Community and Social Life  no deficits         high   Clinical Presentation stable and uncomplicated low   Decision Making/ Complexity Score: low       Goals:   GOALS: Short Term Goals: 1 weeks  1. Pt will demo good TA muscle contraction for improved deep abdominal strength and lumbar stability.  2. Pt will demo good sitting/standing posture and body mechanics for improved spine health and decreased risk of future injury.  3. Pt to tolerate HEP to improve ROM and independence with ADL's.    Long Term Goals: 3 weeks  1. Report decreased low back pain without  radiculopathy to </= 0/10  to increase tolerance for return to work duties  2. Increase strength to >/= 4+/5 MMT grade for BLE and core stability to increase tolerance for ADL and work activities.  3. Pt to demonstrate negative SLR and/or Slump Test in order to show improved core strength and decreased nerve/dural tension.  4. Patient's goal: Return to gainful employment. To get this resolved 100%.  5. Pt will report at 27% impaired on FOTO lumbar score for low back pain disability to demonstrate decrease in disability and improvement in back pain.  6. Increase lumbar ROM to WNL in order to improve functional mobility.   7. Pt will perform full floor to waist box lifts of 10# 20x with good form    Pt will return to work full duty, full time.    Plan   Plan of care Certification: 7/23/2020 to 8/14/2020.    Outpatient Physical Therapy 3 times weekly for 3 weeks to include the following interventions: Aquatic Therapy, Cervical/Lumbar Traction, Electrical Stimulation IFC/Russian, Gait Training, Manual Therapy, Moist Heat/ Ice, Neuromuscular Re-ed, Orthotic Management and Training, Patient Education, Self Care, Therapeutic Activites and Therapeutic Exercise.     Upon discharge from further skilled PT intervention it will determined if the need for a work conditioning program or Functional Capacity Evaluation is required to allow the injured worker to return to work with full potential achieved, continued improvement with body mechanics with advanced functional activities, and further prevention of future work-related injuries.     Dagoberto Ambriz, PT  7/27/2020

## 2020-07-27 PROBLEM — M53.86 DECREASED ROM OF LUMBAR SPINE: Status: ACTIVE | Noted: 2020-07-27

## 2020-07-27 PROBLEM — R53.1 DECREASED STRENGTH: Status: ACTIVE | Noted: 2020-07-27

## 2020-07-27 PROBLEM — M54.50 ACUTE BILATERAL LOW BACK PAIN WITHOUT SCIATICA: Status: ACTIVE | Noted: 2020-07-27

## 2020-07-28 ENCOUNTER — CLINICAL SUPPORT (OUTPATIENT)
Dept: REHABILITATION | Facility: HOSPITAL | Age: 30
End: 2020-07-28
Payer: OTHER GOVERNMENT

## 2020-07-28 DIAGNOSIS — R53.1 DECREASED STRENGTH: ICD-10-CM

## 2020-07-28 DIAGNOSIS — M54.50 ACUTE BILATERAL LOW BACK PAIN WITHOUT SCIATICA: ICD-10-CM

## 2020-07-28 DIAGNOSIS — M53.86 DECREASED ROM OF LUMBAR SPINE: ICD-10-CM

## 2020-07-28 PROCEDURE — 97110 THERAPEUTIC EXERCISES: CPT | Mod: PN

## 2020-07-28 NOTE — PROGRESS NOTES
Physical Therapy Daily Treatment Note     Name: Gurpreet Youngblood  Clinic Number: [id number]    Therapy Diagnosis:   Encounter Diagnoses   Name Primary?    Decreased ROM of lumbar spine     Decreased strength     Acute bilateral low back pain without sciatica      Physician: Lucia Arreola PA-C    Visit Date: 7/28/2020    Physician Orders: PT Eval and Treat: Dinorah Protocol/technique, Desensitization  Medical Diagnosis from Referral:  M51.86 (ICD-10-CM) - Other intervertebral disc disorders, lumbar region  Evaluation Date: 7/23/2020  Authorization Period Expiration: 10/7/2020  Plan of Care Expiration: 8/14/2020  Visit # / Visits authorized: 2/24    (# of No Show Appts 0/Number of Cancelled Appts 0)    Time In: 12:15pm  Time Out: 1:00pm  Total Appointment Time (timed & untimed codes): 45 minutes    Precautions: Standard    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  Job Demands: Normally require him to bend/lift/carry bags, pat people down, prolonged sitting  Current Work Restrictions: 10# restriction, sedentary duty, and a sturdy chair with lumbar support    Subjective   Pt is a 29 y/o male dx with Other intervertebral disc disorders, lumbar region presenting to PT at [facility] and [facility]. Pt currently presents with B low back pain, decreased cervical/lumbar ROM, decreased BLE and lumbar strength, impaired posture, and functional deficits with lifting, carrying, prolonged walking and standing. Pt would benefit from skilled PT consisting of muscular skeletal stretching/strengthening, manual therapy, neuro muscular re-education, and modalities prn to address limitations and increase functional mobility.     Pt reports: ongoing LBP across the lower back. Reports yesterday that he had to stand for most of the day because the chairs that were at his station were the same ones  that broke when he fell originally. Reports due to this feeling a little more achy today, otherwise doing ok.   He was compliant with home exercise program.  Response to previous treatment: No adverse response to eval  Functional change: None noted at this time as today is his first f/u    Pain: 4/10  Location: Lower back straight across     Objective/Treatment     Gurpreet received therapeutic exercises to develop strength, endurance, ROM, flexibility and core stabilization for 45 minutes including:  - Prone press up x20  - Prone hip extension x20  - Prone Supermans 2x10  - SL Plank hip abduction 1x15ea  - QP Bird Dogs 2x10  - Step up pull down BTB 1x15 each  - Standing repeated extension vs green peanut Swiss ball x30    Potentially planned  - Prone cobras   - Farmer carry  - Suitcase lifts  - Multifidi walks  - Prone MWM lumbar flexion PRN    Gurpreet participated in dynamic functional therapeutic activities to improve functional performance for 0 minutes, including:  - 10# box lift from waist height mat, 5x  - 10# box lift from floor to waist, 10x    Home Exercises Provided and Patient Education Provided     Education provided:   - POC    Written Home Exercises Provided: Patient instructed to cont prior HEP.  Exercises were reviewed and Gurpreet was able to demonstrate them prior to the end of the session.  Gurpreet demonstrated good  understanding of the education provided.     See EMR under Patient Instructions for exercises provided prior visit.    Assessment     Pt is a 29 y/o male dx with Other intervertebral disc disorders, lumbar region presenting to PT at [facility]. He was referred with Malik Protocol/technique, Desensitization to the lumbar spine.   Pt presents with s/s consistent with referred Dx at this time.     Today initiated extension based ROM and core/functional stabilization activities. Pt did exceptionally well with all activities though did report fatigue post session as  would anticipate with high level of activities performed.   Pt would benefit from skilled PT consisting of muscular skeletal stretching/strengthening, manual therapy, neuro muscular re-education, and modalities prn to address limitations and increase functional mobility.    The patient's current job specific task deficits include the following: prolonged standing    Gurpreet is progressing well towards his goals.   Pt prognosis is Good.     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    Pt's spiritual, cultural and educational needs considered and pt agreeable to plan of care and goals.     Anticipated Barriers for therapy: chronicity of current injury    Goals:   GOALS: Short Term Goals: 1 weeks  1. Pt will demo good TA muscle contraction for improved deep abdominal strength and lumbar stability. Not Met, Ongoing 7/28/2020   2. Pt will demo good sitting/standing posture and body mechanics for improved spine health and decreased risk of future injury. Not Met, Ongoing 7/28/2020   3. Pt to tolerate HEP to improve ROM and independence with ADL's. Not Met, Ongoing 7/28/2020      Long Term Goals: 3 weeks  1. Report decreased low back pain without radiculopathy to </= 0/10  to increase tolerance for return to work duties Not Met, Ongoing 7/28/2020   2. Increase strength to >/= 4+/5 MMT grade for BLE and core stability to increase tolerance for ADL and work activities. Not Met, Ongoing 7/28/2020   3. Pt to demonstrate negative SLR and/or Slump Test in order to show improved core strength and decreased nerve/dural tension. Not Met, Ongoing 7/28/2020   4. Patient's goal: Return to gainful employment. To get this resolved 100%. Not Met, Ongoing 7/28/2020   5. Pt will report at 27%  impaired on FOTO lumbar score for low back pain disability to demonstrate decrease in disability and improvement in back pain. Not Met, Ongoing 7/28/2020   6. Increase lumbar ROM to WNL in order to improve functional mobility. Not Met, Ongoing 7/28/2020   7. Pt will perform full floor to waist box lifts of 10# 20x with good form Not Met, Ongoing 7/28/2020      Pt will return to work full duty, full time.    Plan     Plan of care Certification: 7/23/2020 to 8/14/2020.     Outpatient Physical Therapy 3 times weekly for 3 weeks to include the following interventions: Aquatic Therapy, Cervical/Lumbar Traction, Electrical Stimulation IFC/Russian, Gait Training, Manual Therapy, Moist Heat/ Ice, Neuromuscular Re-ed, Orthotic Management and Training, Patient Education, Self Care, Therapeutic Activites and Therapeutic Exercise.      Upon discharge from further skilled PT intervention it will determined if the need for a work conditioning program or Functional Capacity Evaluation is required to allow the injured worker to return to work with full potential achieved, continued improvement with body mechanics with advanced functional activities, and further prevention of future work-related injuries.       Yony Hill, PT   7/28/2020

## 2020-07-29 ENCOUNTER — CLINICAL SUPPORT (OUTPATIENT)
Dept: REHABILITATION | Facility: HOSPITAL | Age: 30
End: 2020-07-29
Payer: OTHER GOVERNMENT

## 2020-07-29 DIAGNOSIS — R53.1 DECREASED STRENGTH: ICD-10-CM

## 2020-07-29 DIAGNOSIS — M53.86 DECREASED ROM OF LUMBAR SPINE: ICD-10-CM

## 2020-07-29 DIAGNOSIS — M54.50 ACUTE BILATERAL LOW BACK PAIN WITHOUT SCIATICA: ICD-10-CM

## 2020-07-29 PROCEDURE — 97530 THERAPEUTIC ACTIVITIES: CPT | Mod: PN,CQ

## 2020-07-29 PROCEDURE — 97110 THERAPEUTIC EXERCISES: CPT | Mod: PN,CQ

## 2020-07-29 NOTE — PROGRESS NOTES
"Physical Therapy Daily Treatment Note     Name: Gurpreet Youngblood  Clinic Number: [id number]    Therapy Diagnosis:   Encounter Diagnoses   Name Primary?    Decreased ROM of lumbar spine     Decreased strength     Acute bilateral low back pain without sciatica      Physician: Lucia Arreola PA-C    Visit Date: 7/29/2020    Physician Orders: PT Eval and Treat: Dinorah Protocol/technique, Desensitization  Medical Diagnosis from Referral:  M51.86 (ICD-10-CM) - Other intervertebral disc disorders, lumbar region  Evaluation Date: 7/23/2020  Authorization Period Expiration: 10/7/2020  Plan of Care Expiration: 8/14/2020  Visit # / Visits authorized: 3/24    (# of No Show Appts 0/Number of Cancelled Appts 0)    Time In: 2:30 pm  Time Out: 3:15 pm  Total Appointment Time (timed & untimed codes): 45 minutes 3TE    Precautions: Standard    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  Job Demands: Normally require him to bend/lift/carry bags, pat people down, prolonged sitting  Current Work Restrictions: 10# restriction, sedentary duty, and a sturdy chair with lumbar support    Subjective   Pt is a 29 y/o male dx with Other intervertebral disc disorders, lumbar region presenting to PT at [facility] and [facility]. Pt currently presents with B low back pain, decreased cervical/lumbar ROM, decreased BLE and lumbar strength, impaired posture, and functional deficits with lifting, carrying, prolonged walking and standing. Pt would benefit from skilled PT consisting of muscular skeletal stretching/strengthening, manual therapy, neuro muscular re-education, and modalities prn to address limitations and increase functional mobility.     Pt reports: "I still feel sore from last time". Pt agreeable to PT session   He was compliant with home exercise program.  Response to previous treatment: No adverse " response to eval  Functional change: None noted at this time as today is his first f/u    Pain: 3/10 general soreness.   Location: B mid Lower back straight across     Objective/Treatment     Gurpreet received therapeutic exercises to develop strength, endurance, ROM, flexibility and core stabilization for 35 minutes including:  - Prone press up x 20 2 sec holds  - Prone hip extension x20  - Prone Supermans 2x10  - SL Plank hip abduction 1x15ea  - QP Bird Dogs 2x10  - Step up pull down BTB 1x15 each  - Standing repeated extension vs green peanut Swiss ball x30  - Seated Rows 10#  2x10 Cable   - Chest press with RTB 2x10 Both directions  - Horizontal ABD    YTB     2x10    Potentially planned  - Prone cobras   - Farmer carry  - Suitcase lifts  - Multifidi walks  - Prone MWM lumbar flexion PRN    Gurpreet participated in dynamic functional therapeutic activities to improve functional performance for 10 minutes, including:  - 10# box lift from waist height mat, 5x  - 10# box lift from floor to waist, 10x    Home Exercises Provided and Patient Education Provided     Education provided:   - encouraged safety with lifting mechanism    Written Home Exercises Provided: Patient instructed to cont prior HEP.  Exercises were reviewed and Gurpreet was able to demonstrate them prior to the end of the session.  Gurpreet demonstrated good  understanding of the education provided.     See EMR under Patient Instructions for exercises provided prior visit.    Assessment     Pt is a 29 y/o male dx with Other intervertebral disc disorders, lumbar region presenting to PT at [facility]. He was referred with Malik Protocol/technique, Desensitization to the lumbar spine.   Pt presents with s/s consistent with referred Dx at this time.     Pt able to complete today's session with no reports of increased lower back pain. Verbal instructions provided on postural awareness and technique correction for supine/ prone/ standing  activities. Pt cooperative, reported no adverse reactions to session     The patient's current job specific task deficits include the following: prolonged standing    Gurpreet is progressing well towards his goals.   Pt prognosis is Good.     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    Pt's spiritual, cultural and educational needs considered and pt agreeable to plan of care and goals.     Anticipated Barriers for therapy: chronicity of current injury    Goals:   GOALS: Short Term Goals: 1 weeks  1. Pt will demo good TA muscle contraction for improved deep abdominal strength and lumbar stability. Not Met, Ongoing 7/29/2020   2. Pt will demo good sitting/standing posture and body mechanics for improved spine health and decreased risk of future injury. Not Met, Ongoing 7/29/2020   3. Pt to tolerate HEP to improve ROM and independence with ADL's. Not Met, Ongoing 7/29/2020      Long Term Goals: 3 weeks  1. Report decreased low back pain without radiculopathy to </= 0/10  to increase tolerance for return to work duties Not Met, Ongoing 7/29/2020   2. Increase strength to >/= 4+/5 MMT grade for BLE and core stability to increase tolerance for ADL and work activities. Not Met, Ongoing 7/29/2020   3. Pt to demonstrate negative SLR and/or Slump Test in order to show improved core strength and decreased nerve/dural tension. Not Met, Ongoing 7/29/2020   4. Patient's goal: Return to gainful employment. To get this resolved 100%. Not Met, Ongoing 7/29/2020   5. Pt will report at 27% impaired on FOTO lumbar score for low back pain disability to demonstrate decrease in disability and improvement in back pain. Not Met, Ongoing 7/29/2020   6. Increase lumbar ROM to WNL in order to improve  functional mobility. Not Met, Ongoing 7/29/2020   7. Pt will perform full floor to waist box lifts of 10# 20x with good form Not Met, Ongoing 7/29/2020      Pt will return to work full duty, full time.    Plan     Continue to advance PT towards established PT goals.     Plan of care Certification: 7/23/2020 to 8/14/2020.     Outpatient Physical Therapy 3 times weekly for 3 weeks to include the following interventions: Aquatic Therapy, Cervical/Lumbar Traction, Electrical Stimulation IFC/Russian, Gait Training, Manual Therapy, Moist Heat/ Ice, Neuromuscular Re-ed, Orthotic Management and Training, Patient Education, Self Care, Therapeutic Activites and Therapeutic Exercise.      Upon discharge from further skilled PT intervention it will determined if the need for a work conditioning program or Functional Capacity Evaluation is required to allow the injured worker to return to work with full potential achieved, continued improvement with body mechanics with advanced functional activities, and further prevention of future work-related injuries.       Jose Allen, PTA   7/29/2020

## 2020-08-03 ENCOUNTER — CLINICAL SUPPORT (OUTPATIENT)
Dept: REHABILITATION | Facility: HOSPITAL | Age: 30
End: 2020-08-03
Payer: OTHER GOVERNMENT

## 2020-08-03 DIAGNOSIS — M53.86 DECREASED ROM OF LUMBAR SPINE: ICD-10-CM

## 2020-08-03 DIAGNOSIS — M54.50 ACUTE BILATERAL LOW BACK PAIN WITHOUT SCIATICA: ICD-10-CM

## 2020-08-03 DIAGNOSIS — R53.1 DECREASED STRENGTH: ICD-10-CM

## 2020-08-03 PROCEDURE — 97110 THERAPEUTIC EXERCISES: CPT | Mod: PN

## 2020-08-03 NOTE — PROGRESS NOTES
Physical Therapy Daily Treatment Note     Name: Gurpreet Youngblood  Clinic Number: [id number]    Therapy Diagnosis:   Encounter Diagnoses   Name Primary?    Decreased ROM of lumbar spine     Decreased strength     Acute bilateral low back pain without sciatica      Physician: Lucia Arreola PA-C    Visit Date: 8/3/2020    Physician Orders: PT Eval and Treat: Dinorah Protocol/technique, Desensitization  Medical Diagnosis from Referral:  M51.86 (ICD-10-CM) - Other intervertebral disc disorders, lumbar region  Evaluation Date: 7/23/2020  Authorization Period Expiration: 10/7/2020  Plan of Care Expiration: 8/14/2020  Visit # / Visits authorized: 4/24    (# of No Show Appts 0/Number of Cancelled Appts 0)    Time In: 3:00 pm  Time Out: 3:45 pm  Total Appointment Time (timed & untimed codes): 45 minutes (3 TE)    Precautions: Standard    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  Job Demands: Normally require him to bend/lift/carry bags, pat people down, prolonged sitting  Current Work Restrictions: 10# restriction, sedentary duty, and a sturdy chair with lumbar support    Subjective   Pt is a 31 y/o male dx with Other intervertebral disc disorders, lumbar region presenting to PT at [facility] and [facility]. Pt currently presents with B low back pain, decreased cervical/lumbar ROM, decreased BLE and lumbar strength, impaired posture, and functional deficits with lifting, carrying, prolonged walking and standing. Pt would benefit from skilled PT consisting of muscular skeletal stretching/strengthening, manual therapy, neuro muscular re-education, and modalities prn to address limitations and increase functional mobility.     Pt reports: his back is doing better with no reports of increased pain throughout treatment session.  He was compliant with home exercise program.  Response to  previous treatment: No adverse response to eval  Functional change: None noted at this time as today is his first f/u    Pain: 2/10 general soreness.   Location: B mid Lower back straight across     Objective/Treatment     Gurpreet received therapeutic exercises to develop strength, endurance, ROM, flexibility and core stabilization for 35 minutes including:  - Prone press up x 20  - Prone hip extension x20  - Prone Supermans 3x10  - SL Plank hip abduction 1x15ea  - QP Bird Dogs 2x10  - Step up pull down BTB 1x15 each  - Standing repeated extension vs green peanut Swiss ball x30 - not today  - Seated Rows 10#  2x10 Cable   - Chest press with RTB 2x10 Both directions - not today  - Horizontal ABD    YTB     2x10  - Multifidi walks: RTB ankles; GTB UE   - 10# box lift from floor to waist to mat, 10x    Potentially planned  - Prone cobras   - Farmer carry  - Suitcase lifts  - Prone MWM lumbar flexion PRN    Gurpreet participated in dynamic functional therapeutic activities to improve functional performance for 0 minutes, including:  - NOT TODAY    Home Exercises Provided and Patient Education Provided     Education provided:   - encouraged safety with lifting mechanism    Written Home Exercises Provided: Patient instructed to cont prior HEP.  Exercises were reviewed and Gurpreet was able to demonstrate them prior to the end of the session.  Gurpreet demonstrated good  understanding of the education provided.     See EMR under Patient Instructions for exercises provided prior visit.    Assessment     Pt is a 31 y/o male dx with Other intervertebral disc disorders, lumbar region presenting to PT at [facility] and [facility]. He was referred with Malik Protocol/technique, Desensitization to the lumbar spine. Limited ankle DF flexibility noted during floor to mat crate lifts.  Pt presents with s/s consistent with referred Dx at this time.     Pt able to complete today's session with no reports of increased lower back  pain. Verbal instructions provided on postural awareness and technique correction for supine/ prone/ standing activities. Pt cooperative, reported no adverse reactions to session     The patient's current job specific task deficits include the following: prolonged standing    Gurpreet is progressing well towards his goals.   Pt prognosis is Good.     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    Pt's spiritual, cultural and educational needs considered and pt agreeable to plan of care and goals.     Anticipated Barriers for therapy: chronicity of current injury    Goals:   GOALS: Short Term Goals: 1 weeks  1. Pt will demo good TA muscle contraction for improved deep abdominal strength and lumbar stability. Not Met, Ongoing 8/3/2020   2. Pt will demo good sitting/standing posture and body mechanics for improved spine health and decreased risk of future injury. Not Met, Ongoing 8/3/2020   3. Pt to tolerate HEP to improve ROM and independence with ADL's. Not Met, Ongoing 8/3/2020      Long Term Goals: 3 weeks  1. Report decreased low back pain without radiculopathy to </= 0/10  to increase tolerance for return to work duties Not Met, Ongoing 8/3/2020   2. Increase strength to >/= 4+/5 MMT grade for BLE and core stability to increase tolerance for ADL and work activities. Not Met, Ongoing 8/3/2020   3. Pt to demonstrate negative SLR and/or Slump Test in order to show improved core strength and decreased nerve/dural tension. Not Met, Ongoing 8/3/2020   4. Patient's goal: Return to gainful employment. To get this resolved 100%. Not Met, Ongoing 8/3/2020   5. Pt will report at 27% impaired on FOTO lumbar score for low back pain disability to demonstrate decrease in disability and  improvement in back pain. Not Met, Ongoing 8/3/2020   6. Increase lumbar ROM to WNL in order to improve functional mobility. Not Met, Ongoing 8/3/2020   7. Pt will perform full floor to waist box lifts of 10# 20x with good form Not Met, Ongoing 8/3/2020      Pt will return to work full duty, full time.    Plan     Continue to advance PT towards established PT goals.     Plan of care Certification: 7/23/2020 to 8/14/2020.     Outpatient Physical Therapy 3 times weekly for 3 weeks to include the following interventions: Aquatic Therapy, Cervical/Lumbar Traction, Electrical Stimulation IFC/Russian, Gait Training, Manual Therapy, Moist Heat/ Ice, Neuromuscular Re-ed, Orthotic Management and Training, Patient Education, Self Care, Therapeutic Activites and Therapeutic Exercise.      Upon discharge from further skilled PT intervention it will determined if the need for a work conditioning program or Functional Capacity Evaluation is required to allow the injured worker to return to work with full potential achieved, continued improvement with body mechanics with advanced functional activities, and further prevention of future work-related injuries.       Darinel Rabago, PT   8/3/2020

## 2020-08-05 ENCOUNTER — CLINICAL SUPPORT (OUTPATIENT)
Dept: REHABILITATION | Facility: HOSPITAL | Age: 30
End: 2020-08-05
Payer: OTHER GOVERNMENT

## 2020-08-05 DIAGNOSIS — M54.50 ACUTE BILATERAL LOW BACK PAIN WITHOUT SCIATICA: ICD-10-CM

## 2020-08-05 DIAGNOSIS — R53.1 DECREASED STRENGTH: ICD-10-CM

## 2020-08-05 DIAGNOSIS — M53.86 DECREASED ROM OF LUMBAR SPINE: ICD-10-CM

## 2020-08-05 PROCEDURE — 97110 THERAPEUTIC EXERCISES: CPT | Mod: PN,CQ

## 2020-08-05 NOTE — PROGRESS NOTES
"Physical Therapy Daily Treatment Note     Name: Gurpreet Youngblood  Clinic Number: [id number]    Therapy Diagnosis:   Encounter Diagnoses   Name Primary?    Decreased ROM of lumbar spine     Decreased strength     Acute bilateral low back pain without sciatica      Physician: Lucia Arreola PA-C    Visit Date: 8/5/2020    Physician Orders: PT Eval and Treat: Dinorah Protocol/technique, Desensitization  Medical Diagnosis from Referral:  M51.86 (ICD-10-CM) - Other intervertebral disc disorders, lumbar region  Evaluation Date: 7/23/2020  Authorization Period Expiration: 10/7/2020  Plan of Care Expiration: 8/14/2020  Visit # / Visits authorized: 5/24  FOTO: 5/10 (completed)    (# of No Show Appts 0/Number of Cancelled Appts 0)    Time In: 3:20 pm  Time Out: 4:05 pm  Total Appointment Time (timed & untimed codes): 45 minutes (3 TE)    Precautions: Standard    Subjective     Pt reports: "Feeling fine no pain at all" pt agreeable to treatment  He was compliant with home exercise program.  Response to previous treatment: no adverse reactions  Functional change: no increased pain    Pain: 0/10 .   Location: B mid Lower back straight across     Objective/Treatment     Gurpreet received therapeutic exercises to develop strength, endurance, ROM, flexibility and core stabilization for 45 minutes including:  - Prone press up x 20  - Prone hip extension x20  - Prone Supermans 3x10  - SL Plank hip abduction 1x15ea  - QP Bird Dogs 2x10  - Step up pull down BTB 1x15 each (not performed)  - Standing repeated extension vs green peanut Swiss ball x30 - (not performed)  - Seated Rows 10#  2x10 Cable   - Chest press with RTB 2x10 Both directions  - Horizontal ABD    YTB     2x10 (not performed)  - Multifidi walks: RTB ankles; GTB UE   - 10# box lift from floor to waist to mat, 10x  - cybex hip ext 2x10 3 plates    Potentially planned  - Prone cobras   - Farmer carry  - Suitcase lifts  - Prone MWM lumbar flexion PRN    Gurpreet " participated in dynamic functional therapeutic activities to improve functional performance for 0 minutes, including:  - NOT TODAY    Home Exercises Provided and Patient Education Provided     Education provided:   - encouraged safety with lifting mechanism    Written Home Exercises Provided: Patient instructed to cont prior HEP.  Exercises were reviewed and Gurpreet was able to demonstrate them prior to the end of the session.  Gurpreet demonstrated good  understanding of the education provided.     See EMR under Patient Instructions for exercises provided prior visit.    Assessment     Pt able to complete today's session with no reports of increased lower back pain. Verbal instructions provided on postural awareness and technique correction for supine, seated, & standing activities.Gurpreet reported no adverse reactions to session     Gurpreet is progressing well towards his goals.   Pt prognosis is Good.     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    Pt's spiritual, cultural and educational needs considered and pt agreeable to plan of care and goals.     Anticipated Barriers for therapy: chronicity of current injury    Goals:   GOALS: Short Term Goals: 1 weeks  1. Pt will demo good TA muscle contraction for improved deep abdominal strength and lumbar stability. Not Met, Ongoing 8/5/2020   2. Pt will demo good sitting/standing posture and body mechanics for improved spine health and decreased risk of future injury. Not Met, Ongoing 8/5/2020   3. Pt to tolerate HEP to improve ROM and independence with ADL's. Not Met, Ongoing 8/5/2020      Long Term Goals: 3 weeks  1. Report decreased low back pain without radiculopathy to </= 0/10  to increase tolerance for return to work duties Not  Met, Ongoing 8/5/2020   2. Increase strength to >/= 4+/5 MMT grade for BLE and core stability to increase tolerance for ADL and work activities. Not Met, Ongoing 8/5/2020   3. Pt to demonstrate negative SLR and/or Slump Test in order to show improved core strength and decreased nerve/dural tension. Not Met, Ongoing 8/5/2020   4. Patient's goal: Return to gainful employment. To get this resolved 100%. Not Met, Ongoing 8/5/2020   5. Pt will report at 27% impaired on FOTO lumbar score for low back pain disability to demonstrate decrease in disability and improvement in back pain. Not Met, Ongoing 8/5/2020   6. Increase lumbar ROM to WNL in order to improve functional mobility. Not Met, Ongoing 8/5/2020   7. Pt will perform full floor to waist box lifts of 10# 20x with good form Not Met, Ongoing 8/5/2020      Pt will return to work full duty, full time.    Plan     Continue to advance PT towards established PT goals.     Plan of care Certification: 7/23/2020 to 8/14/2020.     Outpatient Physical Therapy 3 times weekly for 3 weeks to include the following interventions: Aquatic Therapy, Cervical/Lumbar Traction, Electrical Stimulation IFC/Russian, Gait Training, Manual Therapy, Moist Heat/ Ice, Neuromuscular Re-ed, Orthotic Management and Training, Patient Education, Self Care, Therapeutic Activites and Therapeutic Exercise.      Upon discharge from further skilled PT intervention it will determined if the need for a work conditioning program or Functional Capacity Evaluation is required to allow the injured worker to return to work with full potential achieved, continued improvement with body mechanics with advanced functional activities, and further prevention of future work-related injuries.     Today's PT session conducted by Gwen GOODWIN, under direct supervision of clinical instructor,   Jose Allen, PTA, CCI  8/5/2020

## 2020-08-07 ENCOUNTER — CLINICAL SUPPORT (OUTPATIENT)
Dept: REHABILITATION | Facility: HOSPITAL | Age: 30
End: 2020-08-07
Payer: OTHER GOVERNMENT

## 2020-08-07 DIAGNOSIS — M54.50 ACUTE BILATERAL LOW BACK PAIN WITHOUT SCIATICA: ICD-10-CM

## 2020-08-07 DIAGNOSIS — M53.86 DECREASED ROM OF LUMBAR SPINE: ICD-10-CM

## 2020-08-07 DIAGNOSIS — R53.1 DECREASED STRENGTH: ICD-10-CM

## 2020-08-07 PROCEDURE — 97110 THERAPEUTIC EXERCISES: CPT | Mod: PN

## 2020-08-07 NOTE — PROGRESS NOTES
"Physical Therapy Daily Treatment Note     [facility]  [facility] [id number]    Therapy Diagnosis:   Encounter Diagnoses   Name Primary?    Decreased ROM of lumbar spine     Decreased strength     Acute bilateral low back pain without sciatica      Physician: Lucia Arreola PA-C    Visit Date: 8/7/2020    Physician Orders: PT Eval and Treat: Dinorah Protocol/technique, Desensitization  Medical Diagnosis from Referral:  M51.86 (ICD-10-CM) - Other intervertebral disc disorders, lumbar region  Evaluation Date: 7/23/2020  Authorization Period Expiration: 10/7/2020  Plan of Care Expiration: 8/14/2020  Visit # / Visits authorized: 7/24  FOTO: 7/10    (# of No Show Appts 0/Number of Cancelled Appts 0)    Time In: 1:05 pm  Time Out: 1:45 pm  Total Appointment Time (timed & untimed codes): 40 minutes (3 TE)    Precautions: Standard    Subjective     Pt reports: He can now do more standing activities at work rotating from standing to sitting. 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 Feeling good today overall and no complaints of pain.  He was compliant with home exercise program.  Response to previous treatment: muscle soreness (DOMS)  Functional change: no increased pain    Pain: 0/10 .   Location: B mid Lower back straight across     Objective/Treatment     Gurpreet received therapeutic exercises to develop strength, endurance, ROM, flexibility and core stabilization for 45 minutes including:  - Prone press up x 20  - SL Plank hip abduction 2x10 ea  - Plank on elbows 30", 25", 20", 15"   - QP Bird Dogs 2x10  - Step up with march and contra pull down Black TB: blue step with airex, 2x10 B  - Prone cobras: 3x4 (PT holding legs and chair in front of patient for breaks/safety)  - Seated Rows 10#  2x10 Cable - not performed today  - Half kneeing chest press with GTB- 2x10 Both directions  - Horizontal ABD, YTB 2x10 (not performed)  - Multifidi " walks: RTB ankles; GTB UE; 40'x2  - 10# box lift from floor to waist to mat, 2x10  - Cybex hip ext 2x10 3 plates    Potentially planned  - Farmer carry  - Prone MWM lumbar flexion PRN    Gurpreet participated in dynamic functional therapeutic activities to improve functional performance for 0 minutes, including:  - NOT TODAY    Home Exercises Provided and Patient Education Provided   Education provided:   - encouraged safety with lifting mechanism    Written Home Exercises Provided: Patient instructed to cont prior HEP.  Exercises were reviewed and Gurpreet was able to demonstrate them prior to the end of the session.  Gurpreet demonstrated good  understanding of the education provided.     See EMR under Patient Instructions for exercises provided prior visit.    Assessment     Added and increased lumbar/core exercises today with pt demonstrating mild difficulty though no pain. Pt progressing very well did especially well with prone lumbar extensions off the mat. Abiding by MD lifting restrictions for functional training, and pt likely lifting weight limits can be progressed more per MD orders. Will see how this week of work goes with more standing activities go beofre contacting MD about change. No low back pain throughout session.    Gurpreet is progressing well towards his goals.   Pt prognosis is Good.     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    Pt's spiritual, cultural and educational needs considered and pt agreeable to plan of care and goals.     Anticipated Barriers for therapy: chronicity of current injury    Goals:   GOALS: Short Term Goals: 1 weeks  1. Pt will demo good TA muscle contraction for improved deep abdominal strength and lumbar stability. Not Met, Ongoing  8/7/2020   2. Pt will demo good sitting/standing posture and body mechanics for improved spine health and decreased risk of future injury. Not Met, Ongoing 8/7/2020   3. Pt to tolerate HEP to improve ROM and independence with ADL's. Not Met, Ongoing 8/7/2020      Long Term Goals: 3 weeks  1. Report decreased low back pain without radiculopathy to </= 0/10  to increase tolerance for return to work duties Not Met, Ongoing 8/7/2020   2. Increase strength to >/= 4+/5 MMT grade for BLE and core stability to increase tolerance for ADL and work activities. Not Met, Ongoing 8/7/2020   3. Pt to demonstrate negative SLR and/or Slump Test in order to show improved core strength and decreased nerve/dural tension. Not Met, Ongoing 8/7/2020   4. Patient's goal: Return to gainful employment. To get this resolved 100%. Not Met, Ongoing 8/7/2020   5. Pt will report at 27% impaired on FOTO lumbar score for low back pain disability to demonstrate decrease in disability and improvement in back pain. Not Met, Ongoing 8/7/2020   6. Increase lumbar ROM to WNL in order to improve functional mobility. Not Met, Ongoing 8/7/2020   7. Pt will perform full floor to waist box lifts of 10# 20x with good form Not Met, Ongoing 8/7/2020      Pt will return to work full duty, full time.    Plan     Continue to advance PT towards established PT goals.     Plan of care Certification: 7/23/2020 to 8/14/2020.     Outpatient Physical Therapy 3 times weekly for 3 weeks to include the following interventions: Aquatic Therapy, Cervical/Lumbar Traction, Electrical Stimulation IFC/Russian, Gait Training, Manual Therapy, Moist Heat/ Ice, Neuromuscular Re-ed, Orthotic Management and Training, Patient Education, Self Care, Therapeutic Activites and Therapeutic Exercise.      Upon discharge from further skilled PT intervention it will determined if the need for a work conditioning program or Functional Capacity Evaluation is required to allow the injured worker to  return to work with full potential achieved, continued improvement with body mechanics with advanced functional activities, and further prevention of future work-related injuries.     Today's PT session conducted by Gwen GOODWIN, under direct supervision of clinical instructor,   Dagoberto Ambriz, PT, CCI  8/7/2020

## 2020-08-12 ENCOUNTER — CLINICAL SUPPORT (OUTPATIENT)
Dept: REHABILITATION | Facility: HOSPITAL | Age: 30
End: 2020-08-12
Payer: OTHER GOVERNMENT

## 2020-08-12 DIAGNOSIS — R53.1 DECREASED STRENGTH: ICD-10-CM

## 2020-08-12 DIAGNOSIS — M54.50 ACUTE BILATERAL LOW BACK PAIN WITHOUT SCIATICA: ICD-10-CM

## 2020-08-12 DIAGNOSIS — M53.86 DECREASED ROM OF LUMBAR SPINE: ICD-10-CM

## 2020-08-12 PROCEDURE — 97110 THERAPEUTIC EXERCISES: CPT | Mod: PN

## 2020-08-12 NOTE — PROGRESS NOTES
"Physical Therapy Daily Treatment Note     [facility]  [facility] Number: [id number]    Therapy Diagnosis:   Encounter Diagnoses   Name Primary?    Decreased ROM of lumbar spine     Decreased strength     Acute bilateral low back pain without sciatica      Physician: Lucia Arreola PA-C    Visit Date: 8/12/2020    Physician Orders: PT Eval and Treat: Dinorah Protocol/technique, Desensitization  Medical Diagnosis from Referral:  M51.86 (ICD-10-CM) - Other intervertebral disc disorders, lumbar region  Evaluation Date: 7/23/2020  Authorization Period Expiration: 10/7/2020  Plan of Care Expiration: 8/14/2020  Visit # / Visits authorized: 8/24  FOTO: 8/10    (# of No Show Appts 0/Number of Cancelled Appts 0)    Time In: 7:34 am  Time Out: 8:14 am  Total Appointment Time (timed & untimed codes): 40 minutes (3 TE)    Precautions: Standard    Subjective     Pt reports: his low back is still feeling good and there's nothing new with work. Patient still experiences some muscle soreness, but it wasn't too bad.   He was compliant with home exercise program.  Response to previous treatment: muscle soreness (DOMS)  Functional change: no increased pain    Pain: 0/10 .   Location: B mid Lower back straight across     Objective/Treatment     Gurpreet received therapeutic exercises to develop strength, endurance, ROM, flexibility and core stabilization for 40 minutes including:  - Prone press up x 20  - SL Plank hip abduction 2x10 ea  - Plank on elbows 30", 25", 20", 15"   - QP Bird Dogs 2x10  - Step up with march and contra pull down Black TB: blue step with airex, 2x10 B  - Prone Supermans: 2x10  - Prone cobras: 3x4 (PT holding legs and chair in front of patient for breaks/safety) - not today  - Seated unilateral rows 10#  2x10 Cable - not performed today  - Half kneeing chest press with GTB- 2x10 Both directions  - Multifidi walks: RTB ankles; GTB UE; 40'x2  - 10# box lift from floor to waist to mat, 2x10  - Cybex " Interventional Radiology Consult Service Note    Patient Name:  José Aguirre   YOB: 1934  Medical Record Number (MRN):  [id number]  Age:  84 year old male  -----    Complete Blood Count:  Lab Results   Component Value Date    PLT 94 09/05/2019       Coagulation:  Lab Results   Component Value Date    INR 1.40 08/31/2019       -----  Reason for Consult bilateral thoracentesis on 83 yo M with COPD inability to wean off high flow O2 with bilateral pleural effusions on DAPT for recent cardiac arrest     Is the patient on an anticoagulant ? Yes   Specify asa and plavix     Ordered By   9/5/2019  7:43 AM Jenni Smith MD  Call back # [phone]   ---  sent page to [phone] - re: José Aguirre. MRN: [id number]. Please consult CAPS service for desired thoras. IR HÉCTOR Bruno [phone]    team wishes IR to do due to increased risk from procedure. cg    ---  Patient will be placed on today's IR add-on schedule for: Bilateral thoracenteses     Pre-procedure labs (i.e., plts, INR) are within IR protocol for planned procedure.    IR procedural order and pre-op orders have been entered.   No n.p.o. status required, no sedation planned.      Procedural and sedation informed consent will be done prior to procedure.     You may contact the IR control desk/charge RN at [phone] for an estimated time of procedure.     Case discussed with referring and IR Dr. Justin.      CASSIA Burnham, PAFrancheskaC  Physician Assistant - Certified  Interventional Radiology    [phone] (IR control desk)  [phone] (IR on-call pager)           hip ext 2x10 4 plates    Potentially planned  - Farmer carry  - Prone MWM lumbar flexion PRN    Gurpreet participated in dynamic functional therapeutic activities to improve functional performance for 0 minutes, including:  - NOT TODAY    Home Exercises Provided and Patient Education Provided   Education provided:   - encouraged safety with lifting mechanism    Written Home Exercises Provided: Patient instructed to cont prior HEP.  Exercises were reviewed and Gurpreet was able to demonstrate them prior to the end of the session.  Gurpreet demonstrated good  understanding of the education provided.     See EMR under Patient Instructions for exercises provided prior visit.    Assessment     Patient tolerated lumbar/core exercises today well with some mild difficulty; although, he had no pain. Pt is showing improvements with core stability exercises displaying better form, and he continues to have no issues with floor to mat transferring 10# per restrictions. No low back pain throughout session.    Gurpreet is progressing well towards his goals.   Pt prognosis is Good.     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    Pt's spiritual, cultural and educational needs considered and pt agreeable to plan of care and goals.     Anticipated Barriers for therapy: chronicity of current injury    Goals:   GOALS: Short Term Goals: 1 weeks  1. Pt will demo good TA muscle contraction for improved deep abdominal strength and lumbar stability. Not Met, Ongoing 8/12/2020   2. Pt will demo good sitting/standing posture and body mechanics for improved spine health and decreased risk of future injury. Not Met, Ongoing 8/12/2020   3. Pt to tolerate HEP to improve ROM and independence with ADL's. Not Met,  Ongoing 8/12/2020      Long Term Goals: 3 weeks  1. Report decreased low back pain without radiculopathy to </= 0/10  to increase tolerance for return to work duties Not Met, Ongoing 8/12/2020   2. Increase strength to >/= 4+/5 MMT grade for BLE and core stability to increase tolerance for ADL and work activities. Not Met, Ongoing 8/12/2020   3. Pt to demonstrate negative SLR and/or Slump Test in order to show improved core strength and decreased nerve/dural tension. Not Met, Ongoing 8/12/2020   4. Patient's goal: Return to gainful employment. To get this resolved 100%. Not Met, Ongoing 8/12/2020   5. Pt will report at 27% impaired on FOTO lumbar score for low back pain disability to demonstrate decrease in disability and improvement in back pain. Not Met, Ongoing 8/12/2020   6. Increase lumbar ROM to WNL in order to improve functional mobility. Not Met, Ongoing 8/12/2020   7. Pt will perform full floor to waist box lifts of 10# 20x with good form Not Met, Ongoing 8/12/2020      Pt will return to work full duty, full time.    Plan     Continue to advance PT towards established PT goals.     Plan of care Certification: 7/23/2020 to 8/14/2020.     Outpatient Physical Therapy 3 times weekly for 3 weeks to include the following interventions: Aquatic Therapy, Cervical/Lumbar Traction, Electrical Stimulation IFC/Russian, Gait Training, Manual Therapy, Moist Heat/ Ice, Neuromuscular Re-ed, Orthotic Management and Training, Patient Education, Self Care, Therapeutic Activites and Therapeutic Exercise.      Upon discharge from further skilled PT intervention it will determined if the need for a work conditioning program or Functional Capacity Evaluation is required to allow the injured worker to return to work with full potential achieved, continued improvement with body mechanics with advanced functional activities, and further prevention of future work-related injuries.     Today's PT session conducted by Gwen Layton  CHRISTIANAA, under direct supervision of clinical instructor,   Darinel Rabago, PT, CCI  8/12/2020

## 2020-08-14 ENCOUNTER — CLINICAL SUPPORT (OUTPATIENT)
Dept: REHABILITATION | Facility: HOSPITAL | Age: 30
End: 2020-08-14
Payer: OTHER GOVERNMENT

## 2020-08-14 DIAGNOSIS — M54.50 ACUTE BILATERAL LOW BACK PAIN WITHOUT SCIATICA: ICD-10-CM

## 2020-08-14 DIAGNOSIS — R53.1 DECREASED STRENGTH: ICD-10-CM

## 2020-08-14 DIAGNOSIS — M53.86 DECREASED ROM OF LUMBAR SPINE: ICD-10-CM

## 2020-08-14 PROCEDURE — 97110 THERAPEUTIC EXERCISES: CPT | Mod: PN

## 2020-08-14 NOTE — PROGRESS NOTES
"Physical Therapy Daily Treatment Note     [facility]  [facility] Number[facility] [id number]    Therapy Diagnosis:   Encounter Diagnoses   Name Primary?    Decreased ROM of lumbar spine     Decreased strength     Acute bilateral low back pain without sciatica      Physician: Lucia Arreola PA-C    Visit Date: 8/14/2020    Physician Orders: PT Eval and Treat: Dinorah Protocol/technique, Desensitization  Medical Diagnosis from Referral:  M51.86 (ICD-10-CM) - Other intervertebral disc disorders, lumbar region  Evaluation Date: 7/23/2020  Authorization Period Expiration: 10/7/2020  Plan of Care Expiration: 9/4/2020 (updated today)  Visit # / Visits authorized: 9/24  FOTO: 9/10    (# of No Show Appts 0/Number of Cancelled Appts 0)     Time In: 1300   Time Out: 1345  Total Appointment Time (timed & untimed codes): 45 minutes (3 TE)    Precautions: Standard    Subjective     Pt reports: his low back is still feeling good and there have been no changes with work or with weight lifting restrictions from MD.   He was compliant with home exercise program.  Response to previous treatment: muscle soreness (DOMS)  Functional change: no increased pain    Pain: 0/10 .   Location: B mid Lower back straight across     Objective/Treatment     Gurpreet received therapeutic exercises to develop strength, endurance, ROM, flexibility and core stabilization for 45 minutes including:  - Prone press up         x 30  - SL Plank hip abduction (bend bottom leg)     2x10 ea  - Plank on elbows         4 x 30"  - Prone Supermans:         2x10, 3" hold  - Prone cobras: (PT holding legs and chair in front of patient for breaks/safety) - 3 x 4    - Step up with march and contra pull down Black TB:   blue step with airex, 2x10 B  - Rows in contralateral SLS on Airex     GTB 3x10 (single arm)  - Half kneeling chops/lifts        GTB 10x B (single arm)  - Multifidi walks:         RTB ankles; GTB UE; 40'x2  - Calf stretcher focus on soleus      3 x " "30"- not done today, new next time  - 10# box lift from floor to waist to mat     2x10 - not done today, new next time  - Cybex hip ext         2x10 4 plates- not done today  - Newton carry        #10 3 continuous laps around gym for each hand    Gurpreet participated in dynamic functional therapeutic activities to improve functional performance for 0 minutes, including:  - NOT TODAY    Home Exercises Provided and Patient Education Provided   Education provided:   - encouraged safety with lifting mechanism    Written Home Exercises Provided: Patient instructed to cont prior HEP.  Exercises were reviewed and Gurpreet was able to demonstrate them prior to the end of the session.  Gurpreet demonstrated good  understanding of the education provided.     See EMR under Patient Instructions for exercises provided prior visit.    Assessment   Pt tolerated progression of therapy well with mild difficulty and no complaints of pain, only fatigue. He requires cues for GM activation in all SLS. Also, when performing SL bridges, bending bottom leg allowed pt to have better GM activation in both directions. He is showing continued improvements in core stability, balance, and strength. Today he completed farmer's carries as part of functional training for work duties while still under 10# restriction. Pt performed this with no difficulty or discomfort. Pt appears able to tolerate greater loads. PT will contact MD about altering lifting precautions to allowed continued progress.     Gurpreet is progressing well towards his goals.   Pt prognosis is Good.     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    Pt's spiritual, cultural and educational needs considered and pt agreeable to plan of care and goals.   "   Anticipated Barriers for therapy: chronicity of current injury    Goals: See UPOC     Pt will return to work full duty, full time.    Plan     Continue to advance PT towards established PT goals.     Plan of care Certification: 8/14/2020 to 9/4/2020     Outpatient Physical Therapy 3 times weekly for 3 weeks to include the following interventions: Aquatic Therapy, Cervical/Lumbar Traction, Electrical Stimulation IFC/Russian, Gait Training, Manual Therapy, Moist Heat/ Ice, Neuromuscular Re-ed, Orthotic Management and Training, Patient Education, Self Care, Therapeutic Activites and Therapeutic Exercise.      Upon discharge from further skilled PT intervention it will determined if the need for a work conditioning program or Functional Capacity Evaluation is required to allow the injured worker to return to work with full potential achieved, continued improvement with body mechanics with advanced functional activities, and further prevention of future work-related injuries.     Nolvia Barker, SPT  8/14/2020    I certify that I was present in the room directing the student in service delivery and guiding them using my skilled judgment. As the co-signing therapist I have reviewed the students documentation and am responsible for the treatment, assessment, and plan.   Dagoberto Ambriz, PT, DPT

## 2020-08-14 NOTE — PLAN OF CARE
Outpatient Therapy Updated Plan of Care     Visit Date: 8/14/2020  Name: Gurpreet Youngblood  Clinic Number: [id number]    Therapy Diagnosis:   Encounter Diagnoses   Name Primary?    Decreased ROM of lumbar spine     Decreased strength     Acute bilateral low back pain without sciatica      Physician: Lucia Arreola PA-C    Physician Orders: PT Eval and Treat: Dinorah Protocol/technique, Desensitization  Medical Diagnosis from Referral:  M51.86 (ICD-10-CM) - Other intervertebral disc disorders, lumbar region  Evaluation Date: 7/23/2020  Authorization Period Expiration: 10/7/2020  Plan of Care Expiration: 8/14/2020  Visit # / Visits authorized: 9/24    Total Visits Received: 9  Cancelled Visits: 0  No Show Visits: 0    Current Certification Period:  7/23/2020 to 8/14/2020  Precautions:  Standard  Visits from Evaluation Date:  8  Functional Level Prior to Evaluation:  Limited standing and lifting due to LBP    Subjective     Update: Pt reports no pain or limitations with any functional activities at home or at work. He also reported having no difficulties with his modified duties at work.     Objective     Update: Pt completed all of the below without pain:     Functional Job Specific Testing: Current lifting and carrying restrictions set at 10#  - 10# box lift from waist height mat, 20x   - Carrying a 10# weight for 420 ft in his R hand  and 420ft in his L hand     Job Specific Task Job Demands Current Ability Deficit? (Yes or No)   1. Lifting/bending occassionally  Yes No   2. Standing  occassionally  Yes No       A/PROM and MMT:  * = low back pain with testing  NT = Not tested  AROM: (degrees) LUMBAR   Flexion (40-50) 100%   Extension (15-20) 100%   Right side bending (~20) 100%   Left side bending  (~20) 100%   Right rotation (5-10) 100%   Left rotation (5-10) 100%      Hip   Right     Left   Pain/Dysfunction with Movement     AROM PROM MMT AROM PROM MMT     Flexion (L2,120 deg) WFL WFL 5/5 WFL WFL 5/5    "  Extension (20 deg) WFL WFL 4/5 WFL WFL 4/5     Abduction (L5, 45 deg) WFL WFL 5/5 WFL WFL 5/5     Adduction (30 deg) WFL WFL 5/5 WFL WFL 5/5     IR (30 deg) WFL WFL 5/5 WFL WFL 5/5     ER (45 deg) WFL WFL 5/5 WFL WFL 5/5        Knee   Right     Left   Pain/Dysfunction with Movement     AROM PROM MMT AROM PROM MMT     Flexion (S2, 140 deg) WFL WFL 5/5 WFL WFL 5/5     Extension (L3, 0-5 deg) WFL WFL 5/5 WFL WFL 5/5        Ankle   Right     Left   Pain/Dysfunction with Movement     AROM PROM MMT AROM PROM MMT     Dorsiflexion (L4, 20 deg) WFL WFL 5/5 WFL WFL 5/5     Plantarflexion (S1, 50 deg) WFL WFL 5/5 WFL WFL 5/5     Inversion (20-30 deg) WFL WFL 5/5 WFL WFL 5/5     Eversion (5-10 deg) WFL WFL 5/5 WFL WFL 5/5     Great toe extension (L5, 70 deg) WFL WFL 5/5 WFL WFL 5/5        Lumbar Tests:  SLR Test (L4-S3) = negative B  Plank time: 30'' max  Prone lumbar ext endurance test: pt can hold at least 5 "- will retest in future for extended endurance    Assessment     Update: Pt has progressed well through all exercise progressions including functional training specific to his job. He has had no complaints of pain for last several weeks in any activities only some fatigue. He has shown significant improvements in strength, ROM, endurance, balance, and core activation. Pt has returned to modified duty at work only limited by a 10# lifting restriction. In order to progress this patient further it would beneficial to increase the lifting restrictions. To this end, PT will contact MD to address the possibility of altering precautions to further progress functional training.    Previous Short/Long Term Goals Status:   Short Term Goals: 1 weeks  1. Pt will demo good TA muscle contraction for improved deep abdominal strength and lumbar stability. Met 8/14/2020   2. Pt will demo good sitting/standing posture and body mechanics for improved spine health and decreased risk of future injury. Met 8/14/2020   3. Pt to tolerate HEP " to improve ROM and independence with ADL's. Met  8/14/2020      Long Term Goals: 3 weeks  1. Report decreased low back pain without radiculopathy to </= 0/10  to increase tolerance for return to work duties Met  8/14/2020   2. Increase strength to >/= 4+/5 MMT grade for BLE and core stability to increase tolerance for ADL and work activities. Not Met, Ongoing 8/14/2020   3. Pt to demonstrate negative SLR and/or Slump Test in order to show improved core strength and decreased nerve/dural tension. Met 8/14/2020   4. Patient's goal: Return to gainful employment. To get this resolved 100%. Not Met, Ongoing 8/14/2020   5. Pt will report at 27% impaired on FOTO lumbar score for low back pain disability to demonstrate decrease in disability and improvement in back pain. Met  8/5/2020   6. Increase lumbar ROM to WNL in order to improve functional mobility. Met 8/14/2020   7. Pt will perform full floor to waist box lifts of 10# 20x with good form Met 8/14/2020     New Long Term Goals Status:    Short Term Goals: 1 Week  1. Pt will increase lifting restriction per MD orders for continued functional training.   2. Pt will be able to maintain forearm plank for 1 full min to improve core endurance to facilitate lumbar spine support in extended standing activities.     Long Term Goals: 3 weeks  1. Increase strength to >/= 5/5 MMT grade for BLE and core stability to increase tolerance for ADL and work activities. Not Met, Ongoing 8/14/2020, updated   2. Patient's goal: Return to gainful employment. To get this resolved 100%. Not Met, Ongoing 8/14/2020  3. Pt will perform prone lumbar extension endurance test for 1 min to improve endurance of lumbar musculature to decrease risk of pain in prolonged standing.   4. Pt will lift 50# box from floor to waist height table 10x to facilitate return to full duty employment, pending MD altering lifting restrictions.  5. Pt will carry 25# box 140 ft to simulate full duty work activities   6.  "Pt will perform 20 reps of 25# "chops" in half kneeling position on Airex pad to improve kneeling balance for return to work activities.     Reasons for Recertification of Therapy:   Update plan of care    Plan     Updated Certification Period: 8/14/2020 to 9/4/2020  Recommended Treatment Plan: 3 times per week for 3 weeks: Cervical/Lumbar Traction, Gait Training, Manual Therapy, Moist Heat/ Ice, Neuromuscular Re-ed, Patient Education, Therapeutic Activites and Therapeutic Exercise  Other Recommendations: Request MD to progress lifting restrictions to higher weight to allow for further functional training    Nolvia Barker, SPT  8/14/2020     I certify that I was present in the room directing the student in service delivery and guiding them using my skilled judgment. As the co-signing therapist I have reviewed the students documentation and am responsible for the treatment, assessment, and plan.   Dagoberto Ambriz, PT, DPT      I CERTIFY THE NEED FOR THESE SERVICES FURNISHED UNDER THIS PLAN OF TREATMENT AND WHILE UNDER MY CARE    Physician's comments:        Physician's Signature: ___________________________________________________    "

## 2020-08-19 ENCOUNTER — CLINICAL SUPPORT (OUTPATIENT)
Dept: REHABILITATION | Facility: HOSPITAL | Age: 30
End: 2020-08-19
Payer: OTHER GOVERNMENT

## 2020-08-19 DIAGNOSIS — M53.86 DECREASED ROM OF LUMBAR SPINE: ICD-10-CM

## 2020-08-19 DIAGNOSIS — M54.50 ACUTE BILATERAL LOW BACK PAIN WITHOUT SCIATICA: ICD-10-CM

## 2020-08-19 DIAGNOSIS — R53.1 DECREASED STRENGTH: ICD-10-CM

## 2020-08-19 PROCEDURE — 97110 THERAPEUTIC EXERCISES: CPT | Mod: PN

## 2020-08-19 NOTE — PROGRESS NOTES
"Physical Therapy Daily Treatment Note     [facility]  [facility] [id number]    Therapy Diagnosis:   Encounter Diagnoses   Name Primary?    Decreased ROM of lumbar spine     Decreased strength     Acute bilateral low back pain without sciatica      Physician: Andrew Aguirre Jr.,*    Visit Date: 8/19/2020    Physician Orders: PT Eval and Treat: Dinorah Protocol/technique, Desensitization  Medical Diagnosis from Referral:  M51.86 (ICD-10-CM) - Other intervertebral disc disorders, lumbar region  Evaluation Date: 7/23/2020  Authorization Period Expiration: 10/7/2020  Plan of Care Expiration: 9/4/2020   Visit # / Visits authorized: 10/24  FOTO: 10/10 - done 8/19/2020    (# of No Show Appts 0/Number of Cancelled Appts 0)     Time In: 1:30 pm   Time Out: 2:15 pm  Total Appointment Time (timed & untimed codes): 45 minutes (3 TE)    Precautions: Standard    Subjective     Pt reports: his low back is still feeling good and there have been no changes with work or with weight lifting restrictions from MD.   He was compliant with home exercise program.  Response to previous treatment: muscle soreness (DOMS)  Functional change: no increased pain    Pain: 0/10 .   Location: B mid Lower back straight across     Objective/Treatment     Gurpreet received therapeutic exercises to develop strength, endurance, ROM, flexibility and core stabilization for 45 minutes including:  - Prone press up         x 30  - SL Plank hip abduction (bend bottom leg)     2x10 ea  - Prone Supermans:         2x10  - Nordic HS curls with swiss ball assistance    10 x  - Plank with elbows on blue swiss ball     10" x 6  - SAPD in contralateral SLS on Airex      3x10 - Double BTB   - Half kneeling chops        15x - BTB  - Multifidi walks:         RTB ankles; GTB UE; 40'x2  - Soleus fitter stretch       10" x 10  - 10# box lift from floor to waist to mat     2x10 - not done today  - Cybex hip ext         2x10 4 plates- not done today  - " Newton carry        #10 - 2 laps in (L) UE, 2 laps in (R) UE      Home Exercises Provided and Patient Education Provided   Education provided:   - encouraged safety with lifting mechanism    Written Home Exercises Provided: Patient instructed to cont prior HEP.  Exercises were reviewed and Gurpreet was able to demonstrate them prior to the end of the session.  Gurpreet demonstrated good  understanding of the education provided.     See EMR under Patient Instructions for exercises provided prior visit.    Assessment   Pt tolerated progression of therapy well with mild difficulty and no complaints of pain, only fatigue. Introduced nordic eccentric hamstring curls with swiss ball assistance focused on good core engagement today along with progressing planks to a swiss ball with no reports of pain. Also added in soleus fitter stretch today as patient is unable to perform deep squats/crate lifts without (B) heel raise. With test/re-test following soleus stretching, patient was able to squat to a greater depth than before. Continued with farmer's carries as part of functional training for work duties while still under 10# restriction. Pt appears able to tolerate greater loads. PT will contact MD about altering lifting precautions to allowed continued progress.     Gurpreet is progressing well towards his goals.   Pt prognosis is Good.     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    Pt's spiritual, cultural and educational needs considered and pt agreeable to plan of care and goals.     Anticipated Barriers for therapy: chronicity of current injury    Goals: See UPOC     Pt will return to work full duty, full time.    Plan     Continue to advance PT towards established PT goals.     Plan  of care Certification: 8/14/2020 to 9/4/2020     Outpatient Physical Therapy 3 times weekly for 3 weeks to include the following interventions: Aquatic Therapy, Cervical/Lumbar Traction, Electrical Stimulation IFC/Russian, Gait Training, Manual Therapy, Moist Heat/ Ice, Neuromuscular Re-ed, Orthotic Management and Training, Patient Education, Self Care, Therapeutic Activites and Therapeutic Exercise.      Upon discharge from further skilled PT intervention it will determined if the need for a work conditioning program or Functional Capacity Evaluation is required to allow the injured worker to return to work with full potential achieved, continued improvement with body mechanics with advanced functional activities, and further prevention of future work-related injuries.     Darinel Rabago, PT  8/19/2020    I certify that I was present in the room directing the student in service delivery and guiding them using my skilled judgment. As the co-signing therapist I have reviewed the students documentation and am responsible for the treatment, assessment, and plan.   Dagoberto Ambriz, PT, DPT

## 2020-08-21 ENCOUNTER — CLINICAL SUPPORT (OUTPATIENT)
Dept: REHABILITATION | Facility: HOSPITAL | Age: 30
End: 2020-08-21
Payer: OTHER GOVERNMENT

## 2020-08-21 DIAGNOSIS — R53.1 DECREASED STRENGTH: ICD-10-CM

## 2020-08-21 DIAGNOSIS — M54.50 ACUTE BILATERAL LOW BACK PAIN WITHOUT SCIATICA: ICD-10-CM

## 2020-08-21 DIAGNOSIS — M53.86 DECREASED ROM OF LUMBAR SPINE: ICD-10-CM

## 2020-08-21 PROCEDURE — 97110 THERAPEUTIC EXERCISES: CPT | Mod: PN

## 2020-08-21 NOTE — PROGRESS NOTES
"Physical Therapy Daily Treatment Note     Name: Gurpreet Youngblood  Clinic Number: [id number]    Therapy Diagnosis:   Encounter Diagnoses   Name Primary?    Decreased ROM of lumbar spine     Decreased strength     Acute bilateral low back pain without sciatica      Physician: Andrew Aguirre Jr.,*    Visit Date: 8/21/2020    Physician Orders: PT Eval and Treat: Dinorah Protocol/technique, Desensitization  Medical Diagnosis from Referral:  M51.86 (ICD-10-CM) - Other intervertebral disc disorders, lumbar region  Evaluation Date: 7/23/2020  Authorization Period Expiration: 10/7/2020  Plan of Care Expiration: 9/4/2020   Visit # / Visits authorized: 11/24  FOTO: 1/10    Time In: 1:12 pm   Time Out: 1:45 pm  Total Appointment Time (timed & untimed codes): 33 minutes (2 TE)    Precautions: Standard    Subjective     Pt reports: no pain at work or at home, pt stated that he feels "he could return to full duty without issue"  He was compliant with home exercise program.  Response to previous treatment: muscle soreness (DOMS)  Functional change: no increased pain    Pain: 0/10 .   Location: B mid Lower back straight across     Objective/Treatment     Gurpreet received therapeutic exercises to develop strength, endurance, ROM, flexibility and core stabilization for 33 minutes including:  - SL Plank hip abduction (bend bottom leg)     2x10 ea  - Dead bugs         10x ea  - Nordic HS curls with swiss ball assistance     10 x  - Plank with elbows on blue swiss ball     10" x 3  - "Stir the pot" on swiss ball w/ plank     2 x 3 each way  - SAPD in contralateral SLS on Airex      3x10 - Double Black TB  - Half kneeling chops        15x - Black TB   - Multifidi walks:         RTB ankles; GTB UE; 40'x2  - Soleus fitter stretch        10" x 10- not done today  - 10# box lift from floor to waist to mat     2x10 - not done today  - Cybex hip ext         2x10 6 plates, knee flexion >90  - Newton carry         #10 - 2 laps in (L) UE, 2 " laps in (R) UE - not done today      Home Exercises Provided and Patient Education Provided   Education provided:   - encouraged safety with lifting mechanism    Written Home Exercises Provided: Patient instructed to cont prior HEP.  Exercises were reviewed and Gurpreet was able to demonstrate them prior to the end of the session.  Gurpreet demonstrated good  understanding of the education provided.     See EMR under Patient Instructions for exercises provided prior visit.    Assessment   Pt tolerated progression of exercise well with no complaints of pain only muscle fatigue. Introduced 2 new core activities that were challenging but performed well.  Also increased resistance and depth of leg press to increase LE extensor strength for functional squat once lifting restriction are altered. Pt continuing to tolerate progressively more difficult core/lumbar spine strengthening activities. PT will contact MD about altering lifting precautions to allowed continued progress or return to full duty at work.     Gurpreet is progressing well towards his goals.   Pt prognosis is Good.     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    Pt's spiritual, cultural and educational needs considered and pt agreeable to plan of care and goals.     Anticipated Barriers for therapy: chronicity of current injury    Goals:  Short Term Goals: 1 Week  1. Pt will increase lifting restriction per MD orders for continued functional training. - progressing  2. Pt will be able to maintain forearm plank for 1 full min to improve core endurance to facilitate lumbar spine support in extended standing activities. - progressing     Long Term Goals: 3 weeks  1. Increase strength to >/= 5/5 MMT grade for BLE and core  "stability to increase tolerance for ADL and work activities. Met  2. Patient's goal: Return to gainful employment. To get this resolved 100%. Not Met, Ongoing  3. Pt will perform prone lumbar extension endurance test for 1 min to improve endurance of lumbar musculature to decrease risk of pain in prolonged standing. - progressing  4. Pt will lift 50# box from floor to waist height table 10x to facilitate return to full duty employment, pending MD altering lifting restrictions. - progressing  5. Pt will carry 25# box 140 ft to simulate full duty work activities  - progressing  6. Pt will perform 20 reps of 25# "chops" in half kneeling position on Airex pad to improve kneeling balance for return to work activities. - progressing     Pt will return to work full duty, full time.    Plan     Continue to advance PT towards established PT goals.      Outpatient Physical Therapy 3 times weekly for 3 weeks to include the following interventions: Aquatic Therapy, Cervical/Lumbar Traction, Electrical Stimulation IFC/Russian, Gait Training, Manual Therapy, Moist Heat/ Ice, Neuromuscular Re-ed, Orthotic Management and Training, Patient Education, Self Care, Therapeutic Activites and Therapeutic Exercise.      Upon discharge from further skilled PT intervention it will determined if the need for a work conditioning program or Functional Capacity Evaluation is required to allow the injured worker to return to work with full potential achieved, continued improvement with body mechanics with advanced functional activities, and further prevention of future work-related injuries.     Nolvia Barker, SPT  8/21/2020    I certify that I was present in the room directing the student in service delivery and guiding them using my skilled judgment. As the co-signing therapist I have reviewed the students documentation and am responsible for the treatment, assessment, and plan.     Dagoberto Ambriz, PT, DPT      "

## 2020-08-24 ENCOUNTER — CLINICAL SUPPORT (OUTPATIENT)
Dept: REHABILITATION | Facility: HOSPITAL | Age: 30
End: 2020-08-24
Payer: OTHER GOVERNMENT

## 2020-08-24 DIAGNOSIS — M53.86 DECREASED ROM OF LUMBAR SPINE: ICD-10-CM

## 2020-08-24 DIAGNOSIS — R53.1 DECREASED STRENGTH: ICD-10-CM

## 2020-08-24 DIAGNOSIS — M54.50 ACUTE BILATERAL LOW BACK PAIN WITHOUT SCIATICA: ICD-10-CM

## 2020-08-24 PROCEDURE — 97110 THERAPEUTIC EXERCISES: CPT | Mod: PN

## 2020-08-24 NOTE — PROGRESS NOTES
"Physical Therapy Daily Treatment Note     [facility]  [facility] Number[facility] [id number]    Therapy Diagnosis:   Encounter Diagnoses   Name Primary?    Decreased ROM of lumbar spine     Decreased strength     Acute bilateral low back pain without sciatica      Physician: Andrew Aguirre Jr.,*    Visit Date: 8/24/2020    Physician Orders: PT Eval and Treat: Dinorah Protocol/technique, Desensitization  Medical Diagnosis from Referral:  M51.86 (ICD-10-CM) - Other intervertebral disc disorders, lumbar region  Evaluation Date: 7/23/2020  Authorization Period Expiration: 10/7/2020  Plan of Care Expiration: 9/4/2020   Visit # / Visits authorized: 12/24  FOTO: 2/10    Time In: 2:00 pm   Time Out: 2:45 pm  Total Appointment Time (timed & untimed codes): 45 minutes (3 TE)    Precautions: Standard    Subjective     Pt reports: only time that he has noticed some low back discomfort is when standing still for long periods of 45 minutes or more.  He was compliant with home exercise program.  Response to previous treatment: muscle soreness (DOMS)  Functional change: no increased pain    Pain: 0/10 .   Location: B mid Lower back straight across     Objective/Treatment     Gurpreet received therapeutic exercises to develop strength, endurance, ROM, flexibility and core stabilization for 45 minutes including:  - SL Plank hip abduction (bend bottom leg)     2x10 ea  - Dead bugs         15x B  - Planks         1'  - Nordic HS curls with swiss ball assistance     2x10  - "Stir the pot" on swiss ball w/ plank      10x CW/CCW  - Leg press         3x8 DL, 8 plates  - RDLs         10x B with single 10# wt in hands  - SAPD in contralateral SLS on Airex      3x10 - Double Black TB - not done today  - Half kneeling chops        15x - Black TB - not done today  - Multifidi walks:         RTB ankles; GTB UE; 40'x2 - not done today  - 10# box lift from floor to waist to mat     2x10 - not done today due to ease/restrictions  - Cybex hip " ext         2x10 6 plates, knee flexion >90 - not done today    Home Exercises Provided and Patient Education Provided   Education provided:   - encouraged safety with lifting mechanism    Written Home Exercises Provided: Patient instructed to cont prior HEP.  Exercises were reviewed and Gurpreet was able to demonstrate them prior to the end of the session.  Gurpreet demonstrated good  understanding of the education provided.     See EMR under Patient Instructions for exercises provided prior visit.    Assessment   Message sent to MD about adjusting lifting restrictions, lifting status limiting progress as of now. Pt doing very well overall, no pain throughout session, and only reports some low back discomfort with prolonged standing of 45 minutes or longer. Pt denies any pain or limitations with current work duties. Work excuse paper given today.     Gurpreet is progressing well towards his goals.   Pt prognosis is Good.     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    Pt's spiritual, cultural and educational needs considered and pt agreeable to plan of care and goals.     Anticipated Barriers for therapy: chronicity of current injury    Goals:  Short Term Goals: 1 Week  1. Pt will increase lifting restriction per MD orders for continued functional training. - progressing  2. Pt will be able to maintain forearm plank for 1 full min to improve core endurance to facilitate lumbar spine support in extended standing activities. - Met     Long Term Goals: 3 weeks  1. Increase strength to >/= 5/5 MMT grade for BLE and core stability to increase tolerance for ADL and work activities. Met  2. Patient's goal: Return to gainful employment. To get this resolved 100%. Not Met, Ongoing  3. Pt  "will perform prone lumbar extension endurance test for 1 min to improve endurance of lumbar musculature to decrease risk of pain in prolonged standing. - progressing  4. Pt will lift 50# box from floor to waist height table 10x to facilitate return to full duty employment, pending MD altering lifting restrictions. - progressing  5. Pt will carry 25# box 140 ft to simulate full duty work activities  - progressing  6. Pt will perform 20 reps of 25# "chops" in half kneeling position on Airex pad to improve kneeling balance for return to work activities. - progressing     Pt will return to work full duty, full time.    Plan     Continue to advance PT towards established PT goals.      Outpatient Physical Therapy 3 times weekly for 3 weeks to include the following interventions: Aquatic Therapy, Cervical/Lumbar Traction, Electrical Stimulation IFC/Russian, Gait Training, Manual Therapy, Moist Heat/ Ice, Neuromuscular Re-ed, Orthotic Management and Training, Patient Education, Self Care, Therapeutic Activites and Therapeutic Exercise.      Upon discharge from further skilled PT intervention it will determined if the need for a work conditioning program or Functional Capacity Evaluation is required to allow the injured worker to return to work with full potential achieved, continued improvement with body mechanics with advanced functional activities, and further prevention of future work-related injuries.     Dagoberto Ambriz, PT  8/24/2020        "

## 2020-09-04 ENCOUNTER — TELEPHONE (OUTPATIENT)
Dept: REHABILITATION | Facility: HOSPITAL | Age: 30
End: 2020-09-04

## 2020-09-04 ENCOUNTER — CLINICAL SUPPORT (OUTPATIENT)
Dept: REHABILITATION | Facility: HOSPITAL | Age: 30
End: 2020-09-04
Payer: OTHER GOVERNMENT

## 2020-09-04 DIAGNOSIS — M54.50 ACUTE BILATERAL LOW BACK PAIN WITHOUT SCIATICA: ICD-10-CM

## 2020-09-04 DIAGNOSIS — M53.86 DECREASED ROM OF LUMBAR SPINE: ICD-10-CM

## 2020-09-04 DIAGNOSIS — R53.1 DECREASED STRENGTH: ICD-10-CM

## 2020-09-04 PROCEDURE — 97110 THERAPEUTIC EXERCISES: CPT | Mod: PN,CQ

## 2020-09-04 NOTE — PROGRESS NOTES
"Physical Therapy Daily Treatment Note     Name: Gurpreet [facility]  Clinic Number: [id number]    Therapy Diagnosis:   Encounter Diagnoses   Name Primary?    Decreased ROM of lumbar spine     Decreased strength     Acute bilateral low back pain without sciatica      Physician: Andrew Aguirre Jr.,*    Visit Date: 9/4/2020    Physician Orders: PT Eval and Treat: Dinorah Protocol/technique, Desensitization  Medical Diagnosis from Referral:  M51.86 (ICD-10-CM) - Other intervertebral disc disorders, lumbar region  Evaluation Date: 7/23/2020  Authorization Period Expiration: 10/7/2020  Plan of Care Expiration: 9/4/2020   Visit # / Visits authorized: 13/24  FOTO: 3/10    Time In: 1:35 pm   Time Out: 2:00 pm  Total Appointment Time (timed & untimed codes): 25 minutes (2 TE)    Precautions: Standard    Subjective     Pt reports: having a little back pain today.  He was compliant with home exercise program.  Response to previous treatment: muscle soreness (DOMS)  Functional change: no increased pain    Pain: 2/10 .   Location: B mid Lower back straight across     Objective/Treatment     Gurpreet received therapeutic exercises to develop strength, endurance, ROM, flexibility and core stabilization for 25 minutes including:  - SL Plank hip abduction (bend bottom leg)  2x10 ea  - Dead bugs      15x B  - Planks      1'  - Nordic HS curls with swiss ball assistance  2x10  - "Stir the pot" on swiss ball w/ plank   10x CW/CCW  - Leg press      3x10 DL, 8 plates  - RDLs       10x B with single 10# wt in hands  - SAPD in contralateral SLS on Airex   3x10 - Double Black TB - not done today  - Half kneeling chops     15x - Black TB - not done today   - Multifidi walks:      RTB ankles; GTB UE; 40'x2 - not done today  - 10# box lift from floor to waist to mat  2x10 - not done today due to ease/restrictions  - Cybex hip ext      2x10 6 plates, knee flexion >90 - not done today    Home Exercises Provided and Patient Education Provided "   Education provided:   - encouraged safety with lifting mechanism    Written Home Exercises Provided: Patient instructed to cont prior HEP.  Exercises were reviewed and Gurpreet was able to demonstrate them prior to the end of the session.  Gurpreet demonstrated good  understanding of the education provided.     See EMR under Patient Instructions for exercises provided prior visit.    Assessment   Patient completed therapy program listed above, along with today's progressions, with no increase in symptoms prior to leaving the clinic.      Gurpreet is progressing well towards his goals.   Pt prognosis is Good.     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    Pt's spiritual, cultural and educational needs considered and pt agreeable to plan of care and goals.     Anticipated Barriers for therapy: chronicity of current injury    Goals:  Short Term Goals: 1 Week  1. Pt will increase lifting restriction per MD orders for continued functional training. - progressing  2. Pt will be able to maintain forearm plank for 1 full min to improve core endurance to facilitate lumbar spine support in extended standing activities. - Met     Long Term Goals: 3 weeks  1. Increase strength to >/= 5/5 MMT grade for BLE and core stability to increase tolerance for ADL and work activities. Met  2. Patient's goal: Return to gainful employment. To get this resolved 100%. Not Met, Ongoing  3. Pt will perform prone lumbar extension endurance test for 1 min to improve endurance of lumbar musculature to decrease risk of pain in prolonged standing. - progressing  4. Pt will lift 50# box from floor to waist height table 10x to facilitate return to full duty employment, pending MD altering lifting restrictions. -  "progressing  5. Pt will carry 25# box 140 ft to simulate full duty work activities  - progressing  6. Pt will perform 20 reps of 25# "chops" in half kneeling position on Airex pad to improve kneeling balance for return to work activities. - progressing     Pt will return to work full duty, full time.    Plan     Continue to advance PT towards established PT goals.      Outpatient Physical Therapy 3 times weekly for 3 weeks to include the following interventions: Aquatic Therapy, Cervical/Lumbar Traction, Electrical Stimulation IFC/Russian, Gait Training, Manual Therapy, Moist Heat/ Ice, Neuromuscular Re-ed, Orthotic Management and Training, Patient Education, Self Care, Therapeutic Activites and Therapeutic Exercise.      Upon discharge from further skilled PT intervention it will determined if the need for a work conditioning program or Functional Capacity Evaluation is required to allow the injured worker to return to work with full potential achieved, continued improvement with body mechanics with advanced functional activities, and further prevention of future work-related injuries.     Gilmar Huff, PTA  9/4/2020        "

## 2020-09-09 ENCOUNTER — TELEPHONE (OUTPATIENT)
Dept: REHABILITATION | Facility: HOSPITAL | Age: 30
End: 2020-09-09

## 2020-09-09 NOTE — TELEPHONE ENCOUNTER
Called patient about no show to visit this afternoon. Patient did not answer and a voice mail box was not set up to leave a message.

## 2020-09-11 ENCOUNTER — CLINICAL SUPPORT (OUTPATIENT)
Dept: REHABILITATION | Facility: HOSPITAL | Age: 30
End: 2020-09-11
Payer: OTHER GOVERNMENT

## 2020-09-11 DIAGNOSIS — M53.86 DECREASED ROM OF LUMBAR SPINE: ICD-10-CM

## 2020-09-11 DIAGNOSIS — M54.50 ACUTE BILATERAL LOW BACK PAIN WITHOUT SCIATICA: ICD-10-CM

## 2020-09-11 DIAGNOSIS — R53.1 DECREASED STRENGTH: ICD-10-CM

## 2020-09-11 PROCEDURE — 97110 THERAPEUTIC EXERCISES: CPT | Mod: PN

## 2020-09-11 NOTE — PROGRESS NOTES
"Physical Therapy Daily Treatment Note     Name: Gurpreet Youngblood  Clinic Number: [id number]    Therapy Diagnosis:   Encounter Diagnoses   Name Primary?    Decreased ROM of lumbar spine     Decreased strength     Acute bilateral low back pain without sciatica      Physician: Andrew Aguirre Jr.,*    Visit Date: 9/11/2020    Physician Orders: PT Eval and Treat: Dinorah Protocol/technique, Desensitization  Medical Diagnosis from Referral:  M51.86 (ICD-10-CM) - Other intervertebral disc disorders, lumbar region  Evaluation Date: 7/23/2020  Authorization Period Expiration: 10/7/2020  Plan of Care Expiration: 9/4/2020   Visit # / Visits authorized: 14/24  FOTO: 4/10    Time In: 2:15 pm   Time Out: 2:55 pm  Total Appointment Time (timed & untimed codes): 40 minutes (3 TE)    Precautions: Standard    Subjective     Pt reports: he is not having any back pain today. He recently saw the doctor who increased his lifting restrictions to 20#. Patient and MD also talked about ordering TENS unit for patient as patient had recent low back muscle spasm   He was compliant with home exercise program.  Response to previous treatment: muscle soreness (DOMS)  Functional change: no increased pain    Pain: 0/10 .   Location: B mid Lower back straight across     Objective/Treatment     Gurpreet received therapeutic exercises to develop strength, endurance, ROM, flexibility and core stabilization for 40 minutes including:  - SL Plank hip abduction (bend bottom leg)  2x10 ea  - Dead bugs      2 x 10  - 20# box lift - floor to waist to mat   15 x  - Plank on bosu ball      3 x 30"   - Half kneeling pallof isometric - BTB  1 x 15  - Plank on swiss ball      6 x 10"  - Nordic HS curls with swiss ball assistance  2x10  - Retrograde TM walk - RTB at knees/ankles 3'  - Lateral TM walk - RTB at knees/ankles  1' each   - Leg press      3x10 DL, 8 plates        Home Exercises Provided and Patient Education Provided   Education provided:   - " encouraged safety with lifting mechanism    Written Home Exercises Provided: Patient instructed to cont prior HEP.  Exercises were reviewed and Gurpreet was able to demonstrate them prior to the end of the session.  Gurpreet demonstrated good  understanding of the education provided.     See EMR under Patient Instructions for exercises provided prior visit.    Assessment   Patient presented to physical therapy with 0/10 low back pain and reports that MD has increased lifting restrictions from 10 lbs to 20 lbs. Patient is continuing to display ability to perform progressive high level core stability exercises without reports of increased pain. Patient had no difficulty with performing 20 lb floor to mat transfers and has minimal limitations at this time.      Gurpreet is progressing well towards his goals.   Pt prognosis is Good.     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    Pt's spiritual, cultural and educational needs considered and pt agreeable to plan of care and goals.     Anticipated Barriers for therapy: chronicity of current injury    Goals:  Short Term Goals: 1 Week  1. Pt will increase lifting restriction per MD orders for continued functional training. - progressing  2. Pt will be able to maintain forearm plank for 1 full min to improve core endurance to facilitate lumbar spine support in extended standing activities. - Met     Long Term Goals: 3 weeks  1. Increase strength to >/= 5/5 MMT grade for BLE and core stability to increase tolerance for ADL and work activities. Met  2. Patient's goal: Return to gainful employment. To get this resolved 100%. Not Met, Ongoing  3. Pt will perform prone lumbar extension endurance test for 1 min to improve endurance of lumbar  "musculature to decrease risk of pain in prolonged standing. - progressing  4. Pt will lift 50# box from floor to waist height table 10x to facilitate return to full duty employment, pending MD altering lifting restrictions. - progressing  5. Pt will carry 25# box 140 ft to simulate full duty work activities  - progressing  6. Pt will perform 20 reps of 25# "chops" in half kneeling position on Airex pad to improve kneeling balance for return to work activities. - progressing     Pt will return to work full duty, full time.    Plan     Continue to advance PT towards established PT goals.      Outpatient Physical Therapy 3 times weekly for 3 weeks to include the following interventions: Aquatic Therapy, Cervical/Lumbar Traction, Electrical Stimulation IFC/Russian, Gait Training, Manual Therapy, Moist Heat/ Ice, Neuromuscular Re-ed, Orthotic Management and Training, Patient Education, Self Care, Therapeutic Activites and Therapeutic Exercise.      Upon discharge from further skilled PT intervention it will determined if the need for a work conditioning program or Functional Capacity Evaluation is required to allow the injured worker to return to work with full potential achieved, continued improvement with body mechanics with advanced functional activities, and further prevention of future work-related injuries.     Darinel Rabago, PT  9/14/2020        "

## 2020-09-21 ENCOUNTER — CLINICAL SUPPORT (OUTPATIENT)
Dept: REHABILITATION | Facility: HOSPITAL | Age: 30
End: 2020-09-21
Payer: OTHER GOVERNMENT

## 2020-09-21 DIAGNOSIS — M53.86 DECREASED ROM OF LUMBAR SPINE: ICD-10-CM

## 2020-09-21 DIAGNOSIS — R53.1 DECREASED STRENGTH: ICD-10-CM

## 2020-09-21 DIAGNOSIS — M54.50 ACUTE BILATERAL LOW BACK PAIN WITHOUT SCIATICA: ICD-10-CM

## 2020-09-21 PROCEDURE — 97110 THERAPEUTIC EXERCISES: CPT | Mod: PN

## 2020-09-21 NOTE — PROGRESS NOTES
"Physical Therapy Daily Treatment Note     [facility]  [id number]    Therapy Diagnosis:   Encounter Diagnoses   Name Primary?    Decreased ROM of lumbar spine     Decreased strength     Acute bilateral low back pain without sciatica      Physician: Andrew Aguirre Jr.,*    Visit Date: 9/21/2020    Physician Orders: PT Eval and Treat: Dinorah Protocol/technique, Desensitization  Medical Diagnosis from Referral:  M51.86 (ICD-10-CM) - Other intervertebral disc disorders, lumbar region  Evaluation Date: 7/23/2020  Authorization Period Expiration: 10/7/2020  Plan of Care Expiration: 10/16/2020 [facility] done today.  Visit # / Visits authorized: 14/24  FOTO: 4/10    Time In: 2:05 pm   Time Out: 2:45 pm  Total Appointment Time (timed & untimed codes): 40 minutes (3 TE)    Precautions: Standard    Subjective     Pt reports: having some low back pain last Tuesday for reasons unknown that was more throbbing on his left low back. Still performing modified duty at work.  He was compliant with home exercise program.  Response to previous treatment: muscle soreness (DOMS)  Functional change: no increased pain    Pain: 0/10 .   Location: B mid Lower back straight across     Objective/Treatment     Gurpreet received therapeutic exercises to develop strength, endurance, ROM, flexibility and core stabilization for 40 minutes including:    Not done today due to re-assessment/testing  - SL Plank hip abduction (bend bottom leg)  2x10 ea  - Dead bugs      2 x 10  - 20# box lift - floor to waist to mat   15 x  - Plank on bosu ball      3 x 30"   - Half kneeling pallof isometric - BTB   1 x 15  - Plank on swiss ball      6 x 10"  - Nordic HS curls with swiss ball assistance  2x10  - Retrograde TM walk - RTB at knees/ankles  3'  - Lateral TM walk - RTB at knees/ankles  1' each   - Leg press      3x10 DL, 8 plates    Home Exercises Provided and Patient Education Provided   Education provided:   - encouraged safety " with lifting mechanism    Written Home Exercises Provided: Patient instructed to cont prior HEP.  Exercises were reviewed and Gurpreet was able to demonstrate them prior to the end of the session.  Gurpreet demonstrated good  understanding of the education provided.     See EMR under Patient Instructions for exercises provided prior visit.    Assessment   slight muscle deficits noted with manual muscle testing, negative neurological testing, negative facet joint loading testing, and improved plank and lumbar muscle extensor endurance reaching a minute for each today without pain. Pt fatigued after functional testing today, and will advance functional job specific exercises within MD restrictions. Pt progressing very well overall, and only reports slight left sided low back discomfort walking more than 2 miles or standing all day.     Gurpreet is progressing well towards his goals.   Pt prognosis is Good.     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    Pt's spiritual, cultural and educational needs considered and pt agreeable to plan of care and goals.     Anticipated Barriers for therapy: chronicity of current injury    Goals:  Short Term Goals: 1 Week  1. Pt will increase lifting restriction per MD orders for continued functional training. - progressing  2. Pt will be able to maintain forearm plank for 1 full min to improve core endurance to facilitate lumbar spine support in extended standing activities. - Met     Long Term Goals: 3 weeks  1. Increase strength to >/= 5/5 MMT grade for BLE and core stability to increase tolerance for ADL and work activities. Met  2. Patient's goal: Return to gainful employment. To get this resolved 100%. Not Met, Ongoing  3. Pt will perform prone  "lumbar extension endurance test for 1 min to improve endurance of lumbar musculature to decrease risk of pain in prolonged standing. - Met  4. Pt will lift 50# box from floor to waist height table 10x to facilitate return to full duty employment, pending MD altering lifting restrictions. - progressing  5. Pt will carry 20# box 140 ft to simulate full duty work activities  - Met  6. Pt will perform 20 reps of 25# "chops" in half kneeling position on Airex pad to improve kneeling balance for return to work activities. - progressing  Pt will return to work full duty, full time.    Plan     Continue to advance PT towards established PT goals.      Outpatient Physical Therapy 3 times weekly for 3 weeks to include the following interventions: Aquatic Therapy, Cervical/Lumbar Traction, Electrical Stimulation IFC/Russian, Gait Training, Manual Therapy, Moist Heat/ Ice, Neuromuscular Re-ed, Orthotic Management and Training, Patient Education, Self Care, Therapeutic Activites and Therapeutic Exercise.      Upon discharge from further skilled PT intervention it will determined if the need for a work conditioning program or Functional Capacity Evaluation is required to allow the injured worker to return to work with full potential achieved, continued improvement with body mechanics with advanced functional activities, and further prevention of future work-related injuries.     Dagoberto Ambriz, PT  9/21/2020        "

## 2020-09-21 NOTE — PLAN OF CARE
Outpatient Therapy Updated Plan of Care     Visit Date: 9/21/2020  Name: Gurpreet Youngblood  Clinic Number: [id number]    Therapy Diagnosis:   Encounter Diagnoses   Name Primary?    Decreased ROM of lumbar spine     Decreased strength     Acute bilateral low back pain without sciatica      Physician: Andrew Aguirre Jr.,*    Physician Orders: PT Eval and Treat: Dinorah Protocol/technique, Desensitization  Medical Diagnosis from Referral:  M51.86 (ICD-10-CM) - Other intervertebral disc disorders, lumbar region  Evaluation Date: 7/23/2020  Total Visits Received: 14    Current Certification Period:  7/23/2020 to 9/4/2020  Precautions:  Standard  Visits from Evaluation Date:  14  Functional Level Prior to Evaluation:  Limited in prolonged standing/walking, pain with heavy lifting    Subjective     Update: having some low back pain last Tuesday for reasons unknown that was more throbbing on his left low back. Still performing modified duty at work; reports work is very slow right now.    Objective     Update:   A/PROM and MMT:  * = low back pain with testing  NT = Not tested  AROM: (degrees) LUMBAR   Flexion (40-50) 80%, L low back tightness, no pain   Extension (15-20) 100%   Right side bending (~20) 100%   Left side bending  (~20) 100%   Right rotation (5-10) 100%   Left rotation (5-10) 100%      Hip   Right     Left   Pain/Dysfunction with Movement     AROM PROM MMT AROM PROM MMT     Flexion (L2,120 deg) WFL WFL 4+/5 WFL WFL 5/5     Extension (20 deg) WFL WFL 4/5 WFL WFL 5/5     Abduction (L5, 45 deg) WFL WFL 4+/5 WFL WFL 4+/5     Adduction (30 deg) WFL WFL 5/5 WFL WFL 5/5     IR (30 deg) WFL WFL 5/5 WFL WFL 5/5     ER (45 deg) WFL WFL 5/5 WFL WFL 5/5        Knee   Right     Left   Pain/Dysfunction with Movement     AROM PROM MMT AROM PROM MMT     Flexion (S2, 140 deg) WFL WFL 5/5 WFL WFL 5/5     Extension (L3, 0-5 deg) WFL WFL 5/5 WFL WFL 5/5        Ankle   Right     Left   Pain/Dysfunction with Movement      AROM PROM MMT AROM PROM MMT     Dorsiflexion (L4, 20 deg) WFL WFL 5/5 WFL WFL 5/5     Plantarflexion (S1, 50 deg) WFL WFL 5/5 WFL WFL 5/5     Inversion (20-30 deg) WFL WFL 5/5 WFL WFL 5/5     Eversion (5-10 deg) WFL WFL 5/5 WFL WFL 5/5     Great toe extension (L5, 70 deg) WFL WFL 5/5 WFL WFL 5/5        Lumbar Tests:  SLR Test (L4-S3) = negative B  Slump tests: negative B  Quadrant testing: negative for all  Plank time: 1 minute  Prone lumbar ext endurance test: 1' without UE use    Palpation: no increased paraspinal tone today upon palpation. Pt reported slight difference in feeling at L3-L4 level on left side of paraspinals. No reported tenderness.    Functional Job Specific Testing: Current lifting and carrying restrictions set at 20#  - 20# box lift from floor to table, 30x  - 20# box carry, 3 laps of 140' in 2 mins and 11 seconds    Assessment     Update: slight muscle deficits noted with manual muscle testing, negative neurological testing, negative facet joint loading testing, and improved plank and lumbar muscle extensor endurance reaching a minute for each today without pain. Pt fatigued after functional testing today, and will advance functional job specific exercises within MD restrictions. Pt progressing very well overall, and only reports slight left sided low back discomfort walking more than 2 miles or standing all day.    Previous Short Term Goals Status:   Goals:  Short Term Goals: 1 Week  1. Pt will increase lifting restriction per MD orders for continued functional training. - progressing  2. Pt will be able to maintain forearm plank for 1 full min to improve core endurance to facilitate lumbar spine support in extended standing activities. - Met     Long Term Goals: 3 weeks  1. Increase strength to >/= 5/5 MMT grade for BLE and core stability to increase tolerance for ADL and work activities. Met  2. Patient's goal: Return to gainful employment. To get this resolved 100%. Not Met, Ongoing  3. Pt  "will perform prone lumbar extension endurance test for 1 min to improve endurance of lumbar musculature to decrease risk of pain in prolonged standing. - Met  4. Pt will lift 50# box from floor to waist height table 10x to facilitate return to full duty employment, pending MD altering lifting restrictions. - progressing  5. Pt will carry 20# box 140 ft to simulate full duty work activities  - Met  6. Pt will perform 20 reps of 25# "chops" in half kneeling position on Airex pad to improve kneeling balance for return to work activities. - progressing  Pt will return to work full duty, full time.  Long Term Goal Status:   continue per initial plan of care.  Reasons for Recertification of Therapy:   UPOC    Plan     Updated Certification Period: 9/21/2020 to 10/16/2020  Recommended Treatment Plan: 3 times per week for 3 weeks: Aquatic Therapy, Cervical/Lumbar Traction, Electrical Stimulation IFC, Gait Training, Manual Therapy, Moist Heat/ Ice, Neuromuscular Re-ed, Orthotic Management and Training, Patient Education, Self Care, Therapeutic Activites and Therapeutic Exercise  Other Recommendations: None    Dagoberto Ambriz, PT  9/21/2020      I CERTIFY THE NEED FOR THESE SERVICES FURNISHED UNDER THIS PLAN OF TREATMENT AND WHILE UNDER MY CARE    Physician's comments:        Physician's Signature: ___________________________________________________    "

## 2020-09-28 ENCOUNTER — CLINICAL SUPPORT (OUTPATIENT)
Dept: REHABILITATION | Facility: HOSPITAL | Age: 30
End: 2020-09-28
Payer: OTHER GOVERNMENT

## 2020-09-28 DIAGNOSIS — M54.50 ACUTE BILATERAL LOW BACK PAIN WITHOUT SCIATICA: ICD-10-CM

## 2020-09-28 DIAGNOSIS — R53.1 DECREASED STRENGTH: ICD-10-CM

## 2020-09-28 DIAGNOSIS — M53.86 DECREASED ROM OF LUMBAR SPINE: ICD-10-CM

## 2020-09-28 PROCEDURE — 97110 THERAPEUTIC EXERCISES: CPT | Mod: PN

## 2020-09-28 NOTE — PROGRESS NOTES
"Physical Therapy Daily Treatment Note     [facility]  [facility] Number[facility] [id number]    Therapy Diagnosis:   Encounter Diagnoses   Name Primary?    Decreased ROM of lumbar spine     Decreased strength     Acute bilateral low back pain without sciatica      Physician: Andrew Aguirre Jr.,*    Visit Date: 9/28/2020    Physician Orders: PT Eval and Treat: Dinorah Protocol/technique, Desensitization  Medical Diagnosis from Referral:  M51.86 (ICD-10-CM) - Other intervertebral disc disorders, lumbar region  Evaluation Date: 7/23/2020  Authorization Period Expiration: 10/7/2020  Plan of Care Expiration: 10/16/2020  Visit # / Visits authorized: 15/24  FOTO: 5/10    Time In: 2:05 pm   Time Out: 2:45 pm  Total Appointment Time (timed & untimed codes): 40 minutes (3 TE)    Precautions: Standard    Subjective     Pt reports: no pain, tightness, or muscle spasms at work/home  He was compliant with home exercise program.  Response to previous treatment: muscle soreness (DOMS)  Functional change: no increased pain    Pain: 0/10 .   Location: B mid Lower back straight across     Objective/Treatment     Gurpreet received therapeutic exercises to develop strength, endurance, ROM, flexibility and core stabilization for 40 minutes including:    - SL Plank hip abduction (bend bottom leg)  2x10 ea  - Dead bugs      2 x 10 with SB  - Heel rocking (downward dog)   6x10''  - 20# box lift - floor to waist to mat   2x10  - Plank on bosu ball      3 x 30"  - not done today  - Half kneeling chops - BTB   2x10 on each leg in and in each direction  - Plank on swiss ball      6 x 10" - not done today  - Lateral TM walk - RTB at knees/ankles  1' each   - Leg press      4x8 DL, 9.5 plates    Home Exercises Provided and Patient Education Provided   Education provided:   - encouraged safety with lifting mechanism    Written Home Exercises Provided: Patient instructed to cont prior HEP.  Exercises were reviewed and Gurpreet was able to " demonstrate them prior to the end of the session.  Gurpreet demonstrated good  understanding of the education provided.     See EMR under Patient Instructions for exercises provided prior visit.    Assessment   Re-assessment: Slight muscle deficits noted with manual muscle testing, negative neurological testing, negative facet joint loading testing, and improved plank and lumbar muscle extensor endurance reaching a minute for each today without pain. Pt fatigued after functional testing today, and will advance functional job specific exercises within MD restrictions. Pt progressing very well overall, and only reports slight left sided low back discomfort walking more than 2 miles or standing all day.    Pt did well today and reported no pain throughout. Did have some muscle tightness at the left side of his low back tightness that was resolved with paraspinal stretching. Tightness issue did not come up again in therapy today. Pt appears ready for increase in lifting restrictions soon; he will see MD mid next month.     Gurpreet is progressing well towards his goals.   Pt prognosis is Good.     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    Pt's spiritual, cultural and educational needs considered and pt agreeable to plan of care and goals.     Anticipated Barriers for therapy: chronicity of current injury    Goals:  Short Term Goals: 1 Week  1. Pt will increase lifting restriction per MD orders for continued functional training. - progressing  2. Pt will be able to maintain forearm plank for 1 full min to improve core endurance to facilitate lumbar spine support in extended standing activities. - Met     Long Term Goals: 3 weeks  1. Increase strength to >/= 5/5 MMT grade for BLE  "and core stability to increase tolerance for ADL and work activities. Met  2. Patient's goal: Return to gainful employment. To get this resolved 100%. Not Met, Ongoing  3. Pt will perform prone lumbar extension endurance test for 1 min to improve endurance of lumbar musculature to decrease risk of pain in prolonged standing. - Met  4. Pt will lift 50# box from floor to waist height table 10x to facilitate return to full duty employment, pending MD altering lifting restrictions. - progressing  5. Pt will carry 20# box 140 ft to simulate full duty work activities  - Met  6. Pt will perform 20 reps of 25# "chops" in half kneeling position on Airex pad to improve kneeling balance for return to work activities. - progressing  Pt will return to work full duty, full time.    Plan     Continue to advance PT towards established PT goals.      Outpatient Physical Therapy 3 times weekly for 3 weeks to include the following interventions: Aquatic Therapy, Cervical/Lumbar Traction, Electrical Stimulation IFC/Russian, Gait Training, Manual Therapy, Moist Heat/ Ice, Neuromuscular Re-ed, Orthotic Management and Training, Patient Education, Self Care, Therapeutic Activites and Therapeutic Exercise.      Upon discharge from further skilled PT intervention it will determined if the need for a work conditioning program or Functional Capacity Evaluation is required to allow the injured worker to return to work with full potential achieved, continued improvement with body mechanics with advanced functional activities, and further prevention of future work-related injuries.     Dagoberto Ambriz, PT  9/28/2020    "

## 2020-10-06 ENCOUNTER — CLINICAL SUPPORT (OUTPATIENT)
Dept: REHABILITATION | Facility: HOSPITAL | Age: 30
End: 2020-10-06
Payer: OTHER GOVERNMENT

## 2020-10-06 DIAGNOSIS — M54.50 ACUTE BILATERAL LOW BACK PAIN WITHOUT SCIATICA: ICD-10-CM

## 2020-10-06 DIAGNOSIS — R53.1 DECREASED STRENGTH: ICD-10-CM

## 2020-10-06 DIAGNOSIS — M53.86 DECREASED ROM OF LUMBAR SPINE: ICD-10-CM

## 2020-10-06 PROCEDURE — 97110 THERAPEUTIC EXERCISES: CPT | Mod: PN

## 2020-10-06 NOTE — PROGRESS NOTES
"Physical Therapy Daily Treatment Note     Name: Gurpreet Youngblood  Clinic Number: [id number]    Therapy Diagnosis:   Encounter Diagnoses   Name Primary?    Decreased ROM of lumbar spine     Decreased strength     Acute bilateral low back pain without sciatica      Physician: Andrew Aguirre Jr.,*    Visit Date: 10/6/2020    Physician Orders: PT Eval and Treat: Dinorah Protocol/technique, Desensitization  Medical Diagnosis from Referral:  M51.86 (ICD-10-CM) - Other intervertebral disc disorders, lumbar region  Evaluation Date: 7/23/2020  Authorization Period Expiration: 10/7/2020  Plan of Care Expiration: 10/16/2020  Visit # / Visits authorized: 16/24  FOTO: 6/10    Time In: 3:05 pm   Time Out: 3:45 pm  Total Appointment Time (timed & untimed codes): 40 minutes (3 TE)    Precautions: Standard    Subjective     Pt reports: he has not had any recent low back pain or flare ups. Patient reports he will be seeing the MD tomorrow and he was asked to request his lifting restrictions are increased from 20# to 50#  He was compliant with home exercise program.  Response to previous treatment: muscle soreness (DOMS)  Functional change: no increased pain    Pain: 0/10 .   Location: B mid Lower back straight across     Objective/Treatment     Gurpreet received therapeutic exercises to develop strength, endurance, ROM, flexibility and core stabilization for 40 minutes including:    - SL Plank hip abduction (bend bottom leg)  2 x 10  - Retro TM - GTB around ankles   3'  - Lateral TM walk - GTB at knees/ankles  1' each   - 20# box lift - floor to waist to mat   2 x 10  - Leg press - 10/12/12 plates    3 x 10 DL  - Dead bugs      2 x 10 with SB  - Plank on bosu ball      10 x getting tennis ball in the middle   - Half kneeling chops - BTB    2 x 10 on each leg in and in each direction  - Squats on bosu ball (dome down)  15 x - UE use in the beginning   - HS stretch at stairs    4 x 15"        Home Exercises Provided and Patient " Education Provided   Education provided:   - encouraged safety with lifting mechanism    Written Home Exercises Provided: Patient instructed to cont prior HEP.  Exercises were reviewed and Gurpreet was able to demonstrate them prior to the end of the session.  Gurpreet demonstrated good  understanding of the education provided.     See EMR under Patient Instructions for exercises provided prior visit.    Assessment   Patient presented to physical therapy today with reports of 0/10 low back pain and reports he has not had any recent acute flare ups. Patient is seeing the MD tomorrow and was told to requesting lifting restriction be increased from 20# to 50# in order to simulate his work demands. Patient is showing signs of increased dynamic core stability and endurance. Patient is responding well to physical therapy and will continue to benefit from core stabilization training.    Pt did well today and reported no pain throughout. Did have some muscle tightness at the left side of his low back tightness that was resolved with paraspinal stretching. Tightness issue did not come up again in therapy today. Pt appears ready for increase in lifting restrictions soon; he will see MD mid next month.     Gurpreet is progressing well towards his goals.   Pt prognosis is Good.     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    Pt's spiritual, cultural and educational needs considered and pt agreeable to plan of care and goals.     Anticipated Barriers for therapy: chronicity of current injury    Goals:  Short Term Goals: 1 Week  1. Pt will increase lifting restriction per MD orders for continued functional training. - progressing  2. Pt will be able to maintain forearm plank for 1 full min to  "improve core endurance to facilitate lumbar spine support in extended standing activities. - Met     Long Term Goals: 3 weeks  1. Increase strength to >/= 5/5 MMT grade for BLE and core stability to increase tolerance for ADL and work activities. Met  2. Patient's goal: Return to gainful employment. To get this resolved 100%. Not Met, Ongoing  3. Pt will perform prone lumbar extension endurance test for 1 min to improve endurance of lumbar musculature to decrease risk of pain in prolonged standing. - Met  4. Pt will lift 50# box from floor to waist height table 10x to facilitate return to full duty employment, pending MD altering lifting restrictions. - progressing  5. Pt will carry 20# box 140 ft to simulate full duty work activities  - Met  6. Pt will perform 20 reps of 25# "chops" in half kneeling position on Airex pad to improve kneeling balance for return to work activities. - progressing  Pt will return to work full duty, full time.    Plan     Continue to advance PT towards established PT goals.      Outpatient Physical Therapy 3 times weekly for 3 weeks to include the following interventions: Aquatic Therapy, Cervical/Lumbar Traction, Electrical Stimulation IFC/Russian, Gait Training, Manual Therapy, Moist Heat/ Ice, Neuromuscular Re-ed, Orthotic Management and Training, Patient Education, Self Care, Therapeutic Activites and Therapeutic Exercise.      Upon discharge from further skilled PT intervention it will determined if the need for a work conditioning program or Functional Capacity Evaluation is required to allow the injured worker to return to work with full potential achieved, continued improvement with body mechanics with advanced functional activities, and further prevention of future work-related injuries.     Darinel Rabago, PT  10/6/2020    "

## 2020-10-22 DIAGNOSIS — M54.50 LOW BACK PAIN: ICD-10-CM

## 2020-10-22 DIAGNOSIS — S33.5XXD LUMBAR SPRAIN, SUBSEQUENT ENCOUNTER: Primary | ICD-10-CM

## 2020-10-26 ENCOUNTER — CLINICAL SUPPORT (OUTPATIENT)
Dept: REHABILITATION | Facility: HOSPITAL | Age: 30
End: 2020-10-26
Payer: OTHER GOVERNMENT

## 2020-10-26 DIAGNOSIS — R53.1 DECREASED STRENGTH: ICD-10-CM

## 2020-10-26 DIAGNOSIS — M54.50 ACUTE BILATERAL LOW BACK PAIN WITHOUT SCIATICA: ICD-10-CM

## 2020-10-26 DIAGNOSIS — M53.86 DECREASED ROM OF LUMBAR SPINE: ICD-10-CM

## 2020-10-26 PROCEDURE — 97110 THERAPEUTIC EXERCISES: CPT | Mod: PN

## 2020-10-26 NOTE — PROGRESS NOTES
"Physical Therapy Daily Treatment Note     Name: Gurpreet Youngblood  Clinic Number: [id number]    Therapy Diagnosis:   Encounter Diagnoses   Name Primary?    Decreased ROM of lumbar spine     Decreased strength     Acute bilateral low back pain without sciatica      Physician: Andrew Aguirre Jr.,*    Visit Date: 10/26/2020    Physician Orders: PT Eval and Treat: Dinorah Protocol/technique, Desensitization  Medical Diagnosis from Referral:  M51.86 (ICD-10-CM) - Other intervertebral disc disorders, lumbar region  Evaluation Date: 7/23/2020  Authorization Period Expiration: 10/7/2020  Plan of Care Expiration: 10/16/2020  Visit # / Visits authorized: 17/24  FOTO: 7/10    Time In: 4:35 pm   Time Out: 5:15 pm  Total Appointment Time (timed & untimed codes): 40 minutes (3 TE)    Precautions: Standard    Subjective     Pt reports: he has not had any recent low back pain or flare ups. Patient reports he will be seeing the MD tomorrow and he was asked to request his lifting restrictions are increased from 20# to 50#  He was compliant with home exercise program.  Response to previous treatment: muscle soreness (DOMS)  Functional change: no increased pain    Pain: 0/10 .   Location: B mid Lower back straight across     Objective/Treatment     Gurpreet received therapeutic exercises to develop strength, endurance, ROM, flexibility and core stabilization for 40 minutes including:    - SL Plank hip abduction    2 x 10  - Retro TM - GTB around ankles - 0.8 mph  3'  - Lateral TM walk - GTB at knees/ankles  1' each   - 20# box lift - floor to waist to mat   2 x 10  - Leg press - 10/12/12 plates    3 x 10 DL  - Plank on bosu ball      10 x getting tennis ball in the middle   - Squats on bosu ball (dome down)   2 x 10 - UE use in the beginning   - HS stretch at stairs     4 x 15"        Home Exercises Provided and Patient Education Provided   Education provided:   - encouraged safety with lifting mechanism    Written Home Exercises " Provided: Patient instructed to cont prior HEP.  Exercises were reviewed and Gurpreet was able to demonstrate them prior to the end of the session.  Gurpreet demonstrated good  understanding of the education provided.     See EMR under Patient Instructions for exercises provided prior visit.    Assessment   Patient presented to physical therapy today with reports of 0/10 low back pain and reports he has not had any recent acute flare ups. Patient recently received authorization for further visits and reported that the MD discussed lifting restrictions being increased to 25#. Patient is showing signs of increased dynamic core stability and endurance. Patient is responding well to physical therapy and should be ready for D/C in the upcoming weeks.     Pt did well today and reported no pain throughout. Did have some muscle tightness at the left side of his low back tightness that was resolved with paraspinal stretching. Tightness issue did not come up again in therapy today. Pt appears ready for increase in lifting restrictions soon; he will see MD mid next month.     Gurpreet is progressing well towards his goals.   Pt prognosis is Good.     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    Pt's spiritual, cultural and educational needs considered and pt agreeable to plan of care and goals.     Anticipated Barriers for therapy: chronicity of current injury    Goals:  Short Term Goals: 1 Week  1. Pt will increase lifting restriction per MD orders for continued functional training. - progressing  2. Pt will be able to maintain forearm plank for 1 full min to improve core endurance to facilitate lumbar spine support in extended standing activities. - Met     Long Term Goals: 3 weeks  1.  "Increase strength to >/= 5/5 MMT grade for BLE and core stability to increase tolerance for ADL and work activities. Met  2. Patient's goal: Return to gainful employment. To get this resolved 100%. Not Met, Ongoing  3. Pt will perform prone lumbar extension endurance test for 1 min to improve endurance of lumbar musculature to decrease risk of pain in prolonged standing. - Met  4. Pt will lift 50# box from floor to waist height table 10x to facilitate return to full duty employment, pending MD altering lifting restrictions. - progressing  5. Pt will carry 20# box 140 ft to simulate full duty work activities  - Met  6. Pt will perform 20 reps of 25# "chops" in half kneeling position on Airex pad to improve kneeling balance for return to work activities. - progressing  Pt will return to work full duty, full time.    Plan     Continue to advance PT towards established PT goals.      Outpatient Physical Therapy 3 times weekly for 3 weeks to include the following interventions: Aquatic Therapy, Cervical/Lumbar Traction, Electrical Stimulation IFC/Russian, Gait Training, Manual Therapy, Moist Heat/ Ice, Neuromuscular Re-ed, Orthotic Management and Training, Patient Education, Self Care, Therapeutic Activites and Therapeutic Exercise.      Upon discharge from further skilled PT intervention it will determined if the need for a work conditioning program or Functional Capacity Evaluation is required to allow the injured worker to return to work with full potential achieved, continued improvement with body mechanics with advanced functional activities, and further prevention of future work-related injuries.     Darinel Rabago, PT  10/26/2020    "

## 2020-10-27 ENCOUNTER — CLINICAL SUPPORT (OUTPATIENT)
Dept: REHABILITATION | Facility: HOSPITAL | Age: 30
End: 2020-10-27
Payer: OTHER GOVERNMENT

## 2020-10-27 DIAGNOSIS — R53.1 DECREASED STRENGTH: ICD-10-CM

## 2020-10-27 DIAGNOSIS — M54.50 ACUTE BILATERAL LOW BACK PAIN WITHOUT SCIATICA: ICD-10-CM

## 2020-10-27 DIAGNOSIS — M53.86 DECREASED ROM OF LUMBAR SPINE: ICD-10-CM

## 2020-10-27 PROCEDURE — 97110 THERAPEUTIC EXERCISES: CPT | Mod: PN,CQ

## 2020-10-27 NOTE — PROGRESS NOTES
"Physical Therapy Daily Treatment Note     [facility]  [facility] Number[facility] [id number]    Therapy Diagnosis:   Encounter Diagnoses   Name Primary?    Decreased ROM of lumbar spine     Decreased strength     Acute bilateral low back pain without sciatica      Physician: Andrew Aguirre Jr.,*    Visit Date: 10/27/2020    Physician Orders: PT Eval and Treat: Dinorah Protocol/technique, Desensitization  Medical Diagnosis from Referral:  M51.86 (ICD-10-CM) - Other intervertebral disc disorders, lumbar region  Evaluation Date: 7/23/2020  Authorization Period Expiration: 10/7/2020  Plan of Care Expiration: 10/16/2020  Visit # / Visits authorized: 18/24  FOTO: 8/10    Time In: 12:45 pm   Time Out: 1:30 pm  Total Appointment Time (timed & untimed codes): 40 minutes (3 TE)    Precautions: Standard    Subjective     Pt reports: pt agreeable to PT session   He was compliant with home exercise program.  Response to previous treatment: muscle soreness (DOMS)  Functional change: no increased pain    Pain: 0/10 .   Location: B mid Lower back straight across     Objective/Treatment     Gurpreet received therapeutic exercises to develop strength, endurance, ROM, flexibility and core stabilization for 40 minutes including:    - SL Plank hip abduction    2 x 10  - Retro TM - GTB around ankles - 0.8 mph  3'  - Lateral TM walk - GTB at knees/ankles  1' each   - 20# box lift - floor to waist to mat   2 x 10  - Leg press - 10/12/12 plates    3 x 10 DL  - Plank on bosu ball      10 x getting tennis ball in the middle   - Squats on bosu ball (dome down)   2 x 10 - UE use in the beginning   - HS stretch at stairs     4 x 15"    Home Exercises Provided and Patient Education Provided   Education provided:   - encouraged safety with lifting mechanism    Written Home Exercises Provided: Patient instructed to cont prior HEP.  Exercises were reviewed and Gurpreet was able to demonstrate them prior to the end of the session.  Gurpreet demonstrated " good  understanding of the education provided.     See EMR under Patient Instructions for exercises provided prior visit.    Assessment   Patient completed all therx with no adverse response, rest breaks granted as needed due to general fatigue. He performed all resisted therex with no reports of pain. Improved tolerance noted with planking on BOSU ball today. Deferred reverse ambulation on treadmill due to out of time.    Pt did well today and reported no pain throughout. Did have some muscle tightness at the left side of his low back tightness that was resolved with paraspinal stretching. Tightness issue did not come up again in therapy today. Pt appears ready for increase in lifting restrictions soon; he will see MD mid next month.     Gurpreet is progressing well towards his goals.   Pt prognosis is Good.     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    Pt's spiritual, cultural and educational needs considered and pt agreeable to plan of care and goals.     Anticipated Barriers for therapy: chronicity of current injury    Goals:  Short Term Goals: 1 Week  1. Pt will increase lifting restriction per MD orders for continued functional training. - progressing  2. Pt will be able to maintain forearm plank for 1 full min to improve core endurance to facilitate lumbar spine support in extended standing activities. - Met     Long Term Goals: 3 weeks  1. Increase strength to >/= 5/5 MMT grade for BLE and core stability to increase tolerance for ADL and work activities. Met  2. Patient's goal: Return to gainful employment. To get this resolved 100%. Not Met, Ongoing  3. Pt will perform prone lumbar extension endurance test for 1 min to improve endurance of lumbar musculature to  "decrease risk of pain in prolonged standing. - Met  4. Pt will lift 50# box from floor to waist height table 10x to facilitate return to full duty employment, pending MD altering lifting restrictions. - progressing  5. Pt will carry 20# box 140 ft to simulate full duty work activities  - Met  6. Pt will perform 20 reps of 25# "chops" in half kneeling position on Airex pad to improve kneeling balance for return to work activities. - progressing  Pt will return to work full duty, full time.    Plan     Continue to advance PT towards established PT goals.      Outpatient Physical Therapy 3 times weekly for 3 weeks to include the following interventions: Aquatic Therapy, Cervical/Lumbar Traction, Electrical Stimulation IFC/Russian, Gait Training, Manual Therapy, Moist Heat/ Ice, Neuromuscular Re-ed, Orthotic Management and Training, Patient Education, Self Care, Therapeutic Activites and Therapeutic Exercise.      Upon discharge from further skilled PT intervention it will determined if the need for a work conditioning program or Functional Capacity Evaluation is required to allow the injured worker to return to work with full potential achieved, continued improvement with body mechanics with advanced functional activities, and further prevention of future work-related injuries.     Jose Allen, PTA  10/28/2020    "

## 2020-11-09 ENCOUNTER — CLINICAL SUPPORT (OUTPATIENT)
Dept: REHABILITATION | Facility: HOSPITAL | Age: 30
End: 2020-11-09
Payer: OTHER GOVERNMENT

## 2020-11-09 DIAGNOSIS — M54.50 ACUTE BILATERAL LOW BACK PAIN WITHOUT SCIATICA: ICD-10-CM

## 2020-11-09 DIAGNOSIS — M53.86 DECREASED ROM OF LUMBAR SPINE: ICD-10-CM

## 2020-11-09 DIAGNOSIS — R53.1 DECREASED STRENGTH: ICD-10-CM

## 2020-11-09 PROCEDURE — 97110 THERAPEUTIC EXERCISES: CPT | Mod: PN

## 2020-11-09 NOTE — PROGRESS NOTES
Physical Therapy Daily Treatment Note     [facility]  [id number]    Therapy Diagnosis:   Encounter Diagnoses   Name Primary?    Decreased ROM of lumbar spine     Decreased strength     Acute bilateral low back pain without sciatica      Physician: Andrew Aguirre Jr.,*    Visit Date: 11/9/2020    Physician Orders: PT Eval and Treat: Dinorah Protocol/technique, OK to progress lifting slowly with supervision  Medical Diagnosis from Referral:  M51.86 (ICD-10-CM) - Other intervertebral disc disorders, lumbar region  Evaluation Date: 7/23/2020  Authorization Period Expiration: 10/7/2020  Plan of Care Expiration: updated today - 12/4/2020  Visit # / Visits authorized: 4/24  FOTO: at next POC    Time In: 3:05 pm   Time Out: 3:45 pm  Total Appointment Time (timed & untimed codes): 40 minutes (3 TE)    Precautions: Standard    Subjective     Pt reports: some L sided low back discomfort if he has to stand for longer than 30-60 minutes. He is unsure how long he can actually stand because he does not have to at work right now. No pain otherwise and feeling good, some muscle soreness (work out soreness) after therapy last visit.  He was compliant with home exercise program.  Response to previous treatment: muscle soreness (DOMS)  Functional change: no increased pain    Pain: 0/10 .   Location: B mid Lower back straight across     Objective/Treatment     Gurpreet received therapeutic exercises to develop strength, endurance, ROM, flexibility and core stabilization for 40 minutes including:    - Treadmill       10' at 3 MPH, 5% incline    - Retro TM - RTB around ankles - 0.8 mph  3'  - Lateral TM walk - RTB at knees/ankles -0.7 MPH 1.5' each  - 20# box lift - floor to waist to mat   2 x 10  - 20# box walks     3 laps timed  - Leg press - 10/12/12 plates    3 x 10 DL  - SL Plank hip abduction    2 x 10 - not done today  - Plank on bosu ball      10 x getting tennis ball in the middle - not done  "today  - Squats on bosu ball (dome down)   2 x 10 - UE use in the beginning  - not done today  - HS stretch at stairs     4 x 15" - not done today    Home Exercises Provided and Patient Education Provided   Education provided:   - encouraged safety with lifting mechanism    Written Home Exercises Provided: Patient instructed to cont prior HEP.  Exercises were reviewed and Gurpreet was able to demonstrate them prior to the end of the session.  Gurpreet demonstrated good  understanding of the education provided.     See EMR under Patient Instructions for exercises provided prior visit.    Assessment   Pt reported muscle fatigue after treadmill exercises and no low back pain. Cues to decreased B hip/leg ER in backwards walking more so than forwards walking. No pain with lifting/carrying exercises today and can slowly progress lifting weight with supervision and caution per MD orders.    Pt did well today and reported no pain throughout. Did have some muscle tightness at the left side of his low back tightness that was resolved with paraspinal stretching. Tightness issue did not come up again in therapy today. Pt appears ready for increase in lifting restrictions soon; he will see MD mid next month.     Gurpreet is progressing well towards his goals.   Pt prognosis is Good.     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    Pt's spiritual, cultural and educational needs considered and pt agreeable to plan of care and goals.     Anticipated Barriers for therapy: chronicity of current injury    Goals:  Short Term Goals: 1 Week  1. Pt will increase lifting restriction per MD orders for continued functional training. - Met  2. Pt will be able to maintain forearm plank for 1 full min to " "improve core endurance to facilitate lumbar spine support in extended standing activities. - Met     Long Term Goals: 3 weeks  1. Increase strength to >/= 5/5 MMT grade for BLE and core stability to increase tolerance for ADL and work activities. Met  2. Patient's goal: Return to gainful employment. To get this resolved 100%. Not Met, Ongoing  3. Pt will perform prone lumbar extension endurance test for 1 min to improve endurance of lumbar musculature to decrease risk of pain in prolonged standing. - Met  4. Pt will lift 50# box from floor to waist height table 10x to facilitate return to full duty employment, pending MD altering lifting restrictions. - progressing  5. Pt will carry 20# box 140 ft to simulate full duty work activities  - Met (updated to 30# box carry now)  6. Pt will perform 20 reps of 25# "chops" in half kneeling position on Airex pad to improve kneeling balance for return to work activities. - progressing  Pt will return to work full duty, full time.    Plan     Continue to advance PT towards established PT goals.      Outpatient Physical Therapy 3 times weekly for 3 weeks to include the following interventions: Aquatic Therapy, Cervical/Lumbar Traction, Electrical Stimulation IFC/Russian, Gait Training, Manual Therapy, Moist Heat/ Ice, Neuromuscular Re-ed, Orthotic Management and Training, Patient Education, Self Care, Therapeutic Activites and Therapeutic Exercise.      Upon discharge from further skilled PT intervention it will determined if the need for a work conditioning program or Functional Capacity Evaluation is required to allow the injured worker to return to work with full potential achieved, continued improvement with body mechanics with advanced functional activities, and further prevention of future work-related injuries.     Dagoberto Ambriz, PT  11/9/2020    "

## 2020-11-09 NOTE — PLAN OF CARE
Outpatient Therapy Updated Plan of Care     Visit Date: 11/9/2020  Name: Gurpreet Youngblood  Clinic Number: [id number]    Therapy Diagnosis:   Encounter Diagnoses   Name Primary?    Decreased ROM of lumbar spine     Decreased strength     Acute bilateral low back pain without sciatica      Physician: Andrew Aguirre Jr.,*    Physician Orders: PT Eval and Treat: Dinorah Protocol/technique, OK to progress lifting slowly with supervision  Medical Diagnosis from Referral:  M51.86 (ICD-10-CM) - Other intervertebral disc disorders, lumbar region  Evaluation Date: 7/23/2020  Total Visits Received: 20    Current Certification Period:  7/23/2020 to 9/4/2020  Precautions:  Standard, slowly progress lifting with supervision  Visits from Evaluation Date:  19  Functional Level Prior to Evaluation:  L sided low back pain    Subjective     Update: some L sided low back discomfort if he has to stand for longer than 30-60 minutes. He is unsure how long he can actually stand because he does not have to at work right now. No pain otherwise and feeling good, some muscle soreness (work out soreness) after therapy last visit.    Objective     Update:   A/PROM and MMT:  * = low back pain with testing  NT = Not tested  AROM: (degrees) LUMBAR   Flexion (40-50) 80%,B HS tightness   Extension (15-20) 100%   Right side bending (~20) 100%   Left side bending  (~20) 100%   Right rotation (5-10) 100%   Left rotation (5-10) 100%      Hip   Right     Left   Pain/Dysfunction with Movement     AROM PROM MMT AROM PROM MMT     Flexion (L2,120 deg) WFL WFL 5/5 WFL WFL 5/5     Extension (20 deg) WFL WFL 5/5 WFL WFL 4/5     Abduction (L5, 45 deg) WFL WFL 5/5 WFL WFL 5/5     Adduction (30 deg) WFL WFL 5/5 WFL WFL 5/5     IR (30 deg) WFL WFL 5/5 WFL WFL 5/5     ER (45 deg) WFL WFL 5/5 WFL WFL 5/5        Knee   Right     Left   Pain/Dysfunction with Movement     AROM PROM MMT AROM PROM MMT     Flexion (S2, 140 deg) WFL WFL 5/5 WFL WFL 5/5      Extension (L3, 0-5 deg) WFL WFL 5/5 WFL WFL 5/5        Ankle   Right     Left   Pain/Dysfunction with Movement     AROM PROM MMT AROM PROM MMT     Dorsiflexion (L4, 20 deg) WFL WFL 5/5 WFL WFL 5/5     Plantarflexion (S1, 50 deg) WFL WFL 5/5 WFL WFL 5/5     Inversion (20-30 deg) WFL WFL 5/5 WFL WFL 5/5     Eversion (5-10 deg) WFL WFL 5/5 WFL WFL 5/5     Great toe extension (L5, 70 deg) WFL WFL 5/5 WFL WFL 5/5        Lumbar Tests:  SLR Test (L4-S3) = negative B  Ely's test: negative B  Slump tests: negative B  Quadrant testing: negative for all  Plank time: 1 minute  Prone lumbar ext endurance test: 1' without UE use     Palpation: low grade L paraspinal and lateral to paraspinal musculature tenderness to palpation at L3-L4 level     Functional Job Specific Testing: Current lifting and carrying restrictions set at 20#  - 20# box lift from floor to table, 30x  - 20# box carry, 3 laps of 140' in 1 min and 26 secs; (was 2 mins and 11 seconds)    Assessment     Update: Pt progressing in all areas of therapy towards long term goals. Pt does have inconsistent L lumbar paraspinal tenderness that is present for the first time since evaluation. Pt able to complete all exercises well in therapy without any discomfort other than muscle fatigue. BLE and core/lumbar strength has progressed well at this point and only tight hamstrings B limiting lumbar flexion with no pain. Will slowly increase lifting tolerance per MD orders to progress towards full duty work conditions.     Previous Short Term Goals Status:     Short Term Goals: 1 Week  1. Pt will increase lifting restriction per MD orders for continued functional training. - Met  2. Pt will be able to maintain forearm plank for 1 full min to improve core endurance to facilitate lumbar spine support in extended standing activities. - Met     Long Term Goals: 3 weeks  1. Increase strength to >/= 5/5 MMT grade for BLE and core stability to increase tolerance for ADL and work  "activities. Met  2. Patient's goal: Return to gainful employment. To get this resolved 100%. Not Met, Ongoing  3. Pt will perform prone lumbar extension endurance test for 1 min to improve endurance of lumbar musculature to decrease risk of pain in prolonged standing. - Met  4. Pt will lift 50# box from floor to waist height table 10x to facilitate return to full duty employment, pending MD altering lifting restrictions. - progressing  5. Pt will carry 20# box 140 ft to simulate full duty work activities  - Met (updated to 30# box carry now)  6. Pt will perform 20 reps of 25# "chops" in half kneeling position on Airex pad to improve kneeling balance for return to work activities. - progressing  Pt will return to work full duty, full time.  Long Term Goal Status:   continue per initial plan of care.  Reasons for Recertification of Therapy:   UPOC    Plan     Updated Certification Period: 11/9/2020 to 12/4/2020  Recommended Treatment Plan: 3 times per week for 3 weeks: Aquatic Therapy, Cervical/Lumbar Traction, Electrical Stimulation IFC/Russian, Gait Training, Manual Therapy, Moist Heat/ Ice, Neuromuscular Re-ed, Orthotic Management and Training, Patient Education, Self Care, Therapeutic Activites and Therapeutic Exercise  Other Recommendations: None    Dagoberto Ambriz, PT  11/9/2020      I CERTIFY THE NEED FOR THESE SERVICES FURNISHED UNDER THIS PLAN OF TREATMENT AND WHILE UNDER MY CARE    Physician's comments:        Physician's Signature: ___________________________________________________    "

## 2020-11-10 ENCOUNTER — CLINICAL SUPPORT (OUTPATIENT)
Dept: REHABILITATION | Facility: HOSPITAL | Age: 30
End: 2020-11-10
Payer: OTHER GOVERNMENT

## 2020-11-10 DIAGNOSIS — R53.1 DECREASED STRENGTH: ICD-10-CM

## 2020-11-10 DIAGNOSIS — M54.50 ACUTE BILATERAL LOW BACK PAIN WITHOUT SCIATICA: ICD-10-CM

## 2020-11-10 DIAGNOSIS — M53.86 DECREASED ROM OF LUMBAR SPINE: ICD-10-CM

## 2020-11-10 PROCEDURE — 97110 THERAPEUTIC EXERCISES: CPT | Mod: PN,CQ

## 2020-11-10 NOTE — PROGRESS NOTES
"Physical Therapy Daily Treatment Note     [facility]  [facility] Number[facility] [id number]      Therapy Diagnosis:        Encounter Diagnoses   Name Primary?    Decreased ROM of lumbar spine      Decreased strength      Acute bilateral low back pain without sciatica        Physician: Andrew Aguirre Jr.,*    Visit Date: 11/10/2020    Physician Orders: PT Eval and Treat: Dinorah Protocol/technique, Desensitization  Medical Diagnosis from Referral:  M51.86 (ICD-10-CM) - Other intervertebral disc disorders, lumbar region  Evaluation Date: 7/23/2020  Authorization Period Expiration: 10/7/2020  Plan of Care Expiration: 10/16/2020  Visit # / Visits authorized: 20/24  FOTO: 10/ @DC (Completed 11/10)    Time In: 4:15  Time Out: 5:00  Total Appointment Time (timed & untimed codes): 45 minutes (3 TE)    Precautions: Standard    Subjective     Pt reports: just coming from work. Pretty sedentary at work today.  He was compliant with home exercise program.  Response to previous treatment: muscle soreness (DOMS)  Functional change: Ongoing    Pain: 0/10 .   Location: B mid Lower back straight across     Objective/Treatment     Gurpreet received therapeutic exercises to develop strength, endurance, ROM, flexibility and core stabilization for 45 minutes including:    - SL Plank hip abduction    2 x 10  - Retro TM - RTB around ankles - 0.8 mph  3'   - Lateral TM walk - RTB at knees/ankles -0.7 MPH 3'  each   - 20# box lift - floor to waist to mat   2 x 10   - Leg press - 10.5/12.5/12.5 plates   3 x 10 DL  - Plank on bosu ball      2 45 sec trials of circles w/2kg med ball CW/CCW  - Squats on bosu ball (dome down)   2 x 10 - No UE sup   - HS stretch at stairs     3x30"  Cues for improved isolation of stretch     Home Exercises Provided and Patient Education Provided   Education provided:   - encouraged safety with lifting mechanism    Written Home Exercises Provided: Patient instructed to cont prior HEP.  Exercises were " reviewed and Gurpreet was able to demonstrate them prior to the end of the session.  Gurpreet demonstrated good  understanding of the education provided.     See EMR under Patient Instructions for exercises provided prior visit.    Assessment   Patient completed all therapeutic exercises with good tolerance. Brief rest breaks between trials sets.  Increased resistance as noted in bold.  Improved tolerance (45 sec x 2) noted with planks on BOSU ball today. Recsumed  reverse ambulation on treadmill     Pt did well today and reported no pain throughout. Did have some muscle tightness at the left side of his low back tightness that was resolved with paraspinal stretching. Tightness issue did not come up again in therapy today. Pt appears ready for increase in lifting restrictions soon; he will see MD mid next month.     Gurpreet is progressing well towards his goals.   Pt prognosis is Good.     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    Pt's spiritual, cultural and educational needs considered and pt agreeable to plan of care and goals.     Anticipated Barriers for therapy: chronicity of current injury    Goals:  Short Term Goals: 1 Week  1. Pt will increase lifting restriction per MD orders for continued functional training. - progressing  2. Pt will be able to maintain forearm plank for 1 full min to improve core endurance to facilitate lumbar spine support in extended standing activities. - Met     Long Term Goals: 3 weeks  1. Increase strength to >/= 5/5 MMT grade for BLE and core stability to increase tolerance for ADL and work activities. Met  2. Patient's goal: Return to gainful employment. To get this resolved 100%. Not Met, Ongoing  3. Pt will perform prone lumbar extension  "endurance test for 1 min to improve endurance of lumbar musculature to decrease risk of pain in prolonged standing. - Met  4. Pt will lift 50# box from floor to waist height table 10x to facilitate return to full duty employment, pending MD altering lifting restrictions. - progressing  5. Pt will carry 20# box 140 ft to simulate full duty work activities  - Met  6. Pt will perform 20 reps of 25# "chops" in half kneeling position on Airex pad to improve kneeling balance for return to work activities. - progressing  Pt will return to work full duty, full time.    Plan     Continue to advance PT towards established PT goals.      Outpatient Physical Therapy 3 times weekly for 3 weeks to include the following interventions: Aquatic Therapy, Cervical/Lumbar Traction, Electrical Stimulation IFC/Russian, Gait Training, Manual Therapy, Moist Heat/ Ice, Neuromuscular Re-ed, Orthotic Management and Training, Patient Education, Self Care, Therapeutic Activites and Therapeutic Exercise.      Upon discharge from further skilled PT intervention it will determined if the need for a work conditioning program or Functional Capacity Evaluation is required to allow the injured worker to return to work with full potential achieved, continued improvement with body mechanics with advanced functional activities, and further prevention of future work-related injuries.     Adelfo Lu, PTA  11/10/2020    "

## 2020-11-16 ENCOUNTER — CLINICAL SUPPORT (OUTPATIENT)
Dept: REHABILITATION | Facility: HOSPITAL | Age: 30
End: 2020-11-16
Payer: OTHER GOVERNMENT

## 2020-11-16 DIAGNOSIS — M54.50 ACUTE BILATERAL LOW BACK PAIN WITHOUT SCIATICA: ICD-10-CM

## 2020-11-16 DIAGNOSIS — R53.1 DECREASED STRENGTH: ICD-10-CM

## 2020-11-16 DIAGNOSIS — M53.86 DECREASED ROM OF LUMBAR SPINE: ICD-10-CM

## 2020-11-16 PROCEDURE — 97110 THERAPEUTIC EXERCISES: CPT | Mod: PN,CQ

## 2020-11-16 NOTE — PROGRESS NOTES
"Physical Therapy Daily Treatment Note     [facility]  [facility] Number: [id number]      Therapy Diagnosis:        Encounter Diagnoses   Name Primary?    Decreased ROM of lumbar spine      Decreased strength      Acute bilateral low back pain without sciatica        Physician: Andrew Aguirre Jr.,*    Visit Date: 11/16/2020    Physician Orders: PT Eval and Treat: Dinorah Protocol/technique, Desensitization  Medical Diagnosis from Referral:  M51.86 (ICD-10-CM) - Other intervertebral disc disorders, lumbar region  Evaluation Date: 7/23/2020  Authorization Period Expiration: 10/7/2020  Plan of Care Expiration: 12/4/2020  Visit # / Visits authorized: 21/24  FOTO:  @DC    Time In: 4:00 PM  Time Out: 4:45 PM  Total Appointment Time (timed & untimed codes): 45 minutes (3 TE)    Precautions: Standard    Subjective     Pt reports: agreeable to PT session. No reports of new pain per pt.  He was compliant with home exercise program.  Response to previous treatment:hamstring soreness   Functional change: Ongoing    Pain: 0/10 .   Location: B mid Lower back straight across     Objective/Treatment     Gurpreet received therapeutic exercises to develop strength, endurance, ROM, flexibility and core stabilization for 45 minutes including:    - SL Plank hip abduction    2 x 10  - Retro TM - RTB around ankles - 0.8 mph  3'   - Lateral TM walk - RTB at knees/ankles -0.7 MPH 3'  each   - 20# box lift - floor to waist to mat   2 x 10 OUT OF TIME  - Leg press - 10.5/12.5/12.5 plates   3 x 10 DL  - Plank on bosu ball      2 45 sec trials of circles w/2kg med ball CW/CCW  - Squats on bosu ball (dome down)   2 x 10 - No UE sup   - HS stretch at stairs     3x30"  Cues for improved isolation of stretch     Home Exercises Provided and Patient Education Provided   Education provided:   - encouraged safety with lifting mechanism    Written Home Exercises Provided: Patient instructed to cont prior HEP.  Exercises were reviewed and " Gurpreet was able to demonstrate them prior to the end of the session.  Gurpreet demonstrated good  understanding of the education provided.     See EMR under Patient Instructions for exercises provided prior visit.    Assessment   Pt able to perform session with no reports of pain, he did require rest breaks due to appropriate level of fatigue. Deferred crate lifting today due to running out of time. Pt demonstrates good tolerance to session and able to follow verbal instructions on technique and safety.        Gurpreet is progressing well towards his goals.   Pt prognosis is Good.     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    Pt's spiritual, cultural and educational needs considered and pt agreeable to plan of care and goals.     Anticipated Barriers for therapy: chronicity of current injury    Goals:  Short Term Goals: 1 Week  1. Pt will increase lifting restriction per MD orders for continued functional training. - progressing  2. Pt will be able to maintain forearm plank for 1 full min to improve core endurance to facilitate lumbar spine support in extended standing activities. - Met     Long Term Goals: 3 weeks  1. Increase strength to >/= 5/5 MMT grade for BLE and core stability to increase tolerance for ADL and work activities. Met  2. Patient's goal: Return to gainful employment. To get this resolved 100%. Not Met, Ongoing  3. Pt will perform prone lumbar extension endurance test for 1 min to improve endurance of lumbar musculature to decrease risk of pain in prolonged standing. - Met  4. Pt will lift 50# box from floor to waist height table 10x to facilitate return to full duty employment, pending MD altering lifting restrictions. - progressing  5. Pt will carry 20# box  "140 ft to simulate full duty work activities  - Met  6. Pt will perform 20 reps of 25# "chops" in half kneeling position on Airex pad to improve kneeling balance for return to work activities. - progressing  Pt will return to work full duty, full time.    Plan     Continue to advance PT towards established PT goals.      Outpatient Physical Therapy 3 times weekly for 3 weeks to include the following interventions: Aquatic Therapy, Cervical/Lumbar Traction, Electrical Stimulation IFC/Russian, Gait Training, Manual Therapy, Moist Heat/ Ice, Neuromuscular Re-ed, Orthotic Management and Training, Patient Education, Self Care, Therapeutic Activites and Therapeutic Exercise.      Upon discharge from further skilled PT intervention it will determined if the need for a work conditioning program or Functional Capacity Evaluation is required to allow the injured worker to return to work with full potential achieved, continued improvement with body mechanics with advanced functional activities, and further prevention of future work-related injuries.     Jose Allen, PTA  11/16/2020    "

## 2020-11-20 ENCOUNTER — CLINICAL SUPPORT (OUTPATIENT)
Dept: REHABILITATION | Facility: HOSPITAL | Age: 30
End: 2020-11-20
Payer: OTHER GOVERNMENT

## 2020-11-20 DIAGNOSIS — M54.50 ACUTE BILATERAL LOW BACK PAIN WITHOUT SCIATICA: ICD-10-CM

## 2020-11-20 DIAGNOSIS — R53.1 DECREASED STRENGTH: ICD-10-CM

## 2020-11-20 DIAGNOSIS — M53.86 DECREASED ROM OF LUMBAR SPINE: ICD-10-CM

## 2020-11-20 PROCEDURE — 97110 THERAPEUTIC EXERCISES: CPT | Mod: PN

## 2020-11-20 NOTE — PROGRESS NOTES
Physical Therapy Daily Treatment Note     [facility]  [facility] [id number]      Therapy Diagnosis:        Encounter Diagnoses   Name Primary?    Decreased ROM of lumbar spine      Decreased strength      Acute bilateral low back pain without sciatica        Physician: Andrew Aguirre Jr.,*    Visit Date: 11/20/2020    Physician Orders: PT Eval and Treat: Dinorah Protocol/technique, Desensitization  Medical Diagnosis from Referral:  M51.86 (ICD-10-CM) - Other intervertebral disc disorders, lumbar region  Evaluation Date: 7/23/2020  Authorization Period Expiration: 10/7/2020  Plan of Care Expiration: 12/4/2020  Visit # / Visits authorized: 22/24  FOTO:  @DC    Time In: 3:00 PM  Time Out: 3:45 PM  Total Appointment Time (timed & untimed codes): 45 minutes (3 TE)    Precautions: Standard    Subjective     Pt reports: he had some minor stiffness on Tuesday but he is not having any pain today. Discussed with patient about how he felt towards continuing physical therapy as he has not reported pain since 9/4/2020 and patient reported he feels he needs to lift 70# before he feels ready for D/C  He was compliant with home exercise program.  Response to previous treatment:hamstring soreness   Functional change: Ongoing    Pain: 0/10 .   Location: B mid Lower back straight across     Objective/Treatment     Gurpreet received therapeutic exercises to develop strength, endurance, ROM, flexibility and core stabilization for 45 minutes including:    - SL Plank hip abduction    2 x 10  - Retro TM - BLTB around ankles - 0.8 mph  3'   - 20# box lift - floor to waist to mat   20 x  - Leg press - 10/12/14 plates    3 x 10 DL  - Plank on bosu ball      10 x with tennis ball  - Squats on bosu ball (dome down)   2 x 10 - No UE sup   - Lumbar EXT over swiss ball    2 x 10  - Standing hip EXT - GTB    2 x 10      Home Exercises Provided and Patient Education Provided   Education provided:   - encouraged safety with  lifting mechanism    Written Home Exercises Provided: Patient instructed to cont prior HEP.  Exercises were reviewed and Gurpreet was able to demonstrate them prior to the end of the session.  Gurpreet demonstrated good  understanding of the education provided.     See EMR under Patient Instructions for exercises provided prior visit.    Assessment   Patient presented to physical therapy today with reports of 0/10 low back pain. Discussed with patient about how he felt towards continuing physical therapy as he has not reported any pain since 9/4/2020. Patient expressed he does feel he needs to continue therapy until he has lifted 70#; although, MD has kept lifting restrictions at 20# at this time. Patient continues to tolerate high level therapeutic exercises focusing on core stability and lower extremity strengthening without reports of increased pain.    Gurpreet is progressing well towards his goals.   Pt prognosis is Good.     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    Pt's spiritual, cultural and educational needs considered and pt agreeable to plan of care and goals.     Anticipated Barriers for therapy: chronicity of current injury    Goals:  Short Term Goals: 1 Week  1. Pt will increase lifting restriction per MD orders for continued functional training. - progressing  2. Pt will be able to maintain forearm plank for 1 full min to improve core endurance to facilitate lumbar spine support in extended standing activities. - Met     Long Term Goals: 3 weeks  1. Increase strength to >/= 5/5 MMT grade for BLE and core stability to increase tolerance for ADL and work activities. Met  2. Patient's goal: Return to gainful employment. To get this resolved 100%. Not Met, Ongoing  3.  "Pt will perform prone lumbar extension endurance test for 1 min to improve endurance of lumbar musculature to decrease risk of pain in prolonged standing. - Met  4. Pt will lift 50# box from floor to waist height table 10x to facilitate return to full duty employment, pending MD altering lifting restrictions. - progressing  5. Pt will carry 20# box 140 ft to simulate full duty work activities  - Met  6. Pt will perform 20 reps of 25# "chops" in half kneeling position on Airex pad to improve kneeling balance for return to work activities. - progressing  Pt will return to work full duty, full time.    Plan     Continue to advance PT towards established PT goals.      Outpatient Physical Therapy 3 times weekly for 3 weeks to include the following interventions: Aquatic Therapy, Cervical/Lumbar Traction, Electrical Stimulation IFC/Russian, Gait Training, Manual Therapy, Moist Heat/ Ice, Neuromuscular Re-ed, Orthotic Management and Training, Patient Education, Self Care, Therapeutic Activites and Therapeutic Exercise.      Upon discharge from further skilled PT intervention it will determined if the need for a work conditioning program or Functional Capacity Evaluation is required to allow the injured worker to return to work with full potential achieved, continued improvement with body mechanics with advanced functional activities, and further prevention of future work-related injuries.     Darinel Rabago, PT  11/20/2020      "

## 2020-11-23 ENCOUNTER — CLINICAL SUPPORT (OUTPATIENT)
Dept: REHABILITATION | Facility: HOSPITAL | Age: 30
End: 2020-11-23
Payer: OTHER GOVERNMENT

## 2020-11-23 DIAGNOSIS — R53.1 DECREASED STRENGTH: ICD-10-CM

## 2020-11-23 DIAGNOSIS — M54.50 ACUTE BILATERAL LOW BACK PAIN WITHOUT SCIATICA: ICD-10-CM

## 2020-11-23 DIAGNOSIS — M53.86 DECREASED ROM OF LUMBAR SPINE: ICD-10-CM

## 2020-11-23 PROCEDURE — 97110 THERAPEUTIC EXERCISES: CPT | Mod: PN

## 2020-11-23 NOTE — PROGRESS NOTES
Physical Therapy Daily Treatment Note     [facility]  [facility] Number[facility] [id number]      Therapy Diagnosis:        Encounter Diagnoses   Name Primary?    Decreased ROM of lumbar spine      Decreased strength      Acute bilateral low back pain without sciatica        Physician: Andrew Aguirre Jr.,*    Visit Date: 11/23/2020    Physician Orders: PT Eval and Treat: Dinorah Protocol/technique, Desensitization  Medical Diagnosis from Referral:  M51.86 (ICD-10-CM) - Other intervertebral disc disorders, lumbar region  Evaluation Date: 7/23/2020  Authorization Period Expiration: 10/7/2020  Plan of Care Expiration: 12/4/2020  Visit # / Visits authorized: 23/24  FOTO:  @DC    Time In: 3:45 PM  Time Out: 4:30 PM  Total Appointment Time (timed & untimed codes): 45 minutes (3 TE)    Precautions: Standard    Subjective     Pt reports: he had some soreness/sitffness on Saturday and Sunday, which he attributes to standing at work for extended amounts of time with increased work demands. Patient reported his back felt good when he woke up today. Patient also reported MD increased lifting restrictions to 30#  He was compliant with home exercise program.  Response to previous treatment:hamstring soreness   Functional change: Ongoing    Pain: 0/10 .   Location: B mid Lower back straight across     Objective/Treatment     Gurpreet received therapeutic exercises to develop strength, endurance, ROM, flexibility and core stabilization for 45 minutes including:    - LTR       20 x   - SL Plank hip abduction    2 x 10  - Retro TM - BLTB around ankles - 0.8 mph  3'   - 30# box lift - floor to waist to mat   20 x  - Leg press - 10/12/14 plates    3 x 10 DL  - Plank on bosu ball      10 x with tennis ball  - Standing hip EXT - GTB    2 x 10  - Standing hip ABD - GTB    2 x 10      Home Exercises Provided and Patient Education Provided   Education provided:   - encouraged safety with lifting mechanism    Written Home Exercises  Provided: Patient instructed to cont prior HEP.  Exercises were reviewed and Gurpreet was able to demonstrate them prior to the end of the session.  Gurpreet demonstrated good  understanding of the education provided.     See EMR under Patient Instructions for exercises provided prior visit.    Assessment   Patient presented to physical therapy today with reports of 0/10 low back pain. Patient had increased soreness following last visit; although, he reported pain/soreness subsided after a couple of days. Discussed with patient about lifting restrictions, and he reported MD responded to his email stating lifting restrictions can be increased from 20# to 30# at this time. Patient continues to tolerate high level therapeutic exercises focusing on core stability and lower extremity strengthening without reports of increased pain.    Gurpreet is progressing well towards his goals.   Pt prognosis is Good.     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    Pt's spiritual, cultural and educational needs considered and pt agreeable to plan of care and goals.     Anticipated Barriers for therapy: chronicity of current injury    Goals:  Short Term Goals: 1 Week  1. Pt will increase lifting restriction per MD orders for continued functional training. - progressing  2. Pt will be able to maintain forearm plank for 1 full min to improve core endurance to facilitate lumbar spine support in extended standing activities. - Met     Long Term Goals: 3 weeks  1. Increase strength to >/= 5/5 MMT grade for BLE and core stability to increase tolerance for ADL and work activities. Met  2. Patient's goal: Return to gainful employment. To get this resolved 100%. Not Met, Ongoing  3. Pt will perform prone  "lumbar extension endurance test for 1 min to improve endurance of lumbar musculature to decrease risk of pain in prolonged standing. - Met  4. Pt will lift 50# box from floor to waist height table 10x to facilitate return to full duty employment, pending MD altering lifting restrictions. - progressing  5. Pt will carry 20# box 140 ft to simulate full duty work activities  - Met  6. Pt will perform 20 reps of 25# "chops" in half kneeling position on Airex pad to improve kneeling balance for return to work activities. - progressing  Pt will return to work full duty, full time.    Plan     Continue to advance PT towards established PT goals.      Outpatient Physical Therapy 3 times weekly for 3 weeks to include the following interventions: Aquatic Therapy, Cervical/Lumbar Traction, Electrical Stimulation IFC/Russian, Gait Training, Manual Therapy, Moist Heat/ Ice, Neuromuscular Re-ed, Orthotic Management and Training, Patient Education, Self Care, Therapeutic Activites and Therapeutic Exercise.      Upon discharge from further skilled PT intervention it will determined if the need for a work conditioning program or Functional Capacity Evaluation is required to allow the injured worker to return to work with full potential achieved, continued improvement with body mechanics with advanced functional activities, and further prevention of future work-related injuries.     Darinel Rabago, PT  11/23/2020      "

## 2020-11-24 ENCOUNTER — CLINICAL SUPPORT (OUTPATIENT)
Dept: REHABILITATION | Facility: HOSPITAL | Age: 30
End: 2020-11-24
Payer: OTHER GOVERNMENT

## 2020-11-24 DIAGNOSIS — R53.1 DECREASED STRENGTH: ICD-10-CM

## 2020-11-24 DIAGNOSIS — M54.50 ACUTE BILATERAL LOW BACK PAIN WITHOUT SCIATICA: ICD-10-CM

## 2020-11-24 DIAGNOSIS — M53.86 DECREASED ROM OF LUMBAR SPINE: ICD-10-CM

## 2020-11-24 PROCEDURE — 97110 THERAPEUTIC EXERCISES: CPT | Mod: PN

## 2020-11-24 NOTE — PROGRESS NOTES
"[facility]  Physical Therapy Initial Evaluation    Date: 11/24/2020   [facility]  [facility] Number: [id number]    Therapy Diagnosis:   Encounter Diagnoses   Name Primary?    Decreased ROM of lumbar spine     Decreased strength     Acute bilateral low back pain without sciatica      Physician: Andrew Aguirre Jr.,*    [id number]  Visit Date: 11/24/2020    Physician Orders: PT Eval and Treat: Dinorah Protocol/technique, Desensitization  Medical Diagnosis from Referral:  M51.86 (ICD-10-CM) - Other intervertebral disc disorders, lumbar region  Evaluation Date: 7/23/2020  Authorization Period Expiration: 10/7/2020  Plan of Care Expiration: 12/4/2020  Visit # / Visits authorized: 24/24  FOTO:  @DC    Time In: 3:45 PM  Time Out: 4:30 PM  Total Appointment Time (timed & untimed codes): 45 minutes (3 TE)    Precautions: Standard    Subjective     Pt reports: he had a little bit of low back soreness earlier today, but he did the stretches provided last visit which reduced tightness  He was compliant with home exercise program.  Response to previous treatment: low back soreness  Functional change: Ongoing    Pain: 0/10 .   Location: B mid Lower back straight across     Objective/Treatment     Gurpreet received therapeutic exercises to develop strength, endurance, ROM, flexibility and core stabilization for 45 minutes including:    - LTR       20 x   - SKTC      10" x 5  - SL Plank hip abduction    2 x 10  - Retro TM - BTB around ankles - 0.8 mph  4'   - Lateral TM walk - BTB - 0.8 mph   2' each  - 30# box lift - floor to waist to mat   20 x  - Leg press - 10/12/14 plates    3 x 10 DL  - Plank on bosu ball      10 x with tennis ball    NOT TODAY:  - Standing hip EXT - GTB    2 x 10  - Standing hip ABD - GTB    2 x 10      Home Exercises Provided and Patient Education Provided   Education provided:   - encouraged safety with lifting " mechanism    Written Home Exercises Provided: Patient instructed to cont prior HEP.  Exercises were reviewed and Gurpreet was able to demonstrate them prior to the end of the session.  Gurpreet demonstrated good  understanding of the education provided.     See EMR under Patient Instructions for exercises provided prior visit.    Assessment   Patient presented to physical therapy today with reports of 0/10 low back pain. Patient reported he had some low back soreness earlier today, which eventually subsided after performing stretches provided last visit. Currently lifting restrictions set at 30#. Patient continues to tolerate high level therapeutic exercises focusing on core stability and lower extremity strengthening without reports of increased pain.    Gurpreet is progressing well towards his goals.   Pt prognosis is Good.     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    Pt's spiritual, cultural and educational needs considered and pt agreeable to plan of care and goals.     Anticipated Barriers for therapy: chronicity of current injury    Goals:  Short Term Goals: 1 Week  1. Pt will increase lifting restriction per MD orders for continued functional training. - progressing  2. Pt will be able to maintain forearm plank for 1 full min to improve core endurance to facilitate lumbar spine support in extended standing activities. - Met     Long Term Goals: 3 weeks  1. Increase strength to >/= 5/5 MMT grade for BLE and core stability to increase tolerance for ADL and work activities. Met  2. Patient's goal: Return to gainful employment. To get this resolved 100%. Not Met, Ongoing  3. Pt will perform prone lumbar extension endurance test for 1 min to improve endurance of lumbar  "musculature to decrease risk of pain in prolonged standing. - Met  4. Pt will lift 50# box from floor to waist height table 10x to facilitate return to full duty employment, pending MD altering lifting restrictions. - progressing  5. Pt will carry 20# box 140 ft to simulate full duty work activities  - Met  6. Pt will perform 20 reps of 25# "chops" in half kneeling position on Airex pad to improve kneeling balance for return to work activities. - progressing  Pt will return to work full duty, full time.    Plan     Continue to advance PT towards established PT goals.      Outpatient Physical Therapy 3 times weekly for 3 weeks to include the following interventions: Aquatic Therapy, Cervical/Lumbar Traction, Electrical Stimulation IFC/Russian, Gait Training, Manual Therapy, Moist Heat/ Ice, Neuromuscular Re-ed, Orthotic Management and Training, Patient Education, Self Care, Therapeutic Activites and Therapeutic Exercise.      Upon discharge from further skilled PT intervention it will determined if the need for a work conditioning program or Functional Capacity Evaluation is required to allow the injured worker to return to work with full potential achieved, continued improvement with body mechanics with advanced functional activities, and further prevention of future work-related injuries.     Darinel Rabago, PT  11/24/2020      "

## 2020-12-07 ENCOUNTER — CLINICAL SUPPORT (OUTPATIENT)
Dept: REHABILITATION | Facility: HOSPITAL | Age: 30
End: 2020-12-07
Payer: OTHER GOVERNMENT

## 2020-12-07 DIAGNOSIS — M53.86 DECREASED ROM OF LUMBAR SPINE: ICD-10-CM

## 2020-12-07 DIAGNOSIS — R53.1 DECREASED STRENGTH: ICD-10-CM

## 2020-12-07 DIAGNOSIS — M54.50 ACUTE BILATERAL LOW BACK PAIN WITHOUT SCIATICA: ICD-10-CM

## 2020-12-07 PROCEDURE — 97110 THERAPEUTIC EXERCISES: CPT | Mod: PN

## 2020-12-07 NOTE — PROGRESS NOTES
"[facility]  Physical Therapy Initial Evaluation    Date: 12/7/2020   [facility]  [facility] Number[id number]    Therapy Diagnosis:   Encounter Diagnoses   Name Primary?    Decreased ROM of lumbar spine     Decreased strength     Acute bilateral low back pain without sciatica      Physician: Andrew Aguirre Jr.,*    [id number]  Visit Date: 12/7/2020    Physician Orders: PT Eval and Treat: Dinorah Protocol/technique, Desensitization  Medical Diagnosis from Referral:  M51.86 (ICD-10-CM) - Other intervertebral disc disorders, lumbar region  Evaluation Date: 7/23/2020  Authorization Period Expiration: 12/30/2020  Plan of Care Expiration: 1/29/2021  Visit # / Visits authorized: 9/24 (33 total)  FOTO:  @DC    Time In: 3:45 PM  Time Out: 4:30 PM  Total Appointment Time (timed & untimed codes): 45 minutes (3 TE)    Precautions: Standard    Subjective     Pt reports: he had a little bit of low back soreness earlier today, but he did the stretches provided last visit which reduced tightness  He was compliant with home exercise program.  Response to previous treatment: low back soreness  Functional change: Ongoing    Pain: 0/10 .   Location: B mid Lower back straight across     Objective/Treatment     Gurpreet received therapeutic exercises to develop strength, endurance, ROM, flexibility and core stabilization for 45 minutes including:    - LTR       20 x   - Full foam roll core series   Alternating unilateral flies - 5#   15 x    Hip ABD - GTB    5" x 15   Hip ADD - ball squeeze   5" x 15   Alternating marching    15 x   - Retro TM - BTB around ankles - 0.8 mph  4'   - Lateral TM walk - BTB - 0.8 mph   2' each  - 30# box lift - floor to waist to mat   20 x  - Leg press - 11/13/15 plates    3 x 10 DL  - Plank on bosu ball      10 x with tennis ball        Home Exercises Provided and Patient Education Provided   Education provided:   - " encouraged safety with lifting mechanism    Written Home Exercises Provided: Patient instructed to cont prior HEP.  Exercises were reviewed and Gurpreet was able to demonstrate them prior to the end of the session.  Gurpreet demonstrated good  understanding of the education provided.     See EMR under Patient Instructions for exercises provided prior visit.    Assessment   Patient presented to physical therapy today with reports of 0/10 low back pain. Currently lifting restrictions still set at 30#. Patient continues to tolerate high level therapeutic exercises focusing on core stability and lower extremity strengthening without reports of increased pain. Patient's plan of care was updated along with objective tests and measures today. Patient has now been in physical therapy for 1 year and has shown significant improvements. Patient now displays full ROM and strength along with negative provocative testing of the lumbar spine. Patient has also met all of his goals and is ready for D/C from physical therapy upon MD approval.     Gurpreet is progressing well towards his goals.   Pt prognosis is Good.     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    Pt's spiritual, cultural and educational needs considered and pt agreeable to plan of care and goals.     Anticipated Barriers for therapy: chronicity of current injury    Goals:  Short Term Goals: 1 Week  1. Pt will increase lifting restriction per MD orders for continued functional training. - progressing  2. Pt will be able to maintain forearm plank for 1 full min to improve core endurance to facilitate lumbar spine support in extended standing activities. - Met     Long Term Goals: 3 weeks  1. Increase strength to >/= 5/5 MMT grade  "for BLE and core stability to increase tolerance for ADL and work activities. Met  2. Patient's goal: Return to gainful employment. To get this resolved 100%. Not Met, Ongoing  3. Pt will perform prone lumbar extension endurance test for 1 min to improve endurance of lumbar musculature to decrease risk of pain in prolonged standing. - Met  4. Pt will lift 50# box from floor to waist height table 10x to facilitate return to full duty employment, pending MD altering lifting restrictions. - progressing  5. Pt will carry 20# box 140 ft to simulate full duty work activities  - Met  6. Pt will perform 20 reps of 25# "chops" in half kneeling position on Airex pad to improve kneeling balance for return to work activities. - progressing  Pt will return to work full duty, full time.    Plan     Continue to advance PT towards established PT goals.      Outpatient Physical Therapy 3 times weekly for 3 weeks to include the following interventions: Aquatic Therapy, Cervical/Lumbar Traction, Electrical Stimulation IFC/Russian, Gait Training, Manual Therapy, Moist Heat/ Ice, Neuromuscular Re-ed, Orthotic Management and Training, Patient Education, Self Care, Therapeutic Activites and Therapeutic Exercise.      Upon discharge from further skilled PT intervention it will determined if the need for a work conditioning program or Functional Capacity Evaluation is required to allow the injured worker to return to work with full potential achieved, continued improvement with body mechanics with advanced functional activities, and further prevention of future work-related injuries.     Darinel Rabago, PT  12/7/2020      "

## 2020-12-07 NOTE — PLAN OF CARE
Outpatient Therapy Updated Plan of Care     Visit Date: 12/7/2020  Name: Gurpreet Youngblood  Clinic Number: [id number]    Therapy Diagnosis:   Encounter Diagnoses   Name Primary?    Decreased ROM of lumbar spine     Decreased strength     Acute bilateral low back pain without sciatica      Physician: Andrew Aguirre Jr.,*    Physician Orders: PT Eval and Treat: Dinorah Protocol/technique, Desensitization  Medical Diagnosis from Referral:  M51.86 (ICD-10-CM) - Other intervertebral disc disorders, lumbar region  Evaluation Date: 7/23/2020  Authorization Period Expiration: 10/7/2020    Total Visits Received: 35    Current Certification Period:  11/9/2020 to 12/4/2020  Precautions:  None  Visits from Evaluation Date:  34  Functional Level Prior to Evaluation: Unable to work, can still drive when wrist isn't hurting him    Subjective     Update:   Pt reports: his low back is feeling good and he has not had any increased pain recently.  He was compliant with home exercise program.  Response to previous treatment: low back soreness  Functional change: Ongoing    Pain: 0/10 .   Location: B mid Lower back straight across     Objective     Update:   Update:   A/PROM and MMT:  * = low back pain with testing  NT = Not tested  AROM: (degrees) LUMBAR   Flexion (40-50) 100%   Extension (15-20) 100%   Right side bending (~20) 100%   Left side bending  (~20) 100%   Right rotation (5-10) 100%   Left rotation (5-10) 100%      Hip   Right     Left   Pain/Dysfunction with Movement     AROM PROM MMT AROM PROM MMT     Flexion (L2,120 deg) WFL WFL 5/5 WFL WFL 5/5     Extension (20 deg) WFL WFL 5/5 WFL WFL 4/5     Abduction (L5, 45 deg) WFL WFL 5/5 WFL WFL 5/5     Adduction (30 deg) WFL WFL 5/5 WFL WFL 5/5     IR (30 deg) WFL WFL 5/5 WFL WFL 5/5     ER (45 deg) WFL WFL 5/5 WFL WFL 5/5        Knee   Right     Left   Pain/Dysfunction with Movement     AROM PROM MMT AROM PROM MMT     Flexion (S2, 140 deg) WFL WFL 5/5 WFL WFL 5/5      Extension (L3, 0-5 deg) WFL WFL 5/5 WFL WFL 5/5        Ankle   Right     Left   Pain/Dysfunction with Movement     AROM PROM MMT AROM PROM MMT     Dorsiflexion (L4, 20 deg) WFL WFL 5/5 WFL WFL 5/5     Plantarflexion (S1, 50 deg) WFL WFL 5/5 WFL WFL 5/5     Inversion (20-30 deg) WFL WFL 5/5 WFL WFL 5/5     Eversion (5-10 deg) WFL WFL 5/5 WFL WFL 5/5     Great toe extension (L5, 70 deg) WFL WFL 5/5 WFL WFL 5/5        Lumbar Tests:  SLR Test (L4-S3) = negative B  Ely's test: negative B  Slump tests: negative B  Quadrant testing: negative for all       Functional Job Specific Testing: Current lifting and carrying restrictions set at 30#  - 30# box lift from floor to table, 20 x      Assessment     Update:   Patient presented to physical therapy today with reports of 0/10 low back pain. Currently lifting restrictions still set at 30#. Patient continues to tolerate high level therapeutic exercises focusing on core stability and lower extremity strengthening without reports of increased pain. Patient's plan of care was updated along with objective tests and measures today. Patient has now been in physical therapy for 1 year and has shown significant improvements. Patient now displays full ROM and strength along with negative provocative testing of the lumbar spine. Patient has also met all of his goals and is ready for D/C from physical therapy upon MD approval.     Previous Short Term Goals Status:  MET  New Short Term Goals Status:  D/C  Long Term Goal Status:  MET  Reasons for Recertification of Therapy:  Updated POC    Plan     Updated Certification Period: 12/4/2020 to 1/29/2021  Recommended Treatment Plan: 2 times per week for 8 weeks: Neuromuscular Re-ed, Patient Education, Self Care and Therapeutic Exercise    Darinel Rabago, PT  12/7/2020      I CERTIFY THE NEED FOR THESE SERVICES FURNISHED UNDER THIS PLAN OF TREATMENT AND WHILE UNDER MY CARE    Physician's comments:        Physician's Signature:  ___________________________________________________

## 2021-05-21 ENCOUNTER — HOSPITAL ENCOUNTER (EMERGENCY)
Facility: HOSPITAL | Age: 31
Discharge: HOME | End: 2021-05-21
Attending: FAMILY MEDICINE

## 2021-05-21 VITALS
WEIGHT: 152 LBS | BODY MASS INDEX: 24.43 KG/M2 | SYSTOLIC BLOOD PRESSURE: 126 MMHG | TEMPERATURE: 98 F | OXYGEN SATURATION: 99 % | RESPIRATION RATE: 18 BRPM | HEART RATE: 89 BPM | HEIGHT: 66 IN | DIASTOLIC BLOOD PRESSURE: 67 MMHG

## 2021-05-21 DIAGNOSIS — Z20.822 ENCOUNTER FOR LABORATORY TESTING FOR COVID-19 VIRUS: Primary | ICD-10-CM

## 2021-05-21 PROCEDURE — 99282 EMERGENCY DEPT VISIT SF MDM: CPT | Mod: ER

## 2021-05-21 PROCEDURE — U0003 INFECTIOUS AGENT DETECTION BY NUCLEIC ACID (DNA OR RNA); SEVERE ACUTE RESPIRATORY SYNDROME CORONAVIRUS 2 (SARS-COV-2) (CORONAVIRUS DISEASE [COVID-19]), AMPLIFIED PROBE TECHNIQUE, MAKING USE OF HIGH THROUGHPUT TECHNOLOGIES AS DESCRIBED BY CMS-2020-01-R: HCPCS | Mod: ER | Performed by: NURSE PRACTITIONER

## 2021-05-21 PROCEDURE — U0005 INFEC AGEN DETEC AMPLI PROBE: HCPCS | Performed by: NURSE PRACTITIONER

## 2021-05-22 LAB — SARS-COV-2 RNA RESP QL NAA+PROBE: NOT DETECTED

## 2022-01-01 NOTE — PROGRESS NOTES
Face to Face PTA Conference performed with the following regarding patient's current status, overall progress, and plan of care:    Carly Leon PTA, Adelfo Lu PTA and Jose Allen PTA    Gloria Mayorga, PT    Jose Allen, RIANNA Lu, PTA     Carly Leon, PTA       yes

## 2022-04-25 DIAGNOSIS — S39.012D LUMBAR STRAIN, SUBSEQUENT ENCOUNTER: Primary | ICD-10-CM

## 2022-04-25 DIAGNOSIS — M51.9 LUMBAR DISC DISORDER: ICD-10-CM

## 2022-04-25 DIAGNOSIS — M54.50 LOWER BACK PAIN: ICD-10-CM

## 2022-04-25 DIAGNOSIS — M54.50 LOW BACK PAIN: ICD-10-CM

## 2022-05-20 ENCOUNTER — CLINICAL SUPPORT (OUTPATIENT)
Dept: REHABILITATION | Facility: HOSPITAL | Age: 32
End: 2022-05-20
Payer: OTHER GOVERNMENT

## 2022-05-20 DIAGNOSIS — R53.1 DECREASED STRENGTH: ICD-10-CM

## 2022-05-20 DIAGNOSIS — M53.86 DECREASED ROM OF LUMBAR SPINE: Primary | ICD-10-CM

## 2022-05-20 DIAGNOSIS — M54.50 ACUTE BILATERAL LOW BACK PAIN WITHOUT SCIATICA: ICD-10-CM

## 2022-05-20 PROCEDURE — 97110 THERAPEUTIC EXERCISES: CPT | Mod: PN

## 2022-05-20 PROCEDURE — 97161 PT EVAL LOW COMPLEX 20 MIN: CPT | Mod: PN

## 2022-05-20 NOTE — PLAN OF CARE
[facility]  Physical Therapy Initial Evaluation    [id number]    Therapy Diagnosis:   Encounter Diagnoses   Name Primary?    Decreased ROM of lumbar spine Yes    Decreased strength     Acute bilateral low back pain without sciatica      Physician: KENY Aguirre III*    Physician Orders: PT Eval and Treat   Medical Diagnosis from Referral:   S39.012D (ICD-10-CM) - Lumbar strain, subsequent encounter   M51.9 (ICD-10-CM) - Lumbar disc disorder   M54.50 (ICD-10-CM) - Lower back pain     Evaluation Date: 5/20/2022  Authorization Period Expiration: 4/25/23  Plan of Care Expiration: 7/15/22  Progress Note Due: 7/1/22  Visit # / Visits authorized: 1/ 24  FOTO: 0/5 (Not captured)    Time In: 8:05( arrived late)  Time Out: 8:40  Total Appointment Time (timed & untimed codes): 35 minutes    Precautions: Standard    Subjective   Date of injury: 11/4/19  History of current condition - Gurpreet reports:  hx of injury at work on 11/4/19 after fall out of chair with PT for one year following with full return to work. He had 2 disc bulges in lumbar spine. He works for [employer] and reports constant back pain with work duties. He has been taking muscle relaxers and had steroid injection in 2020. Muscle relaxer make him sleepy so he only takes in afternoons. Over the last year, he gets flare ups every now and then which resolve. Current flare up in march not recovered. Sometimes sharp, warm, burning, pressure. Pain with standing 15 mins, sitting 15 mins, popping with certain rotations. relief with stretches and upright posture.  He's been out of work since 3/28/22. He has been doing basic range of motion stretches at home only with partial relief. Pt denies numbness and tingling into LEs. Pain starts on left and radiates across back. No sleep impairment.    Occupation (including job description if provided): [employer]   Job Demands: standing all shift, Bending, lifting,  "stooping. Sometimes lifts heavy suitcases to/from carousel .   Prior Medical Treatment: Therapy  , injection 2020  Current Work Restrictions: not currently working since 3/28/22    Medical History:   Past Medical History:   Diagnosis Date    Anxiety     Concussion     Fatigue     Headache(784.0)     HIV (human immunodeficiency virus infection)     Immune deficiency disorder     PTSD (post-traumatic stress disorder)        Surgical History:   Gurpreet Youngblood  has no past surgical history on file.    Medications:   Gurpreet has a current medication list which includes the following prescription(s): ibuprofen and nabumetone.    Allergies:   Review of patient's allergies indicates:  No Known Allergies     Imaging, MRI studies: 12/17/19: "FINDINGS:  Spinal alignment is within normal limits.  There is no loss of vertebral body height or intervertebral disc space.  Bone marrow signal is normal.  The conus terminates at the L1-2 level in visualized cord demonstrates normal, homogeneous signal.  Degenerative changes are detailed by level as follows:     L2-3: There is mild facet arthropathy without canal or neural foraminal narrowing     L3-4: There is mild facet arthropathy without canal or neural foraminal narrowing.  A tiny synovial cyst projects posteriorly from the left facet.     L4-5: There is facet arthropathy and ligamentum flavum thickening without canal or foraminal narrowing     L5-S1: There is a mild posterior broad-based disc bulge asymmetric to the right and facet arthropathy resulting in mild right neural foraminal narrowing without spinal canal stenosis.     Impression:     Mild degenerative changes of the lumbar spine most pronounced at L5-S1 where facet arthropathy and mild posterior broad-based disc bulge result in mild right neural foraminal narrowing."    Social History: lives with mother, single story home, independent with ADLs     Pain:  Current 6/10, worst 9/10, best 2/10   Location: back - " lumbar  Description: Burning, Tight, Sharp and Hot  Aggravating Factors: Sitting, Standing, Bending, Lifting and Getting out of bed/chair  Alleviating Factors: pain medication and injection    Pt's goals: Return to gainful employment, decreasing pain              Objective     Functional Job Specific Testing:     Job Specific Task Job Demands Current Ability Deficit? (Yes or No)   1. Lifting suitcase  50lbs  10lb with pain   yes   2. Standing  7 hrs  15 minutes  yes   3. Squatting  50x a day  15x with pain  yes     Posture: Forward head and rounded shoulders, slight posterior pelvic tilt   Palpation: TTP at left lumbar paraspinals, left transverse processes L3-5  Sensation: normal light touch     Range of Motion/Strength:     Lumbar AROM Pain/Dysfunction with Movement:   Flexion 49* Midline and left    Extension 20    Right side bending 15*  left   Left side bending 25    Right rotation 50%* Left    Left rotation 75%    *denotes pain with movement    Hip Right Left Pain/Dysfunction with Movement   AROM/PROM         flexion WFL WFL     extension WFL WFL     abduction WFL WFL     adduction WFL WFL     Internal rotation Moderately limited Moderately limited     External rotation WFL Moderately limited         L/E strength  Microfet(kg)/ MMT (*/5)  Right Left Pain/Dysfunction with Movement   Hip Flexion 15.5 kg 10.2 kg    Hip Abduction 11.3 kg 9.5 kg    Knee Flexion 12.9 kg 6.0 kg    Knee Extension 21.1 kg 16.0 kg    Ankle DF 5/5 5/5    Ankle PF 5/5 5/5    Hip extension 4+/5 4+/5    Hip internal rotation  5/5 5/5    Hip external rotation  5/5 4+/5        Joint Mobility:   - Lumbar: decreased joint mobility throughout lumbar spine. Pain with P/A greatest L3-5  -Thoracic: decreased mobility with p/a throughout thoracic spine    Flexibility: tight left hamstring, right within normal limits     Special Tests: - slump     Gait Analysis:Without AD Assistance independent: Deviations: decreased speed and step length     30  "second sit-to-stand test (without U/E support): 15   30" sit to stand Cutoff Scores:15+     Demo Squat to  suitcase (no weight) : able to quickly squat wide LOCO, Feet external rotation, knees over toes.  Also demos golfers lift with left lower extremity with right trunk rotation, mild LOB     Limitation/Restriction for FOTO lumbar Survey    Therapist reviewed FOTO scores for Gurpreet Youngblood on 5/20/2022.   FOTO documents entered into [employer] - see Media section.    Limitation Score: Not captured%       TREATMENT   Treatment Time In: 8:30  Treatment Time Out: 8:40  Total Treatment time (time-based codes) separate from Evaluation: 10 minutes    Gurpreet received therapeutic exercises to develop strength, endurance, ROM, flexibility, posture and core stabilization for 10 minutes including:  Prone press ups 10x  Lumbar extensions 10x  Open books 5x bilateral   Bridge 10x   lower trunk rotation 5x bilateral     Home Exercises and Patient Education Provided    Education provided:    Anatomy and Pathology.   Symptom management and plan of care progressions.   Home Exercise Program.      Written Home Exercises Provided: yes.  Exercises were reviewed and Gurpreet was able to demonstrate them prior to the end of the session.  Gurpreet demonstrated good  understanding of the education provided.     See EMR under Patient Instructions for exercises provided 5/20/2022.    Assessment   Gurpreet is a 31 y.o. male referred to outpatient Physical Therapy with a medical diagnosis of lumbar strain, lumbar disc disorder, lower back pain. Pt presents with acute on chronic low back pain without sciatica with initial injury 11/4/19 with exacerbation around 3/28/22 and out of work since exacerbation. Pt presents with decreased lumbar range of motion, decreased lower extremity strength left>Right, decreased lumbar joint mobility, poor posture,  soft tissue restrictions of lumbar spine, impaired balance and gait, and limited functional mobility. 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     The patient's current job specific task deficits include the following: standing majority of shift, squatting, lifting ~50lbs, bending, stooping, sitting.     Pt prognosis is Good.     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    Plan of care discussed with patient: Yes  Pt's spiritual, cultural and educational needs considered and patient is agreeable to the plan of care and goals as stated below:     Anticipated Barriers for therapy: chronicity of current injury, objective abilities vs. self-perceived abilities and self-limiting behaviors due to pain    Medical Necessity is demonstrated by the following  History  Co-morbidities and personal factors that may impact the plan of care Co-morbidities:    Anxiety      Concussion      Fatigue      Headache(784.0)      HIV (human immunodeficiency virus infection)      Immune deficiency disorder      PTSD (post-traumatic stress disorder)      Personal Factors:   coping style     moderate   Examination  Body Structures and Functions, activity limitations and participation restrictions that may impact the plan of care Body Regions:   back  lower extremities    Body Systems:    gross symmetry  ROM  strength  balance  gait  transfers  transitions  motor control    Participation Restrictions:   Job duties, community ambulation    Activity limitations:   Learning and applying knowledge  no deficits    General Tasks and Commands  no deficits    Communication  no deficits    Mobility  lifting and carrying objects  walking  driving (bike, car, motorcycle)    Self care  no deficits    Domestic Life  doing house work (cleaning house, washing dishes, laundry)    Interactions/Relationships  no deficits    Life  Areas  employment    Community and Social Life  community life  recreation and leisure         moderate   Clinical Presentation stable and uncomplicated low   Decision Making/ Complexity Score: low     Goals:     Short Term Goals (3 Weeks):  1. Patient will be compliant with home exercise program to supplement therapy in promoting functional mobility.  2. Patient will perform 12lb deadlift with good control to demonstrate improved core/LE strength.  3. Patient will report no pain during thoracolumbar active range of motion to promote functional mobility.  4. Patient will improve impaired lower extremity hamstring and hip strength by 3kg+ to improve strength for functional tasks.    Long Term Goals (8 Weeks):   1. Patient will improve FOTO score to </= TBD% limited to decrease perceived limitation with maintaining/changing body position.   2. Patient will perform floor to waist lift with 45# Kettle bell with good control to demonstrate improved core and lower extremity  Strength to perform job duties   3. Patient will be able to perform 45 mins of standing exercises to improve tolerance for standing.   4. Patient will report no pain during 50x squatting promote tolerance for job duties.  Pt will return to work full duty, full time.    Plan   Plan of care Certification: 5/20/2022 to 7/1/22.    Outpatient Physical Therapy 2 times weekly for 6 weeks to include the following interventions: Cervical/Lumbar Traction, Electrical Stimulation  , Gait Training, Manual Therapy, Moist Heat/ Ice, Neuromuscular Re-ed, Patient Education, Therapeutic Activities and Therapeutic Exercise.     Upon discharge from further skilled PT intervention it will determined if the need for a work conditioning program or Functional Capacity Evaluation is required to allow the injured worker to return to work with full potential achieved, continued improvement with body mechanics with advanced functional activities, and further prevention of future  work-related injuries.     DRU KIRBY, PT  5/20/2022

## 2022-05-23 ENCOUNTER — TELEPHONE (OUTPATIENT)
Dept: REHABILITATION | Facility: HOSPITAL | Age: 32
End: 2022-05-23
Payer: OTHER GOVERNMENT

## 2022-05-23 NOTE — TELEPHONE ENCOUNTER
Pt called regarding cancelled visit 11:15AM on 5/23/22 via [facility]. Pt states he had a orthodontist appointment scheduled at the same time as PT appointment. Rescheduled visit for 5/24/22 at 3:00 PM.    DRU KIRBY, PT

## 2022-05-25 ENCOUNTER — CLINICAL SUPPORT (OUTPATIENT)
Dept: REHABILITATION | Facility: HOSPITAL | Age: 32
End: 2022-05-25
Payer: OTHER GOVERNMENT

## 2022-05-25 DIAGNOSIS — M54.50 ACUTE BILATERAL LOW BACK PAIN WITHOUT SCIATICA: ICD-10-CM

## 2022-05-25 DIAGNOSIS — R53.1 DECREASED STRENGTH: ICD-10-CM

## 2022-05-25 DIAGNOSIS — M53.86 DECREASED ROM OF LUMBAR SPINE: Primary | ICD-10-CM

## 2022-05-25 PROCEDURE — 97110 THERAPEUTIC EXERCISES: CPT | Mod: PN,CQ

## 2022-05-25 PROCEDURE — 97530 THERAPEUTIC ACTIVITIES: CPT | Mod: PN,CQ

## 2022-05-25 NOTE — PROGRESS NOTES
Physical Therapy Daily Treatment Note     [id number]    Therapy Diagnosis:   Encounter Diagnoses   Name Primary?    Decreased ROM of lumbar spine Yes    Decreased strength     Acute bilateral low back pain without sciatica      Physician: KENY Aguirre III*    Visit Date: 5/25/2022    Evaluation Date: 5/20/2022  Authorization Period Expiration: 4/25/23  Plan of Care Expiration: 7/15/22  Progress Note Due: 7/1/22  Visit # / Visits authorized: 2/ 24  FOTO:1/5 (Captured on visit #2)      Time In: 10:45 am  Time Out: 11:35 am  Total Billable Time: 50 minutes TEx3, There. Activity x 1  Precautions: Standard    Subjective     Pt reports: min/mod complaints of left sided low back pain  He was compliant with home exercise program.  Response to previous treatment: No adverse effects  Functional change: ongoing    Pain: 3/10  Location: left low back       Objective     Gurpreet received therapeutic exercises to develop strength, endurance, ROM, flexibility, posture and core stabilization for 40 minutes including:  Prone press ups 10x  Supine transverse abdominus activation: 2x10   Open books 2x10 bilateral   Bridge with glut focus 2x10  lower trunk rotation 2x10 bilateral   Seated transverse abdominus activation:  2x10  Lumbar extensions 10x  Hip hinges: 1x10  Hip hinge with squat:  1x10    Therapeutic activities for instruction of and practice of postural awareness and back protection with daily activity and lifting techniques x 10 minutes including:    Log roll technique for bed mobility and supine<>sit  Postural awareness and correction with transverse abdominus activation with performance of daily activities.  Hip hinge and step out not twisting with lifting  Home Exercises Provided and Patient Education Provided     Education provided:   - as noted with Therapeutic Activity  - Importance of daily home exercise program performance    Written Home Exercises Provided: Patient  "instructed to cont prior HEP.  Exercises were reviewed and Gurpreet was able to demonstrate them prior to the end of the session.  Gurpreet demonstrated good  understanding of the education provided.     See EMR under Patient Instructions for exercises provided 5/20/2022.      Assessment     Patient required review and verbal cueing to perform therex with correct technique and to maintain normal breathing with core stabilization activity. Some difficulty initially with transverse abdominus activation in supine improved with cues and in seated. Good tolerance of program with reports of decreased pain after treatment.  Rates "1.5/10"  Gurpreet Is progressing well towards his goals.   Pt prognosis is Good.     Pt will continue to benefit from skilled outpatient physical therapy to address the deficits listed in the problem list box on initial evaluation, provide pt/family education and to maximize pt's level of independence in the home and community environment.     Pt's spiritual, cultural and educational needs considered and pt agreeable to plan of care and goals.    Anticipated barriers to physical therapy: chronicity of current injury, objective abilities vs. self-perceived abilities and self-limiting behaviors due to pain    Goals:      Short Term Goals (3 Weeks):  1. Patient will be compliant with home exercise program to supplement therapy in promoting functional mobility.  2. Patient will perform 12lb deadlift with good control to demonstrate improved core/LE strength.  3. Patient will report no pain during thoracolumbar active range of motion to promote functional mobility.  4. Patient will improve impaired lower extremity hamstring and hip strength by 3kg+ to improve strength for functional tasks.     Long Term Goals (8 Weeks):   1. Patient will improve FOTO score to </= TBD% limited to decrease perceived limitation with maintaining/changing body position.   2. Patient will perform floor to waist lift with 45# Kettle " bell with good control to demonstrate improved core and lower extremity  Strength to perform job duties   3. Patient will be able to perform 45 mins of standing exercises to improve tolerance for standing.   4. Patient will report no pain during 50x squatting promote tolerance for job duties.  Pt will return to work full duty, full time.      Plan     Continue PT per plan of care - progress with core stabilization and strengthening as able.    Carly Leon, PTA

## 2022-05-27 ENCOUNTER — TELEPHONE (OUTPATIENT)
Dept: REHABILITATION | Facility: HOSPITAL | Age: 32
End: 2022-05-27
Payer: OTHER GOVERNMENT

## 2022-05-27 NOTE — TELEPHONE ENCOUNTER
Pt called regarding missed visit 5/27/22 at 8:30AM. Pt also no-showed rescheduled visit from 5/23/22 on 5/24/22. This is his second missed visit. Unable to leave voicemail due to mailbox full.     DRU KIRBY, PT

## 2022-05-30 ENCOUNTER — TELEPHONE (OUTPATIENT)
Dept: REHABILITATION | Facility: HOSPITAL | Age: 32
End: 2022-05-30
Payer: OTHER GOVERNMENT

## 2022-05-30 NOTE — TELEPHONE ENCOUNTER
Called regarding today's No Show, left a message on his voicemail reminding him that his next appointment is on 6/1/22 @ 10:30.

## 2022-06-01 ENCOUNTER — CLINICAL SUPPORT (OUTPATIENT)
Dept: REHABILITATION | Facility: HOSPITAL | Age: 32
End: 2022-06-01
Payer: OTHER GOVERNMENT

## 2022-06-01 DIAGNOSIS — R53.1 DECREASED STRENGTH: ICD-10-CM

## 2022-06-01 DIAGNOSIS — M53.86 DECREASED ROM OF LUMBAR SPINE: Primary | ICD-10-CM

## 2022-06-01 DIAGNOSIS — M54.50 ACUTE BILATERAL LOW BACK PAIN WITHOUT SCIATICA: ICD-10-CM

## 2022-06-01 PROCEDURE — 97110 THERAPEUTIC EXERCISES: CPT | Mod: PN

## 2022-06-01 NOTE — PROGRESS NOTES
Physical Therapy Daily Treatment Note     [facility]  [facility] Number[id number]    Therapy Diagnosis:   Encounter Diagnoses   Name Primary?    Decreased ROM of lumbar spine Yes    Acute bilateral low back pain without sciatica     Decreased strength      Physician: KENY Aguirre III*    Visit Date: 6/1/2022    Evaluation Date: 5/20/2022  Authorization Period Expiration: 4/25/23  Plan of Care Expiration: 7/15/22  Progress Note Due: 7/1/22  Visit # / Visits authorized: 3/ 24  FOTO:2/5 (Captured on visit #2)      Time In: 10:30 am  Time Out: 11:10 am  Total Billable Time: 40 minutes TEx3     Precautions: Standard    Subjective     Pt reports: he has follow up with orthopedic today. May return to light duty for work Sunday. Pt states he has bad service so he didn't get phone calls about missed appointments.  He was compliant with home exercise program.  Response to previous treatment: No adverse effects  Functional change: ongoing    Pain: 3/10  Location: left low back       Objective     Gurpreet received therapeutic exercises to develop strength, endurance, ROM, flexibility, posture and core stabilization for 40 minutes including:  Prone press ups 2x10  Supine transverse abdominus activation: 2x10 NT  Open books 2x10 bilateral YTB  Bridge blue theraband with glut focus 3x10  lower trunk rotation 2x10 bilateral   Seated transverse abdominus activation:  2x10 NT  Lumbar extensions 10x  Seated Hip hinges: 1x10  Seated thoracic extensions 20x  Hip hinge with squat: 2x8 8#  Carry 2 laps ea 8#  Shoulder extensions  2x10  pallof press green theraband 15x  deadbug 2x10      Therapeutic activities for instruction of and practice of postural awareness and back protection with daily activity and lifting techniques x 00 minutes including:  Log roll technique for bed mobility and supine<>sit  Postural awareness and correction with transverse abdominus activation with performance of daily activities.  Hip hinge  and step out not twisting with lifting    Home Exercises Provided and Patient Education Provided     Education provided:   - as noted with Therapeutic Activity  - Importance of daily home exercise program performance    Written Home Exercises Provided: Patient instructed to cont prior HEP.  Exercises were reviewed and Gurpreet was able to demonstrate them prior to the end of the session.  Gurpreet demonstrated good  understanding of the education provided.     See EMR under Patient Instructions for exercises provided 5/20/2022.      Assessment     Patient presents to PT with 3/10 pain. Pt demos decreased lumbar and thoracic mobility with prone press ups. Continued to progress with core stability and functional strengthening. Pt required verbal cuing with performance of there-ex. Mild pain with hip hinging reported. Pt instructed to update contact information in chart to improve communication regarding appointments in the future. No increased pain post treatment. Pt to return to [place] tomorrow for follow-up and may be cleared to return to light duty.     Gurpreet Is progressing well towards his goals.   Pt prognosis is Good.     Pt will continue to benefit from skilled outpatient physical therapy to address the deficits listed in the problem list box on initial evaluation, provide pt/family education and to maximize pt's level of independence in the home and community environment.     Pt's spiritual, cultural and educational needs considered and pt agreeable to plan of care and goals.    Anticipated barriers to physical therapy: chronicity of current injury, objective abilities vs. self-perceived abilities and self-limiting behaviors due to pain    Goals:      Short Term Goals (3 Weeks):  1. Patient will be compliant with home exercise program to supplement therapy in promoting functional mobility.  2. Patient will perform 12lb deadlift with good control to demonstrate improved core/LE strength.  3. Patient will report no pain  during thoracolumbar active range of motion to promote functional mobility.  4. Patient will improve impaired lower extremity hamstring and hip strength by 3kg+ to improve strength for functional tasks.     Long Term Goals (8 Weeks):   1. Patient will improve FOTO score to </= TBD% limited to decrease perceived limitation with maintaining/changing body position.   2. Patient will perform floor to waist lift with 45# Kettle bell with good control to demonstrate improved core and lower extremity  Strength to perform job duties   3. Patient will be able to perform 45 mins of standing exercises to improve tolerance for standing.   4. Patient will report no pain during 50x squatting promote tolerance for job duties.  Pt will return to work full duty, full time.      Plan     Continue PT per plan of care - progress with core stabilization and strengthening as able.    DRU KIRBY, PT

## 2022-06-03 ENCOUNTER — CLINICAL SUPPORT (OUTPATIENT)
Dept: REHABILITATION | Facility: HOSPITAL | Age: 32
End: 2022-06-03
Payer: OTHER GOVERNMENT

## 2022-06-03 DIAGNOSIS — R53.1 DECREASED STRENGTH: ICD-10-CM

## 2022-06-03 DIAGNOSIS — M54.50 ACUTE BILATERAL LOW BACK PAIN WITHOUT SCIATICA: ICD-10-CM

## 2022-06-03 DIAGNOSIS — M53.86 DECREASED ROM OF LUMBAR SPINE: Primary | ICD-10-CM

## 2022-06-03 PROCEDURE — 97110 THERAPEUTIC EXERCISES: CPT | Mod: PN

## 2022-06-03 NOTE — PROGRESS NOTES
Physical Therapy Daily Treatment Note     [facility]  [facility] Number[facility] [id number]    Therapy Diagnosis:   Encounter Diagnoses   Name Primary?    Decreased ROM of lumbar spine Yes    Acute bilateral low back pain without sciatica     Decreased strength      Physician: KENY Aguirre III*    Visit Date: 6/3/2022    Evaluation Date: 5/20/2022  Authorization Period Expiration: 4/25/23  Plan of Care Expiration: 7/15/22  Progress Note Due: 7/1/22  Visit # / Visits authorized: 4/ 24  FOTO:3/5 (Captured on visit #2)      Time In: 8:50 am (arrived 20 min late)  Time Out: 9:16am  Total Billable Time: 26 minutes TEx2     Precautions: Standard    Subjective     Pt reports: pt woke up late this morning. Took muscle relaxer last night at 10pm. RTW 4hr/day  Light duty 6/5/22 sitting, standing, walking 10lb lifting restriction intermittent. No bending, stooping, twisting. Change in position every 30 min.   He was compliant with home exercise program.  Response to previous treatment: No adverse effects  Functional change: ongoing    Pain: 1/10  Location: left low back       Objective     Gurpreet received therapeutic exercises to develop strength, endurance, ROM, flexibility, posture and core stabilization for 26 minutes including:  Prone press ups 2x10  Supine transverse abdominus activation: 2x10 NT  Open books 2x10 bilateral YTB  Bridge blue theraband with glut focus 3x10  lower trunk rotation 2x10 bilateral NT  Bird dog- LEs only and UEs only 10x ea  Seated transverse abdominus activation:  2x10 NT  Lumbar extensions 10x NT  Seated Hip hinges: 1x10 NT  Seated thoracic extensions 20x  Squats 8# 2x10   Hip hinge with squat: 2x10 8#  Carry 2 laps ea 8# NT  Overhead carry 3 laps 5# therabar   Shoulder extensions  2x10 NT  pallof press green theraband 20x  deadbug 2x10 NT      Therapeutic activities for instruction of and practice of postural awareness and back protection with daily activity and lifting techniques x  00 minutes including:  Log roll technique for bed mobility and supine<>sit  Postural awareness and correction with transverse abdominus activation with performance of daily activities.  Hip hinge and step out not twisting with lifting    Home Exercises Provided and Patient Education Provided     Education provided:   - as noted with Therapeutic Activity  - Importance of daily home exercise program performance    Written Home Exercises Provided: Patient instructed to cont prior HEP.  Exercises were reviewed and Gurpreet was able to demonstrate them prior to the end of the session.  Gurpreet demonstrated good  understanding of the education provided.     See EMR under Patient Instructions for exercises provided 5/20/2022.      Assessment     Patient presents to PT with steady decrease in pain reporting 1/10 pain today. Pt arrived 20 mins late, therefore abbreviated session performed due to time. Pt tolerated session well with good tolerance for today's progressions. Pt reported some n/t into hands with overhead carry and thoracic extension exercises. Modified hand position to avoid increase in sx. Pt cleared to return to light duty beginning Sunday 6/5/22. Monitor response with RTW.     Gurpreet Is progressing well towards his goals.   Pt prognosis is Good.     Pt will continue to benefit from skilled outpatient physical therapy to address the deficits listed in the problem list box on initial evaluation, provide pt/family education and to maximize pt's level of independence in the home and community environment.     Pt's spiritual, cultural and educational needs considered and pt agreeable to plan of care and goals.    Anticipated barriers to physical therapy: chronicity of current injury, objective abilities vs. self-perceived abilities and self-limiting behaviors due to pain    Goals:      Short Term Goals (3 Weeks):  1. Patient will be compliant with home exercise program to supplement therapy in promoting functional  mobility.  2. Patient will perform 12lb deadlift with good control to demonstrate improved core/LE strength.  3. Patient will report no pain during thoracolumbar active range of motion to promote functional mobility.  4. Patient will improve impaired lower extremity hamstring and hip strength by 3kg+ to improve strength for functional tasks.     Long Term Goals (8 Weeks):   1. Patient will improve FOTO score to </= TBD% limited to decrease perceived limitation with maintaining/changing body position.   2. Patient will perform floor to waist lift with 45# Kettle bell with good control to demonstrate improved core and lower extremity  Strength to perform job duties   3. Patient will be able to perform 45 mins of standing exercises to improve tolerance for standing.   4. Patient will report no pain during 50x squatting promote tolerance for job duties.  Pt will return to work full duty, full time.      Plan     Continue PT per plan of care - progress with core stabilization and strengthening as able.    DRU KIRBY, PT

## 2022-06-06 ENCOUNTER — CLINICAL SUPPORT (OUTPATIENT)
Dept: REHABILITATION | Facility: HOSPITAL | Age: 32
End: 2022-06-06
Payer: OTHER GOVERNMENT

## 2022-06-06 DIAGNOSIS — M53.86 DECREASED ROM OF LUMBAR SPINE: Primary | ICD-10-CM

## 2022-06-06 DIAGNOSIS — R53.1 DECREASED STRENGTH: ICD-10-CM

## 2022-06-06 DIAGNOSIS — M54.50 ACUTE BILATERAL LOW BACK PAIN WITHOUT SCIATICA: ICD-10-CM

## 2022-06-06 PROCEDURE — 97110 THERAPEUTIC EXERCISES: CPT | Mod: PN

## 2022-06-06 NOTE — PROGRESS NOTES
Physical Therapy Daily Treatment Note     [facility]  [facility] Number[facility] [id number]    Therapy Diagnosis:   Encounter Diagnoses   Name Primary?    Decreased ROM of lumbar spine Yes    Acute bilateral low back pain without sciatica     Decreased strength      Physician: KENY Aguirre III*    Visit Date: 6/6/2022    Evaluation Date: 5/20/2022  Authorization Period Expiration: 4/25/23  Plan of Care Expiration: 7/15/22  Progress Note Due: 7/1/22  Visit # / Visits authorized: 5/ 24  FOTO:4/5 (Captured on visit #2)      Time In: 12:51 pm  Time Out: 1:30 pm  Total Billable Time: 39 minutes TEx3     Precautions: Standard    Subjective     Pt reports: restrictions not accepted by employer but waiting for DOL to accept injury. This previous took 5 months the last time he was treated for this injury. Pt denies pain at the moment.     He was compliant with home exercise program.  Response to previous treatment: No adverse effects  Functional change: ongoing    Pain: 0/10  Location: left low back       Objective     Gurpreet received therapeutic exercises to develop strength, endurance, ROM, flexibility, posture and core stabilization for 39 minutes including:  Prone press ups 2x10  Open books 1x15 bilateral green theraband   Bridge blue theraband 2x10  Bridge march 10x bilateral blue theraband   Bird dog- LEs and UEs 10x ea  Lumbar extensions 10x NT  Seated thoracic extensions 20x NT  Squats 8# 2x10 NT   Hip hinge with squat:3x10 12#  Hip hinge 15x 7# dumbells   Carry 2 laps ea 20#   Overhead carry 3 laps 5# therabar NT  Unilateral overhead carry 8# 2x50ft ea  Shoulder extensions with lunge  2x15   pallof press green theraband 20x  deadbug 2x10 NT  Leg press 3x10 7 plates      Therapeutic activities for instruction of and practice of postural awareness and back protection with daily activity and lifting techniques x 00 minutes including:  Log roll technique for bed mobility and supine<>sit  Postural awareness  and correction with transverse abdominus activation with performance of daily activities.  Hip hinge and step out not twisting with lifting    Home Exercises Provided and Patient Education Provided     Education provided:   - as noted with Therapeutic Activity  - Importance of daily home exercise program performance    Written Home Exercises Provided: Patient instructed to cont prior HEP.  Exercises were reviewed and Gurpreet was able to demonstrate them prior to the end of the session.  Gurpreet demonstrated good  understanding of the education provided.     See EMR under Patient Instructions for exercises provided 5/20/2022.      Assessment     Patient presents to PT without complaints of pain. Pt states employer denied light duty status, waiting on DOL to approve case. Pt tolerated session well. Able to progress multiple dynamic core strengthening exercises and progress functional strengthening. Min cuing required for appropriate form. Pt reports some fatigue post treatment but denies pain.     Gurpreet Is progressing well towards his goals.   Pt prognosis is Good.     Pt will continue to benefit from skilled outpatient physical therapy to address the deficits listed in the problem list box on initial evaluation, provide pt/family education and to maximize pt's level of independence in the home and community environment.     Pt's spiritual, cultural and educational needs considered and pt agreeable to plan of care and goals.    Anticipated barriers to physical therapy: chronicity of current injury, objective abilities vs. self-perceived abilities and self-limiting behaviors due to pain    Goals:      Short Term Goals (3 Weeks):  1. Patient will be compliant with home exercise program to supplement therapy in promoting functional mobility.  2. Patient will perform 12lb deadlift with good control to demonstrate improved core/LE strength.  3. Patient will report no pain during thoracolumbar active range of motion to promote  functional mobility.  4. Patient will improve impaired lower extremity hamstring and hip strength by 3kg+ to improve strength for functional tasks.     Long Term Goals (8 Weeks):   1. Patient will improve FOTO score to </= 48% limited to decrease perceived limitation with maintaining/changing body position.   2. Patient will perform floor to waist lift with 45# Kettle bell with good control to demonstrate improved core and lower extremity  Strength to perform job duties   3. Patient will be able to perform 45 mins of standing exercises to improve tolerance for standing.   4. Patient will report no pain during 50x squatting promote tolerance for job duties.  5. Pt will return to work full duty, full time.      Plan     Continue PT per plan of care - progress with core stabilization and strengthening as able.    DRU KIRBY, PT

## 2022-06-08 ENCOUNTER — CLINICAL SUPPORT (OUTPATIENT)
Dept: REHABILITATION | Facility: HOSPITAL | Age: 32
End: 2022-06-08
Payer: OTHER GOVERNMENT

## 2022-06-08 DIAGNOSIS — M54.50 ACUTE BILATERAL LOW BACK PAIN WITHOUT SCIATICA: ICD-10-CM

## 2022-06-08 DIAGNOSIS — M53.86 DECREASED ROM OF LUMBAR SPINE: Primary | ICD-10-CM

## 2022-06-08 DIAGNOSIS — R53.1 DECREASED STRENGTH: ICD-10-CM

## 2022-06-08 PROCEDURE — 97110 THERAPEUTIC EXERCISES: CPT | Mod: PN,CQ

## 2022-06-08 NOTE — PROGRESS NOTES
Physical Therapy Daily Treatment Note     [facility]  [facility] [id number]    Therapy Diagnosis:   Encounter Diagnoses   Name Primary?    Decreased ROM of lumbar spine Yes    Decreased strength     Acute bilateral low back pain without sciatica      Physician: KENY Aguirre III*    Visit Date: 6/8/2022    Evaluation Date: 5/20/2022  Authorization Period Expiration: 4/25/23  Plan of Care Expiration: 7/15/22  Progress Note Due: 7/1/22  Visit # / Visits authorized: 6/ 24  FOTO: 6/7 (Captured on visit #2)      Time In: 8:38 am  Time Out: 9:16 am  Total Billable Time: 38 minutes TEx3     Precautions: Standard    Subjective     Pt reports:he has minimal pain today.  States he cut his grass yesterday.    He was compliant with home exercise program.  Response to previous treatment: No adverse effects  Functional change: ongoing    Pain: 2/10  Location: left low back       Objective     Gurpreet received therapeutic exercises to develop strength, endurance, ROM, flexibility, posture and core stabilization for 36 minutes including:  Prone press ups 2x10  Open books 1x15 bilateral green theraband   Bridge blue theraband 2x15  Bridge march 10x bilateral blue theraband   Bird dog- LEs and UEs 10x2 ea  Lumbar extensions 10x NT  Seated thoracic extensions 20x NT  Squats 8# 2x10 NT   Hip hinge with squat:2x15 12#  Hip hinge 2x107# dumbells   Carry 2 laps ea 20#   Overhead carry 3 laps 5# therabar NT  Unilateral overhead carry 8# 2x50ft ea  Shoulder extensions with lunge  2x15 green theraband   Shoulder extensions with march 2x10 green theraband   pallof press green theraband 20x  deadbug 2x10 N T  Leg press 2x15 7 plates      Therapeutic activities for instruction of and practice of postural awareness and back protection with daily activity and lifting techniques x 02 minutes including:    Body mechanics for use of weedwacker and .  Log roll technique for bed mobility and supine<>sit  Postural  "awareness and correction with transverse abdominus activation with performance of daily activities.  Hip hinge and step out not twisting with lifting    Home Exercises Provided and Patient Education Provided     Education provided:   - as noted with Therapeutic Activity  - Importance of daily home exercise program performance    Written Home Exercises Provided: Patient instructed to cont prior HEP.  Exercises were reviewed and Gurpreet was able to demonstrate them prior to the end of the session.  Gurpreet demonstrated good  understanding of the education provided.     See EMR under Patient Instructions for exercises provided 5/20/2022.      Assessment     Patient presents to PT with minimal complaints of pain after cutting grass yesterday. Instructed in body mechanics for weedwacker and .  Verbalized understanding.  In creased reps as bolded. Pt tolerated session well. Able to progress multiple dynamic core strengthening exercises and progress functional strengthening. Min cuing continues to be required for appropriate form. Pt reports some fatigue post treatment but denies pain. "I'm okay"  Not specific to scale.    Gurpreet Is progressing well towards his goals.   Pt prognosis is Good.     Pt will continue to benefit from skilled outpatient physical therapy to address the deficits listed in the problem list box on initial evaluation, provide pt/family education and to maximize pt's level of independence in the home and community environment.     Pt's spiritual, cultural and educational needs considered and pt agreeable to plan of care and goals.    Anticipated barriers to physical therapy: chronicity of current injury, objective abilities vs. self-perceived abilities and self-limiting behaviors due to pain    Goals:      Short Term Goals (3 Weeks):  1. Patient will be compliant with home exercise program to supplement therapy in promoting functional mobility.  2. Patient will perform 12lb deadlift with good control " to demonstrate improved core/LE strength.  3. Patient will report no pain during thoracolumbar active range of motion to promote functional mobility.  4. Patient will improve impaired lower extremity hamstring and hip strength by 3kg+ to improve strength for functional tasks.     Long Term Goals (8 Weeks):   1. Patient will improve FOTO score to </= 48% limited to decrease perceived limitation with maintaining/changing body position.   2. Patient will perform floor to waist lift with 45# Kettle bell with good control to demonstrate improved core and lower extremity  Strength to perform job duties   3. Patient will be able to perform 45 mins of standing exercises to improve tolerance for standing.   4. Patient will report no pain during 50x squatting promote tolerance for job duties.  5. Pt will return to work full duty, full time.      Plan     Continue PT per plan of care - progress with core stabilization and strengthening as able.    Carly Leon, PTA

## 2022-06-13 ENCOUNTER — TELEPHONE (OUTPATIENT)
Dept: REHABILITATION | Facility: HOSPITAL | Age: 32
End: 2022-06-13
Payer: OTHER GOVERNMENT

## 2022-06-13 NOTE — TELEPHONE ENCOUNTER
"Pt called due to cancelled visit via [employer] reason "other." This is 4th no-show/cancellation. Pt informed of next appointment on 6/15/22. Offered available appointment times on 6/14 or 6/16.    DRU KIRBY, PT      "

## 2022-06-14 ENCOUNTER — CLINICAL SUPPORT (OUTPATIENT)
Dept: REHABILITATION | Facility: HOSPITAL | Age: 32
End: 2022-06-14
Payer: OTHER GOVERNMENT

## 2022-06-14 DIAGNOSIS — M54.50 ACUTE BILATERAL LOW BACK PAIN WITHOUT SCIATICA: ICD-10-CM

## 2022-06-14 DIAGNOSIS — R53.1 DECREASED STRENGTH: ICD-10-CM

## 2022-06-14 DIAGNOSIS — M53.86 DECREASED ROM OF LUMBAR SPINE: Primary | ICD-10-CM

## 2022-06-14 PROCEDURE — 97110 THERAPEUTIC EXERCISES: CPT | Mod: PN,CQ

## 2022-06-14 NOTE — PROGRESS NOTES
"  Physical Therapy Daily Treatment Note     [facility]  [facility] Number: [id number]    Therapy Diagnosis:   Encounter Diagnoses   Name Primary?    Decreased ROM of lumbar spine Yes    Decreased strength     Acute bilateral low back pain without sciatica      Physician: KENY Aguirre III*    Visit Date: 6/14/2022    Evaluation Date: 5/20/2022  Authorization Period Expiration: 4/25/23  Plan of Care Expiration: 7/15/22  Progress Note Due: 7/1/22  Visit # / Visits authorized: 7/ 24  FOTO: 7/7 (Captured on visit #2)    Time In: 9:52 am  Time Out: 10:30 am  Total Billable Time: 38 minutes TEx3     Precautions: Standard    Subjective     Pt reports:he has minimal pain today.  States he moved some furniture around over the weekend.  "I just pushed it - no lifting"    He was compliant with home exercise program.  Response to previous treatment: No adverse effects  Functional change: ongoing    Pain: 2.5/10  Location: left low back       Objective     Gurpreet received therapeutic exercises to develop strength, endurance, ROM, flexibility, posture and core stabilization for 38 minutes including:  Prone press ups 3x10  Open books 1x15 bilateral green theraband   Bridge blue theraband 2x15  Bridge march 2x10 bilateral blue theraband   Bird dog- LEs and UEs 10x2 ea  Lumbar extensions 10x NT  Seated thoracic extensions 2x10 1/2 foam roll  Squats 8# 2x10 NT   Hip hinge with squat:20# 2x10  Hip hinge 2x10  8# dumbells   Carry 2 laps ea UE 20#   Overhead carry 3 laps 5# therabar NT  Unilateral overhead carry 8# 2 laps ea  Shoulder extensions with lunge  2x10 green theraband   Shoulder extensions with march 1' green theraband   pallof press green theraband 20x  deadbug 2x10 N T  Leg press 3x10 7.5 plates      Therapeutic activities for instruction of and practice of postural awareness and back protection with daily activity and lifting techniques x 00 minutes including:    Body mechanics for use of weedwacker and " "lawn mower.  Log roll technique for bed mobility and supine<>sit  Postural awareness and correction with transverse abdominus activation with performance of daily activities.  Hip hinge and step out not twisting with lifting    Home Exercises Provided and Patient Education Provided     Education provided:   - as noted with Therapeutic Activity  - Importance of daily home exercise program performance    Written Home Exercises Provided: Patient instructed to cont prior HEP.  Exercises were reviewed and Gurpreet was able to demonstrate them prior to the end of the session.  Gurpreet demonstrated good  understanding of the education provided.     See EMR under Patient Instructions for exercises provided 5/20/2022.      Assessment     Patient presents to PT with minimal complaints of pain after moving furniture over weekend.  Able to inrcease reps/weight as bolded. Pt tolerated session well. Able to progress multiple dynamic core strengthening exercises and progress functional strengthening. Min cuing continues to be required for postural awareness and appropriate form. Pt reports fatigued post treatment but denies pain. "I'm okay"  Not specific to scale.    Gurpreet Is progressing well towards his goals.   Pt prognosis is Good.     Pt will continue to benefit from skilled outpatient physical therapy to address the deficits listed in the problem list box on initial evaluation, provide pt/family education and to maximize pt's level of independence in the home and community environment.     Pt's spiritual, cultural and educational needs considered and pt agreeable to plan of care and goals.    Anticipated barriers to physical therapy: chronicity of current injury, objective abilities vs. self-perceived abilities and self-limiting behaviors due to pain    Goals:      Short Term Goals (3 Weeks):  1. Patient will be compliant with home exercise program to supplement therapy in promoting functional mobility.  2. Patient will perform 12lb " deadlift with good control to demonstrate improved core/LE strength.  3. Patient will report no pain during thoracolumbar active range of motion to promote functional mobility.  4. Patient will improve impaired lower extremity hamstring and hip strength by 3kg+ to improve strength for functional tasks.     Long Term Goals (8 Weeks):   1. Patient will improve FOTO score to </= 48% limited to decrease perceived limitation with maintaining/changing body position.   2. Patient will perform floor to waist lift with 45# Kettle bell with good control to demonstrate improved core and lower extremity  Strength to perform job duties   3. Patient will be able to perform 45 mins of standing exercises to improve tolerance for standing.   4. Patient will report no pain during 50x squatting promote tolerance for job duties.  5. Pt will return to work full duty, full time.      Plan     Continue PT per plan of care - progress with core stabilization and strengthening as able.    Carly Leon, PTA

## 2022-06-15 ENCOUNTER — CLINICAL SUPPORT (OUTPATIENT)
Dept: REHABILITATION | Facility: HOSPITAL | Age: 32
End: 2022-06-15
Payer: OTHER GOVERNMENT

## 2022-06-15 DIAGNOSIS — M53.86 DECREASED ROM OF LUMBAR SPINE: Primary | ICD-10-CM

## 2022-06-15 DIAGNOSIS — R53.1 DECREASED STRENGTH: ICD-10-CM

## 2022-06-15 DIAGNOSIS — M54.50 ACUTE BILATERAL LOW BACK PAIN WITHOUT SCIATICA: ICD-10-CM

## 2022-06-15 PROCEDURE — 97110 THERAPEUTIC EXERCISES: CPT | Mod: PN,CQ

## 2022-06-15 NOTE — PROGRESS NOTES
"  Physical Therapy Daily Treatment Note     [facility]  [id number]    Therapy Diagnosis:   Encounter Diagnoses   Name Primary?    Decreased ROM of lumbar spine Yes    Decreased strength     Acute bilateral low back pain without sciatica      Physician: KENY Aguirre III*    Visit Date: 6/15/2022    Evaluation Date: 5/20/2022  Authorization Period Expiration: 4/25/23  Plan of Care Expiration: 7/15/22  Progress Note Due: 7/1/22  Visit # / Visits authorized: 7/ 24  FOTO: 8/7 (Captured on visit #2)  NEXT    Time In: 9:15 am  Time Out: 10:00 am  Total Billable Time: 45 minutes TEx3     Precautions: Standard    Subjective     Pt reports:he has no pain this morning.  States doing "stretching" for home exercise program.  He was partially compliant with home exercise program.  Response to previous treatment: good  Functional change: ongoing    Pain: 0/10  Location: left low back       Objective     Gurpreet received therapeutic exercises to develop strength, endurance, ROM, flexibility, posture and core stabilization for 38 minutes including:  Prone press ups 3x10  Open books 1x15 bilateral green theraband   Bridge blue theraband 2x15  Bridge march 2x10 bilateral blue theraband   Bird dog- LEs and UEs 10x2 ea  Seated thoracic extensions 2x10 1/2 foam roll  Hip hinge with squat:25# 2x10  Hip hinge 2x10  8# dumbells   Carry 2 laps ea UE 25#   Overhead carry 3 laps 5# therabar NT  Unilateral overhead carry 8# 2 laps ea  Shoulder extensions with static lunge  2x15 green theraband   Shoulder extensions with march 1' green theraband   pallof press green theraband 20x  Blue next?  Leg press 3x10 7.5 plates     Therapeutic activities for instruction of and practice of postural awareness and back protection with daily activity and lifting techniques x 00 minutes including:    Body mechanics for use of weedwacker and .  Log roll technique for bed mobility and supine<>sit  Postural awareness " "and correction with transverse abdominus activation with performance of daily activities.  Hip hinge and step out not twisting with lifting    Home Exercises Provided and Patient Education Provided     Education provided:   - as noted with Therapeutic Activity  - Importance of daily home exercise program performance  Given green and blue theraband for use with home exercise program on 6/15/2022    Written Home Exercises Provided: Patient instructed to cont prior HEP.  Exercises were reviewed and Gurpreet was able to demonstrate them prior to the end of the session.  Gurpreet demonstrated good  understanding of the education provided.     See EMR under Patient Instructions for exercises provided 5/20/2022.      Assessment     Patient presents to PT without complaints of pain today.  Reviewed importance of home exercise program - not just stretching.  Given theraband (green and blue) for use with same to increase compliance.  Able to inrcease reps/weight as bolded. Pt tolerated session well. Able to progress multiple dynamic core strengthening exercises and progress functional strengthening. Min cuing continues to be required for postural awareness and appropriate form. Pt reports fatigued post treatment but denies pain. "I'm tired but no pain"      Gurpreet Is progressing well towards his goals.   Pt prognosis is Good.     Pt will continue to benefit from skilled outpatient physical therapy to address the deficits listed in the problem list box on initial evaluation, provide pt/family education and to maximize pt's level of independence in the home and community environment.     Pt's spiritual, cultural and educational needs considered and pt agreeable to plan of care and goals.    Anticipated barriers to physical therapy: chronicity of current injury, objective abilities vs. self-perceived abilities and self-limiting behaviors due to pain    Goals:      Short Term Goals (3 Weeks):  1. Patient will be compliant with home " exercise program to supplement therapy in promoting functional mobility.  2. Patient will perform 12lb deadlift with good control to demonstrate improved core/LE strength.  3. Patient will report no pain during thoracolumbar active range of motion to promote functional mobility.  4. Patient will improve impaired lower extremity hamstring and hip strength by 3kg+ to improve strength for functional tasks.     Long Term Goals (8 Weeks):   1. Patient will improve FOTO score to </= 48% limited to decrease perceived limitation with maintaining/changing body position.   2. Patient will perform floor to waist lift with 45# Kettle bell with good control to demonstrate improved core and lower extremity  Strength to perform job duties   3. Patient will be able to perform 45 mins of standing exercises to improve tolerance for standing.   4. Patient will report no pain during 50x squatting promote tolerance for job duties.  5. Pt will return to work full duty, full time.      Plan     Continue PT per plan of care - progress with core stabilization and strengthening as able.    Carly Leon, PTA

## 2022-06-17 ENCOUNTER — CLINICAL SUPPORT (OUTPATIENT)
Dept: REHABILITATION | Facility: HOSPITAL | Age: 32
End: 2022-06-17
Payer: OTHER GOVERNMENT

## 2022-06-17 DIAGNOSIS — M53.86 DECREASED ROM OF LUMBAR SPINE: Primary | ICD-10-CM

## 2022-06-17 DIAGNOSIS — M54.50 ACUTE BILATERAL LOW BACK PAIN WITHOUT SCIATICA: ICD-10-CM

## 2022-06-17 DIAGNOSIS — R53.1 DECREASED STRENGTH: ICD-10-CM

## 2022-06-17 PROCEDURE — 97530 THERAPEUTIC ACTIVITIES: CPT | Mod: PN

## 2022-06-17 PROCEDURE — 97110 THERAPEUTIC EXERCISES: CPT | Mod: PN

## 2022-06-17 NOTE — PROGRESS NOTES
"  Physical Therapy Daily Treatment Note     Name: Gurpreet Youngblood  Clinic Number: [id number]    Therapy Diagnosis:   Encounter Diagnoses   Name Primary?    Decreased ROM of lumbar spine Yes    Decreased strength     Acute bilateral low back pain without sciatica      Physician: KENY Aguirre III*    Visit Date: 6/17/2022    Evaluation Date: 5/20/2022  Authorization Period Expiration: 4/25/23  Plan of Care Expiration: 7/15/22  Progress Note Due: 7/1/22  Visit # / Visits authorized: 9/ 24  FOTO: 1/5 2nd complete 6/17/22    Time In: 8:40 am  Time Out: 9:15 am  Total Billable Time: 35 minutes TEx1, 1 TA     Precautions: Standard    Subjective     Pt reports:he has no pain today. Returns to MD for follow up mid July.   He was partially compliant with home exercise program.  Response to previous treatment: good  Functional change: ongoing    Pain: 0/10  Location: left low back       Objective   FOTO: 43% limitation  Functional Job Specific Testing:      Job Specific Task Job Demands Initial ability Current Ability Deficit?   (Yes or No)   1. Lifting suitcase  50lbs  10lb with pain 25lb without pain 20x yes   2. Standing  7 hrs  15 min Per FOTO: "I can stand as long as I want , but  it increases my pain." yes   3. Squatting  50x a day  15x with pain 30x 25 lbs with min pain  yes   4. Unilateral carry 50lbs  10lb with pain 25lbs x 300 ft         Gurpreet received therapeutic exercises to develop strength, endurance, ROM, flexibility, posture and core stabilization for 20 minutes including:  Prone press ups 3x10  Open books 1x15 bilateral green theraband   Bridge blue theraband 2x10  Bridge march 2x10 bilateral blue theraband   Bird dog- LEs and UEs 10x2 ea NT  Quadruped single arm row 1x10, 1x10 contralateral leg extended 9#  Seated thoracic extensions 2x10 1/2 foam roll  Shoulder extensions with static lunge  2x15 green theraband   Shoulder extensions with march 1' green theraband NT  pallof press green theraband " 20x  Blue next? NT  Leg press 3x10 8 plates     Therapeutic activities for instruction of and practice of postural awareness and back protection with daily activity and lifting techniques x 15 minutes including:  Suitcase lift 25lbs 2x10  Hip hinge with squat:25# 2x10  Hip hinge 2x10  8# dumbells NT  Carry 2 laps ea UE 25#   Overhead carry 3 laps 5# therabar NT  Unilateral overhead carry 8# 2 laps ea    Not today:  Body mechanics for use of weedwacker and .  Log roll technique for bed mobility and supine<>sit  Postural awareness and correction with transverse abdominus activation with performance of daily activities.  Hip hinge and step out not twisting with lifting    Home Exercises Provided and Patient Education Provided     Education provided:   - as noted with Therapeutic Activity  - Importance of daily home exercise program performance  Given green and blue theraband for use with home exercise program on 6/15/2022    Written Home Exercises Provided: Patient instructed to cont prior HEP.  Exercises were reviewed and Gurpreet was able to demonstrate them prior to the end of the session.  Gurpreet demonstrated good  understanding of the education provided.     See EMR under Patient Instructions for exercises provided 5/20/2022.      Assessment     Patient presents to PT without complaints of pain today. Treatment focused on functional strengthening, core stabilization, lifting mechanics and spinal mobility. Pt demos improving capacity and tolerance for work related duties with ability to lift 25lbs without complaints of pain. Pt progressing well with there-ex. No increased pain post tx.     Gurpreet Is progressing well towards his goals.   Pt prognosis is Good.     Pt will continue to benefit from skilled outpatient physical therapy to address the deficits listed in the problem list box on initial evaluation, provide pt/family education and to maximize pt's level of independence in the home and community  environment.     Pt's spiritual, cultural and educational needs considered and pt agreeable to plan of care and goals.    Anticipated barriers to physical therapy: chronicity of current injury, objective abilities vs. self-perceived abilities and self-limiting behaviors due to pain    Goals:      Short Term Goals (3 Weeks):  1. Patient will be compliant with home exercise program to supplement therapy in promoting functional mobility.  2. Patient will perform 12lb deadlift with good control to demonstrate improved core/LE strength. Met 6/17/22  3. Patient will report no pain during thoracolumbar active range of motion to promote functional mobility.  4. Patient will improve impaired lower extremity hamstring and hip strength by 3kg+ to improve strength for functional tasks.     Long Term Goals (8 Weeks):   1. Patient will improve FOTO score to </= 48% limited to decrease perceived limitation with maintaining/changing body position. Met 6/17/22  2. Patient will perform floor to waist lift with 45# Kettle bell with good control to demonstrate improved core and lower extremity  Strength to perform job duties   3. Patient will be able to perform 45 mins of standing exercises to improve tolerance for standing.   4. Patient will report no pain during 50x squatting promote tolerance for job duties.  5. Pt will return to work full duty, full time.      Plan     Continue PT per plan of care - progress with core stabilization and strengthening as able.    DRU KIRBY, PT

## 2022-06-20 ENCOUNTER — CLINICAL SUPPORT (OUTPATIENT)
Dept: REHABILITATION | Facility: HOSPITAL | Age: 32
End: 2022-06-20
Payer: OTHER GOVERNMENT

## 2022-06-20 DIAGNOSIS — R53.1 DECREASED STRENGTH: ICD-10-CM

## 2022-06-20 DIAGNOSIS — M53.86 DECREASED ROM OF LUMBAR SPINE: Primary | ICD-10-CM

## 2022-06-20 DIAGNOSIS — M54.50 ACUTE BILATERAL LOW BACK PAIN WITHOUT SCIATICA: ICD-10-CM

## 2022-06-20 PROCEDURE — 97530 THERAPEUTIC ACTIVITIES: CPT | Mod: PN

## 2022-06-20 PROCEDURE — 97110 THERAPEUTIC EXERCISES: CPT | Mod: PN

## 2022-06-20 NOTE — PROGRESS NOTES
"  Physical Therapy Daily Treatment Note     Name: Gurpreet Youngblood  Clinic Number: [id number]    Therapy Diagnosis:   Encounter Diagnoses   Name Primary?    Decreased ROM of lumbar spine Yes    Decreased strength     Acute bilateral low back pain without sciatica      Physician: KENY Aguirre III*    Visit Date: 6/20/2022    Evaluation Date: 5/20/2022  Authorization Period Expiration: 4/25/23  Plan of Care Expiration: 7/15/22  Progress Note Due: 7/1/22  Visit # / Visits authorized: 9/ 24  FOTO: 2/5 2nd complete 6/17/22    Time In: 12:49 pm  Time Out: 1:30 am  Total Billable Time: 41 minutes TEx2, 1 TA     Precautions: Standard    Subjective     Pt reports:he has no pain today. No pain after last session. Pain with sitting/standing 45 min-hr .   He was partially compliant with home exercise program.  Response to previous treatment: good  Functional change: ongoing    Pain: 0/10  Location: left low back       Objective     Functional Job Specific Testing:      Job Specific Task Job Demands Initial ability Current Ability Deficit?   (Yes or No)   1. Lifting suitcase  50lbs  10lb with pain 25lb without pain 20x yes   2. Standing  7 hrs  15 min Per FOTO: "I can stand as long as I want , but  it increases my pain." yes   3. Squatting  50x a day  15x with pain 30x 30 lbs with min pain  yes   4. Unilateral carry 50lbs  10lb with pain 25lbs x 300 ft         Gurpreet received therapeutic exercises to develop strength, endurance, ROM, flexibility, posture and core stabilization for 25 minutes including:  Prone press ups 3x10  Open books 1x15 bilateral green theraband   Seated thoracic extensions 2x10 1/2 foam roll  static lunge  2x8 8# KB  pallof + overhead press 20x  Blue   Leg press 3x10 8 plates  Step up march blue theraband blue step 3x10  Plank -next    Quadruped single arm row 1x10, 1x10 contralateral leg extended 9# NT  Bridge blue theraband 2x10 NT  Bridge march 2x10 bilateral blue theraband  NT  Bird dog- LEs " and UEs 10x2 ea NT     Therapeutic activities for instruction of and practice of postural awareness and back protection with daily activity and lifting techniques x 16 minutes including:  Suitcase lift 25lbs 2x10- OOT   Hip hinge with squat:25# 1x10, 30# 2x10  Straight leg Deadlift  2x10  8# dumbells NT  Carry 2 laps ea UE 25#   Overhead carry 3 laps 5# therabar NT  Unilateral overhead carry 8# 2 laps ea  Floor to waist lift with rotation 20# + box 8x ea side    Not today:  Body mechanics for use of weedwacker and .  Log roll technique for bed mobility and supine<>sit  Postural awareness and correction with transverse abdominus activation with performance of daily activities.    Home Exercises Provided and Patient Education Provided     Education provided:   - as noted with Therapeutic Activity  - Importance of daily home exercise program performance  Given green and blue theraband for use with home exercise program on 6/15/2022    Written Home Exercises Provided: Patient instructed to cont prior HEP.  Exercises were reviewed and Gurpreet was able to demonstrate them prior to the end of the session.  Gurpreet demonstrated good  understanding of the education provided.     See EMR under Patient Instructions for exercises provided 5/20/2022.      Assessment     Patient continues to present without pain at time of session though complains of pain with prolonged sitting/standing >1 hr. Utilized mat for mobility exercises then heavy focus on standing and strengthening exercises and biomechanics. >30 minutes spent on standing exercises. Continued to work on progressive resistance training for core and functional mobility. Pt required occasional rest breaks with exercises due to fatigue but without complaints of back pain.  Mod cuing for form with new exercises.  lower extremity fatigue reported post tx but without increased pain.     Gurpreet Is progressing well towards his goals.   Pt prognosis is Good.     Pt will  continue to benefit from skilled outpatient physical therapy to address the deficits listed in the problem list box on initial evaluation, provide pt/family education and to maximize pt's level of independence in the home and community environment.     Pt's spiritual, cultural and educational needs considered and pt agreeable to plan of care and goals.    Anticipated barriers to physical therapy: chronicity of current injury, objective abilities vs. self-perceived abilities and self-limiting behaviors due to pain    Goals:      Short Term Goals (3 Weeks):  1. Patient will be compliant with home exercise program to supplement therapy in promoting functional mobility.  2. Patient will perform 12lb deadlift with good control to demonstrate improved core/LE strength. Met 6/17/22  3. Patient will report no pain during thoracolumbar active range of motion to promote functional mobility.  4. Patient will improve impaired lower extremity hamstring and hip strength by 3kg+ to improve strength for functional tasks.     Long Term Goals (8 Weeks):   1. Patient will improve FOTO score to </= 48% limited to decrease perceived limitation with maintaining/changing body position. Met 6/17/22  2. Patient will perform floor to waist lift with 45# Kettle bell with good control to demonstrate improved core and lower extremity  Strength to perform job duties   3. Patient will be able to perform 45 mins of standing exercises to improve tolerance for standing.   4. Patient will report no pain during 50x squatting promote tolerance for job duties.  5. Pt will return to work full duty, full time.      Plan     Continue PT per plan of care - progress with core stabilization and strengthening as able.    DRU KIRBY, PT

## 2022-06-22 ENCOUNTER — CLINICAL SUPPORT (OUTPATIENT)
Dept: REHABILITATION | Facility: HOSPITAL | Age: 32
End: 2022-06-22
Payer: OTHER GOVERNMENT

## 2022-06-22 DIAGNOSIS — M54.50 ACUTE BILATERAL LOW BACK PAIN WITHOUT SCIATICA: ICD-10-CM

## 2022-06-22 DIAGNOSIS — M53.86 DECREASED ROM OF LUMBAR SPINE: Primary | ICD-10-CM

## 2022-06-22 DIAGNOSIS — R53.1 DECREASED STRENGTH: ICD-10-CM

## 2022-06-22 PROCEDURE — 97110 THERAPEUTIC EXERCISES: CPT | Mod: PN,CQ

## 2022-06-22 PROCEDURE — 97530 THERAPEUTIC ACTIVITIES: CPT | Mod: PN,CQ

## 2022-06-22 NOTE — PROGRESS NOTES
"  Physical Therapy Daily Treatment Note     [facility]  [facility] [id number]    Therapy Diagnosis:   Encounter Diagnoses   Name Primary?    Decreased ROM of lumbar spine Yes    Decreased strength     Acute bilateral low back pain without sciatica      Physician: KENY Aguirre III*    Visit Date: 6/22/2022    Evaluation Date: 5/20/2022  Authorization Period Expiration: 4/25/23  Plan of Care Expiration: 7/15/22  Progress Note Due: 7/1/22  Visit # / Visits authorized: 11/ 24  FOTO: 3/5 2nd complete 6/17/22    Time In: 8:35 am  Time Out: 9:15 am  Total Billable Time: 40 minutes TEx2, TA x 1     Precautions: Standard    Subjective     Pt reports:he has no pain today. No pain after last session. Pain with sitting/standing 45 min-hr .   He was partially compliant with home exercise program.  Response to previous treatment: good  Functional change: ongoing    Pain: 0/10  Location: left low back       Objective      Gurpreet received therapeutic exercises to develop strength, endurance, ROM, flexibility, posture and core stabilization for 25 minutes including:  Prone press ups 3x10  Open books 1x15 bilateral green theraband   Seated thoracic extensions 2x10 1/2 foam roll  static lunge  2x8 8# KB  pallof + overhead press 20x  Blue   Leg press 4x10 8 plates  Step up march blue theraband blue step 3x10  Plank - forward only 30" x 3    Quadruped single arm row 1x10, 1x10 contralateral leg extended 9# NT  Bridge blue theraband 2x10 NT  Bridge march 2x10 bilateral blue theraband  NT  Bird dog- LEs and UEs 10x2 ea NT     Therapeutic activities for instruction of and practice of postural awareness and back protection with daily activity and lifting techniques x 15 minutes including:  Suitcase lift 25lbs 2x10- OOT   Hip hinge with squat:25# 1x10, 30# 2x10  Straight leg Deadlift  2x10  8# dumbells NT  Carry 1 lap ea UE 30#   Overhead carry 3 laps 5# therabar NT  Unilateral overhead carry 8# 2 laps ea  Floor to " waist lift with rotation 20# + box 8x ea side NT    Not today:  Body mechanics for use of weedwacker and .  Log roll technique for bed mobility and supine<>sit  Postural awareness and correction with transverse abdominus activation with performance of daily activities.    Home Exercises Provided and Patient Education Provided     Education provided:   - as noted with Therapeutic Activity  - Importance of daily home exercise program performance  Given green and blue theraband for use with home exercise program on 6/15/2022    Written Home Exercises Provided: Patient instructed to cont prior HEP.  Exercises were reviewed and Gurpreet was able to demonstrate them prior to the end of the session.  Gurpreet demonstrated good  understanding of the education provided.     See EMR under Patient Instructions for exercises provided 5/20/2022.      Assessment     Patient continues to present without pain at time of session though complains of pain with prolonged sitting/standing >1 hr. Utilized mat for mobility exercises then heavy focus on standing and strengthening exercises and biomechanics. >30 minutes spent on standing exercises. Continued to work on progressive resistance training for core and functional mobility. Pt required occasional rest breaks with exercises due to fatigue but without complaints of back pain.  Mod cuing for form with new exercises.  lower extremity fatigue reported post tx but without increased pain.     Gurpreet Is progressing well towards his goals.   Pt prognosis is Good.     Pt will continue to benefit from skilled outpatient physical therapy to address the deficits listed in the problem list box on initial evaluation, provide pt/family education and to maximize pt's level of independence in the home and community environment.     Pt's spiritual, cultural and educational needs considered and pt agreeable to plan of care and goals.    Anticipated barriers to physical therapy: chronicity of current  injury, objective abilities vs. self-perceived abilities and self-limiting behaviors due to pain    Goals:      Short Term Goals (3 Weeks):  1. Patient will be compliant with home exercise program to supplement therapy in promoting functional mobility.  2. Patient will perform 12lb deadlift with good control to demonstrate improved core/LE strength. Met 6/17/22  3. Patient will report no pain during thoracolumbar active range of motion to promote functional mobility.  4. Patient will improve impaired lower extremity hamstring and hip strength by 3kg+ to improve strength for functional tasks.     Long Term Goals (8 Weeks):   1. Patient will improve FOTO score to </= 48% limited to decrease perceived limitation with maintaining/changing body position. Met 6/17/22  2. Patient will perform floor to waist lift with 45# Kettle bell with good control to demonstrate improved core and lower extremity  Strength to perform job duties   3. Patient will be able to perform 45 mins of standing exercises to improve tolerance for standing.   4. Patient will report no pain during 50x squatting promote tolerance for job duties.  5. Pt will return to work full duty, full time.      Plan     Continue PT per plan of care - progress with core stabilization and strengthening as able.    Carly Leon, PTA

## 2022-06-27 ENCOUNTER — CLINICAL SUPPORT (OUTPATIENT)
Dept: REHABILITATION | Facility: HOSPITAL | Age: 32
End: 2022-06-27
Payer: OTHER GOVERNMENT

## 2022-06-27 DIAGNOSIS — M54.50 ACUTE BILATERAL LOW BACK PAIN WITHOUT SCIATICA: ICD-10-CM

## 2022-06-27 DIAGNOSIS — R53.1 DECREASED STRENGTH: ICD-10-CM

## 2022-06-27 DIAGNOSIS — M53.86 DECREASED ROM OF LUMBAR SPINE: Primary | ICD-10-CM

## 2022-06-27 PROCEDURE — 97530 THERAPEUTIC ACTIVITIES: CPT | Mod: PN,CQ

## 2022-06-27 PROCEDURE — 97110 THERAPEUTIC EXERCISES: CPT | Mod: PN,CQ

## 2022-06-27 NOTE — PROGRESS NOTES
"  Physical Therapy Daily Treatment Note     [facility]  [facility] [id number]    Therapy Diagnosis:   Encounter Diagnoses   Name Primary?    Decreased ROM of lumbar spine Yes    Decreased strength     Acute bilateral low back pain without sciatica      Physician: KENY Aguirre III*    Visit Date: 6/27/2022    Evaluation Date: 5/20/2022  Authorization Period Expiration: 4/25/23  Plan of Care Expiration: 7/15/22  Progress Note Due: 7/1/22  Visit # / Visits authorized: 12/ 24  FOTO: 4/5 2nd complete 6/17/22  PTA Visit #: 2/5    Time In: 8:50 am  Time Out: 9:30 am  Total Billable Time: 40 minutes TEx2, TA x 1     Precautions: Standard    Subjective     Pt reports: minimal pain upon arrival, very sore following strengthening progressions on Friday.     He was partially compliant with home exercise program.  Response to previous treatment: very sore  Functional change: ongoing    Pain: 2/10  Location: left low back       Objective      Gurpreet received therapeutic exercises to develop strength, endurance, ROM, flexibility, posture and core stabilization for 25 minutes including:  Prone press ups 3x10  Open books 1x15 bilateral green theraband   Seated thoracic extensions 2x10 1/2 foam roll  static lunge  2x8 8# KB  pallof + overhead press 20x  Blue   Leg press 4x10 8 plates  Step up march blue theraband blue step 3x10  Plank - forward only 30" x 3    Quadruped single arm row 1x10, 1x10 contralateral leg extended 9# NT  Bridge blue theraband 2x10 NT  Bridge march 2x10 bilateral blue theraband  NT  Bird dog- LEs and UEs 10x2 ea NT     Therapeutic activities for instruction of and practice of postural awareness and back protection with daily activity and lifting techniques x 15 minutes including:  Suitcase lift 25lbs 2x10  Hip hinge with squat:25# 1x10, 30# 2x10  Straight leg Deadlift  2x10  8# dumbells NT  Carry 1 lap ea UE 30#   Overhead carry 3 laps 5# therabar NT  Unilateral overhead carry 8# 2 " laps ea  Floor to waist lift with rotation 20# + box 8x ea side NT    Not today:  Body mechanics for use of weedwacker and .  Log roll technique for bed mobility and supine<>sit  Postural awareness and correction with transverse abdominus activation with performance of daily activities.    Home Exercises Provided and Patient Education Provided     Education provided:   - as noted with Therapeutic Activity  - Importance of daily home exercise program performance  Given green and blue theraband for use with home exercise program on 6/15/2022    Written Home Exercises Provided: Patient instructed to cont prior HEP.  Exercises were reviewed and Gurpreet was able to demonstrate them prior to the end of the session.  Gurpreet demonstrated good  understanding of the education provided.     See EMR under Patient Instructions for exercises provided 5/20/2022.      Assessment     Gurpreet arrived to session with minimal complaints of back pain and was agreeable to treatment.  Session continues to focus on progressive resistance training for core and functional mobility.  Verbal cuing required for proper technique during lifting activities which improved following demonstrations.  Pt required brief standing rest breaks for fatigue recovery following higher intensity strengthening exercises. He denied any increase in back pain throughout session. Gurpreet would benefit from additional work hardening activity to achieve functional goals set at initial evaluation in preparation for return to work.     Gurpreet Is progressing well towards his goals.   Pt prognosis is Good.     Pt will continue to benefit from skilled outpatient physical therapy to address the deficits listed in the problem list box on initial evaluation, provide pt/family education and to maximize pt's level of independence in the home and community environment.     Pt's spiritual, cultural and educational needs considered and pt agreeable to plan of care and  goals.    Anticipated barriers to physical therapy: chronicity of current injury, objective abilities vs. self-perceived abilities and self-limiting behaviors due to pain    Goals:      Short Term Goals (3 Weeks):  1. Patient will be compliant with home exercise program to supplement therapy in promoting functional mobility.  2. Patient will perform 12lb deadlift with good control to demonstrate improved core/LE strength. Met 6/17/22  3. Patient will report no pain during thoracolumbar active range of motion to promote functional mobility.  4. Patient will improve impaired lower extremity hamstring and hip strength by 3kg+ to improve strength for functional tasks.     Long Term Goals (8 Weeks):   1. Patient will improve FOTO score to </= 48% limited to decrease perceived limitation with maintaining/changing body position. Met 6/17/22  2. Patient will perform floor to waist lift with 45# Kettle bell with good control to demonstrate improved core and lower extremity  Strength to perform job duties   3. Patient will be able to perform 45 mins of standing exercises to improve tolerance for standing.   4. Patient will report no pain during 50x squatting promote tolerance for job duties.  5. Pt will return to work full duty, full time.      Plan     Continue PT per plan of care - progress with core stabilization and strengthening as able.    Oralia uFentes, PTA

## 2022-06-29 ENCOUNTER — DOCUMENTATION ONLY (OUTPATIENT)
Dept: REHABILITATION | Facility: HOSPITAL | Age: 32
End: 2022-06-29
Payer: OTHER GOVERNMENT

## 2022-06-29 ENCOUNTER — CLINICAL SUPPORT (OUTPATIENT)
Dept: REHABILITATION | Facility: HOSPITAL | Age: 32
End: 2022-06-29
Payer: OTHER GOVERNMENT

## 2022-06-29 DIAGNOSIS — M54.50 ACUTE BILATERAL LOW BACK PAIN WITHOUT SCIATICA: ICD-10-CM

## 2022-06-29 DIAGNOSIS — R53.1 DECREASED STRENGTH: ICD-10-CM

## 2022-06-29 DIAGNOSIS — M53.86 DECREASED ROM OF LUMBAR SPINE: Primary | ICD-10-CM

## 2022-06-29 PROCEDURE — 97110 THERAPEUTIC EXERCISES: CPT | Mod: PN,CQ

## 2022-06-29 PROCEDURE — 97530 THERAPEUTIC ACTIVITIES: CPT | Mod: PN,CQ

## 2022-06-29 NOTE — PROGRESS NOTES
"  Physical Therapy Daily Treatment Note     [facility]  [facility] [id number]    Therapy Diagnosis:   Encounter Diagnoses   Name Primary?    Decreased ROM of lumbar spine Yes    Decreased strength     Acute bilateral low back pain without sciatica      Physician: KENY Aguirre III*    Visit Date: 6/29/2022    Evaluation Date: 5/20/2022  Authorization Period Expiration: 4/25/23  Plan of Care Expiration: 7/15/22  Progress Note Due: 7/1/22  Visit # / Visits authorized: 13/ 24  FOTO: 5/5 2nd complete 6/17/22  NEXT  PTA Visit #: 3/5    Time In: 9:30 am  Time Out: 10:15 am  Total Billable Time: 45 minutes TE x 2, TA x 1     Precautions: Standard    Subjective     Pt reports: minimal pain upon arrival, arms a little sore after last visit, but not severe like the time before   He was partially compliant with home exercise program.  Response to previous treatment: no adverse effects  Functional change: ongoing    Pain: 1/10  Location: left low back       Objective      Gurpreet received therapeutic exercises to develop strength, endurance, ROM, flexibility, posture and core stabilization for 30 minutes including:  Prone press ups 3x10  Open books 1x15 bilateral green theraband   Seated thoracic extensions 2x10 1/2 foam roll  static lunge  2x8 12# KB  pallof + overhead press 20x  Blue   Leg press 3x15 8 plates  Step up march blue theraband blue step 3x10  Plank - forward only 30" x 3    Quadruped single arm row 1x10, 1x10 contralateral leg extended 9# NT  Bridge blue theraband 2x10 NT  Bridge march 2x10 bilateral blue theraband  NT  Bird dog- LEs and UEs 10x2 ea NT     Therapeutic activities for instruction of and practice of postural awareness and back protection with daily activity and lifting techniques x 15 minutes including:  Suitcase lift 26 lbs 3x10 bilateral   Hip hinge with squat:25# 1x10, 30# 2x10  Straight leg Deadlift  2x10  8# dumbells NT  Carry 2 laps ea UE 30#   Overhead carry 3 laps 5# " therabar NT  Unilateral overhead carry 9# 2 laps ea  Floor to waist lift with rotation 20# + box 8x ea side NT    Not today:  Body mechanics for use of weedwacker and .  Log roll technique for bed mobility and supine<>sit  Postural awareness and correction with transverse abdominus activation with performance of daily activities.    Home Exercises Provided and Patient Education Provided     Education provided:   - as noted with Therapeutic Activity  - Importance of daily home exercise program performance  Given green and blue theraband for use with home exercise program on 6/15/2022    Written Home Exercises Provided: Patient instructed to cont prior HEP.  Exercises were reviewed and Gurpreet was able to demonstrate them prior to the end of the session.  Gurpreet demonstrated good  understanding of the education provided.     See EMR under Patient Instructions for exercises provided 5/20/2022.      Assessment     Gurpreet arrived to session with minimal complaints of back pain and was agreeable to treatment.  Session continues to focus on progressive resistance training for core and functional mobility.  Verbal cuing required for proper technique during lifting activities which improved following demonstrations.  Pt required brief standing rest breaks for fatigue recovery following higher intensity strengthening exercises. Increased weight and reps as bolded. Performed suitcase lift bilaterally.  Difficulty maintaining good form with plank.  Required intermittent verbal cues to correct but able to completeHe denied any increase in back pain throughout session. Gurpreet would benefit from additional work hardening activity to achieve functional goals set at initial evaluation in preparation for return to work.     Gurpreet Is progressing well towards his goals.   Pt prognosis is Good.     Pt will continue to benefit from skilled outpatient physical therapy to address the deficits listed in the problem list box on initial  evaluation, provide pt/family education and to maximize pt's level of independence in the home and community environment.     Pt's spiritual, cultural and educational needs considered and pt agreeable to plan of care and goals.    Anticipated barriers to physical therapy: chronicity of current injury, objective abilities vs. self-perceived abilities and self-limiting behaviors due to pain    Goals:      Short Term Goals (3 Weeks):  1. Patient will be compliant with home exercise program to supplement therapy in promoting functional mobility.  2. Patient will perform 12lb deadlift with good control to demonstrate improved core/LE strength. Met 6/17/22  3. Patient will report no pain during thoracolumbar active range of motion to promote functional mobility.  4. Patient will improve impaired lower extremity hamstring and hip strength by 3kg+ to improve strength for functional tasks.     Long Term Goals (8 Weeks):   1. Patient will improve FOTO score to </= 48% limited to decrease perceived limitation with maintaining/changing body position. Met 6/17/22  2. Patient will perform floor to waist lift with 45# Kettle bell with good control to demonstrate improved core and lower extremity  Strength to perform job duties   3. Patient will be able to perform 45 mins of standing exercises to improve tolerance for standing.   4. Patient will report no pain during 50x squatting promote tolerance for job duties.  5. Pt will return to work full duty, full time.      Plan     Continue PT per plan of care - progress with core stabilization and strengthening as able.    Carly Leon, PTA

## 2022-06-29 NOTE — PROGRESS NOTES
Physical therapist and physical therapy assistant(s) met face to face to discuss patient's treatment plan and progress towards established goals. Pt will be seen by a physical therapist minimally every 6th visit or every 30 days.    DRU KIRBY, PT, DPT    Oralia Fuentes, PTA      Carly Leon, PTA

## 2022-07-01 ENCOUNTER — CLINICAL SUPPORT (OUTPATIENT)
Dept: REHABILITATION | Facility: HOSPITAL | Age: 32
End: 2022-07-01
Payer: OTHER GOVERNMENT

## 2022-07-01 DIAGNOSIS — M54.50 ACUTE BILATERAL LOW BACK PAIN WITHOUT SCIATICA: Primary | ICD-10-CM

## 2022-07-01 PROCEDURE — 97110 THERAPEUTIC EXERCISES: CPT | Mod: PN

## 2022-07-01 PROCEDURE — 97530 THERAPEUTIC ACTIVITIES: CPT | Mod: PN

## 2022-07-06 ENCOUNTER — DOCUMENTATION ONLY (OUTPATIENT)
Dept: REHABILITATION | Facility: HOSPITAL | Age: 32
End: 2022-07-06
Payer: OTHER GOVERNMENT

## 2022-07-06 NOTE — PROGRESS NOTES
"Outpatient Therapy Compliance Update     Name: Gurpreet Youngblood  Clinic Number: [id number]    Care Update      Today Gurpreet Youngblood no-showed to his/her Physical Therapy appointment.   Gurpreet Youngblood was contacted regarding cancelled appointment 7/6/22 with reason "can't make it". This is 5th total missed visit. No answer left voicemail. Informed pt of available appointment on 7/7/22 at 12:00 pm in order to get pt in for his recommended 2x/week and requested call back to confirm. Pt reminded of attendance policy. Overall pt with steady improvements with mild low back pain of  0-2/10 pain in last 3 weeks and improved body mechanics.    Date of next scheduled appointment: 7/8/22 at 8:30    DRU KIRBY, PT      "

## 2022-07-08 ENCOUNTER — CLINICAL SUPPORT (OUTPATIENT)
Dept: REHABILITATION | Facility: HOSPITAL | Age: 32
End: 2022-07-08
Payer: OTHER GOVERNMENT

## 2022-07-08 DIAGNOSIS — M54.50 ACUTE BILATERAL LOW BACK PAIN WITHOUT SCIATICA: Primary | ICD-10-CM

## 2022-07-08 PROCEDURE — 97110 THERAPEUTIC EXERCISES: CPT | Mod: PN

## 2022-07-08 PROCEDURE — 97530 THERAPEUTIC ACTIVITIES: CPT | Mod: PN

## 2022-07-08 NOTE — PROGRESS NOTES
"  Physical Therapy Daily Treatment Note     [facility]  [facility] Number: [id number]    Therapy Diagnosis:   Encounter Diagnosis   Name Primary?    Acute bilateral low back pain without sciatica Yes     Physician: KENY Aguirre III*    Visit Date: 7/8/2022    Evaluation Date: 5/20/2022  Authorization Period Expiration: 4/25/23  Plan of Care Expiration: 7/15/22  Progress Note Due: 7/15/22  Visit # / Visits authorized: 15/24  FOTO: 6/10 2nd complete 6/17/22    PTA Visit #: 0/5    Time In: 9:07 am  Time Out: 10:00 pm  Total Billable Time: 53 TE x 2, TA x 1     Precautions: Standard    Subjective     Pt reports: Pt reports minimal pain. Returns to MD 7/13/22.    He was partially compliant with home exercise program.  Response to previous treatment: no adverse effects  Functional change: ongoing    Pain: 1/10  Location: left low back       Objective     Functional Job Specific Testing:      Job Specific Task Job Demands Initial ability Current Ability Deficit?   (Yes or No)   1. Lifting suitcase  50lbs  10lb with pain 50lb with min pain 1x 40 ft, 65# deadlift yes   2. Standing  7 hrs  15 min Per FOTO: "I can stand as long as I want , but  it increases my pain." yes   3. Squatting  50x a day  15x with pain 30x 40 lbs with min pain  yes   4. Unilateral carry 50lbs  10lb with pain 40lbs x 300 ft        Palpation: TTP at left lumbar paraspinals, left transverse processes L3-5  Sensation: normal light touch      Range of Motion/Strength:      Lumbar AROM Pain/Dysfunction with Movement:   Flexion 60 mild left end range stretch    Extension 20     Right side bending 20    Left side bending 25*     Right rotation 75%     Left rotation 90%     *denotes pain with movement       L/E strength  Microfet(kg)/ MMT (*/5)  Right Left Pain/Dysfunction with Movement   Hip Flexion 18.3 kg 17.4 kg     Hip Abduction 11.3 kg 9.3 kg     Knee Flexion 12 kg 9.6 kg  double leg knee flexion 50lbs   Knee Extension 21.5 kg 16.6 kg " " double leg knee ext 40lbs   Ankle DF 5/5 5/5     Ankle PF 5/5 5/5     Hip extension 4+/5 4+/5  back pain left    Hip internal rotation  5/5 5/5     Hip external rotation  5/5 4+/5           Joint Mobility:   - Lumbar: decreased joint mobility throughout lumbar spine. Pain with P/A greatest L3-5  -Thoracic: decreased mobility with p/a throughout thoracic spine    Gurpreet received therapeutic exercises to develop strength, endurance, ROM, flexibility, posture and core stabilization for 30 minutes including:  Prone press ups: 3x10 NT  Open books: 15x - bilateral green theraband NT  Cybex leg press: seat 7 - 9 plates - 3x10  Static lunge  2x10 12# KB  Pallof + overhead press: 20x - blue theraband NT  Planks: 5x20"  Bird dogs: 2x10 NT  Leg extensions cybex double leg 40lbs   cybex hs curls 5 plates 3x10    Therapeutic activities for instruction of and practice of postural awareness and back protection with daily activity and lifting techniques x 23 minutes including:  Goblet squats: 35# KB - 2x10  Deadlifts: 35# KB left side & 30# KB right side - 8x NT  Suitcase lift: 35# lbs 3x10 bilateral NT  Hip hinge with squat: 40lbs 3x10  Farmer's walk: 3 laps each upper extremity - 30#   Overhead carry 3 laps 5# therabar NT  Unilateral overhead carry 9# 2 laps ea  Bag lift and carry 1x 75ft 50lbs     Home Exercises Provided and Patient Education Provided     Education provided:   - as noted with Therapeutic Activity  - Importance of daily home exercise program performance  Given green and blue theraband for use with home exercise program on 6/15/2022    Written Home Exercises Provided: Patient instructed to cont prior HEP.  Exercises were reviewed and Gurpreet was able to demonstrate them prior to the end of the session.  Gurpreet demonstrated good  understanding of the education provided.     See EMR under Patient Instructions for exercises provided 5/20/2022.      Assessment     Gurpreet is progressing well with therapy. He arrives with " minimal complaints of pain. range of motion reassessed with improvements with only minimal end range pain with flexion and side bend. lower extremity strength gradually improving with primary deficits in hamstring and hip strength. Pt with improving core utilization and body mechanics. Pt able to lift 40-65lbs with minimal complaints of pain. Patient needs to be able to lift 70# for return to work. He will continue to progress functional strength to progress towards work duties.     Gurpreet Is progressing well towards his goals.   Pt prognosis is Good.     Pt will continue to benefit from skilled outpatient physical therapy to address the deficits listed in the problem list box on initial evaluation, provide pt/family education and to maximize pt's level of independence in the home and community environment.     Pt's spiritual, cultural and educational needs considered and pt agreeable to plan of care and goals.    Anticipated barriers to physical therapy: chronicity of current injury, objective abilities vs. self-perceived abilities and self-limiting behaviors due to pain    Goals:      Short Term Goals (3 Weeks):  1. Patient will be compliant with home exercise program to supplement therapy in promoting functional mobility. Met   2. Patient will perform 12lb deadlift with good control to demonstrate improved core/LE strength. Met 6/17/22  3. Patient will report no pain during thoracolumbar active range of motion to promote functional mobility.  4. Patient will improve impaired lower extremity hamstring and hip strength by 3kg+ to improve strength for functional tasks.     Long Term Goals (8 Weeks):   1. Patient will improve FOTO score to </= 48% limited to decrease perceived limitation with maintaining/changing body position. Met 6/17/22  2. Patient will perform floor to waist lift with 45# Kettle bell with good control to demonstrate improved core and lower extremity  Strength to perform job duties   3. Patient will  be able to perform 45 mins of standing exercises to improve tolerance for standing.   4. Patient will report no pain during 50x squatting promote tolerance for job duties.  5. Pt will return to work full duty, full time.      Plan     Continue PT per plan of care - progress with core stabilization and strengthening as able.    DRU KIRBY, PT

## 2022-07-08 NOTE — PROGRESS NOTES
"  Physical Therapy Daily Treatment Note     Name: Gurpreet Youngblood  Clinic Number: [id number]    Therapy Diagnosis:   No diagnosis found.  Physician: KENY Aguirre III*    Visit Date: 7/8/2022    Evaluation Date: 5/20/2022  Authorization Period Expiration: 4/25/23  Plan of Care Expiration: 7/15/22  Progress Note Due: 7/1/22  Visit # / Visits authorized: 14/24  FOTO: 5/5 2nd complete 6/17/22  NEXT  PTA Visit #: 3/5    Time In: 11:05 am  Time Out: 12:00 pm  Total Billable Time: 55 TE x 2, TA x 1     Precautions: Standard    Subjective     Pt reports: his pain level is pretty low this morning. He reports having some abdominal soreness following last visit. Patient reports being unsure of his work situation currently because his MD gave him 30# restrictions and he needs to be able to lift 70# so his employer is not allowing him to return to work at this time.    He was partially compliant with home exercise program.  Response to previous treatment: no adverse effects  Functional change: ongoing    Pain: 1/10  Location: left low back       Objective      Gurpreet received therapeutic exercises to develop strength, endurance, ROM, flexibility, posture and core stabilization for 30 minutes including:  Prone press ups: 3x10  Open books: 15x - bilateral green theraband   Cybex leg press: seat 7 - 9 plates - 3x10  Static lunge  2x8 12# KB  Pallof + overhead press: 20x - blue theraband   Planks: 10"x10  Bird dogs: 2x10    Therapeutic activities for instruction of and practice of postural awareness and back protection with daily activity and lifting techniques x 25 minutes including:  Goblet squats: 35# KB - 2x10  Deadlifts: 35# KB left side & 30# KB right side - 8x  Suitcase lift: 35# lbs 3x10 bilateral   Hip hinge with squat: 25# 1x10, 30# 2x10  Farmer's walk: 3 laps each upper extremity - 30#   Overhead carry 3 laps 5# therabar NT  Unilateral overhead carry 9# 2 laps ea        Home Exercises Provided and Patient " Education Provided     Education provided:   - as noted with Therapeutic Activity  - Importance of daily home exercise program performance  Given green and blue theraband for use with home exercise program on 6/15/2022    Written Home Exercises Provided: Patient instructed to cont prior HEP.  Exercises were reviewed and Gurpreet was able to demonstrate them prior to the end of the session.  Gurpreet demonstrated good  understanding of the education provided.     See EMR under Patient Instructions for exercises provided 5/20/2022.      Assessment     Gurpreet arrived to session with minimal complaints of back pain and was agreeable to treatment.  Session continues to focus on progressive resistance training for core and functional mobility.  Verbal cuing required for proper technique during lifting activities which improved following demonstrations.  Patient appears to be progressing well with increasing load and intensity of exercises. Patient reported 30# restrictions by MD and able to perform higher weight in physical therapy. Achieved 65# deadlifts today for low reps and quality form. Patient needs to be able to lift 70# for return to work.     Gurpreet Is progressing well towards his goals.   Pt prognosis is Good.     Pt will continue to benefit from skilled outpatient physical therapy to address the deficits listed in the problem list box on initial evaluation, provide pt/family education and to maximize pt's level of independence in the home and community environment.     Pt's spiritual, cultural and educational needs considered and pt agreeable to plan of care and goals.    Anticipated barriers to physical therapy: chronicity of current injury, objective abilities vs. self-perceived abilities and self-limiting behaviors due to pain    Goals:      Short Term Goals (3 Weeks):  1. Patient will be compliant with home exercise program to supplement therapy in promoting functional mobility.  2. Patient will perform 12lb deadlift  with good control to demonstrate improved core/LE strength. Met 6/17/22  3. Patient will report no pain during thoracolumbar active range of motion to promote functional mobility.  4. Patient will improve impaired lower extremity hamstring and hip strength by 3kg+ to improve strength for functional tasks.     Long Term Goals (8 Weeks):   1. Patient will improve FOTO score to </= 48% limited to decrease perceived limitation with maintaining/changing body position. Met 6/17/22  2. Patient will perform floor to waist lift with 45# Kettle bell with good control to demonstrate improved core and lower extremity  Strength to perform job duties   3. Patient will be able to perform 45 mins of standing exercises to improve tolerance for standing.   4. Patient will report no pain during 50x squatting promote tolerance for job duties.  5. Pt will return to work full duty, full time.      Plan     Continue PT per plan of care - progress with core stabilization and strengthening as able.    Arianna Morley, PTA

## 2022-07-11 ENCOUNTER — CLINICAL SUPPORT (OUTPATIENT)
Dept: REHABILITATION | Facility: HOSPITAL | Age: 32
End: 2022-07-11
Payer: OTHER GOVERNMENT

## 2022-07-11 DIAGNOSIS — M54.50 ACUTE BILATERAL LOW BACK PAIN WITHOUT SCIATICA: Primary | ICD-10-CM

## 2022-07-11 PROCEDURE — 97110 THERAPEUTIC EXERCISES: CPT | Mod: PN

## 2022-07-11 NOTE — PROGRESS NOTES
"  Physical Therapy Daily Treatment Note     Name: Gurpreet Youngblood  Clinic Number: [id number]    Therapy Diagnosis:   No diagnosis found.  Physician: KENY Aguirre III*    Visit Date: 7/11/2022    Evaluation Date: 5/20/2022  Authorization Period Expiration: 4/25/23  Plan of Care Expiration: 7/15/22  Progress Note Due: 7/15/22  Visit # / Visits authorized: 16/24  FOTO: 7/10 2nd complete 6/17/22    PTA Visit #: 0/5    Time In: 0812 am (pt late)  Time Out: 0900 am  Total Treatment Time: 48 minutes  Total Billable Time: 48 minutes (3 TE)     Precautions: Standard    Subjective     Pt reports: Pt reports he has no pain today and was late due to a phone call with his    He was partially compliant with home exercise program.  Response to previous treatment: no adverse effects  Functional change: ongoing    Pain: 0/10  Location: left low back       Objective     Functional Job Specific Testing:      Job Specific Task Job Demands Initial ability Current Ability Deficit?   (Yes or No)   1. Lifting suitcase  50lbs  10lb with pain 50lb with min pain 1x 40 ft, 65# deadlift yes   2. Standing  7 hrs  15 min Per FOTO: "I can stand as long as I want , but  it increases my pain." yes   3. Squatting  50x a day  15x with pain 30x 40 lbs with min pain  yes   4. Unilateral carry 50lbs  10lb with pain 40lbs x 300 ft        Palpation: TTP at left lumbar paraspinals, left transverse processes L3-5  Sensation: normal light touch      Range of Motion/Strength:      Lumbar AROM Pain/Dysfunction with Movement:   Flexion 60 mild left end range stretch    Extension 20     Right side bending 20    Left side bending 25*     Right rotation 75%     Left rotation 90%     *denotes pain with movement       L/E strength  Microfet(kg)/ MMT (*/5)  Right Left Pain/Dysfunction with Movement   Hip Flexion 18.3 kg 17.4 kg     Hip Abduction 11.3 kg 9.3 kg     Knee Flexion 12 kg 9.6 kg  double leg knee flexion 50lbs   Knee Extension 21.5 kg " "16.6 kg  double leg knee ext 40lbs   Ankle DF 5/5 5/5     Ankle PF 5/5 5/5     Hip extension 4+/5 4+/5  back pain left    Hip internal rotation  5/5 5/5     Hip external rotation  5/5 4+/5           Joint Mobility:   - Lumbar: decreased joint mobility throughout lumbar spine. Pain with P/A greatest L3-5  -Thoracic: decreased mobility with p/a throughout thoracic spine        Gurpreet received therapeutic exercises to develop strength, endurance, ROM, flexibility, posture and core stabilization for 48 minutes including:  Prone press ups: 3x10   Thoracic ext over 1/2 foam roll; 3 min   Open books: 10x  SL Bridges; alt 3 x 5 w/ 5" hold  Planks: 5x20"  Cybex leg press: seat 7 - 9 plates - 20x; BTB --> increase wt and decrease sets/reps next  Leg extensions cybex double leg 65lbs, 4 x 8 w/ 3" hold w/ 1 min rest b/t sets  cybex hs curls 8 plates 4 x 6 w/ 1 min rest b/t sets                                                                                                                                                                                                                                                                                                                                                                                                                                                                                                                                                                                                                                                                                                                                                             Not Today:  Pallof + overhead press: 20x - blue theraband   Bird dogs: 2x10  Static lunge  2x10 12# KB    Therapeutic activities for instruction of and practice of postural awareness and back protection with daily activity and lifting techniques for 00 minutes including:  Farmer's walk: 3 laps each upper extremity - 30#   Unilateral " overhead carry 10# 2 laps ea  Bag lift and carry 1x 75ft 50lbs     Not Today:  Overhead carry 3 laps 5# theraband  Deadlifts: 35# KB left side & 30# KB right side - 8x  Suitcase lift: 35# lbs 3x10 bilateral  Hip hinge with squat: 40lbs 3x10  Goblet squats: 35# KB - 2x10      Home Exercises Provided and Patient Education Provided     Education provided:   - as noted with Therapeutic Activity  - Importance of daily home exercise program performance  Given green and blue theraband for use with home exercise program on 6/15/2022    Written Home Exercises Provided: Patient instructed to cont prior HEP.  Exercises were reviewed and Gurpreet was able to demonstrate them prior to the end of the session.  Gurpreet demonstrated good  understanding of the education provided.     See EMR under Patient Instructions for exercises provided 5/20/2022.      Assessment     Gurpreet arrived somewhat late to session 2/2 phone call with . He was greatly challenged but exercise progressions today and will benefit increased loading and increased stability training. He is able to activate his core well 80% of attempts but struggles with consistency in this. Will progress as able.     Gurpreet Is progressing well towards his goals.   Pt prognosis is Good.     Pt will continue to benefit from skilled outpatient physical therapy to address the deficits listed in the problem list box on initial evaluation, provide pt/family education and to maximize pt's level of independence in the home and community environment.     Pt's spiritual, cultural and educational needs considered and pt agreeable to plan of care and goals.    Anticipated barriers to physical therapy: chronicity of current injury, objective abilities vs. self-perceived abilities and self-limiting behaviors due to pain    Goals:      Short Term Goals (3 Weeks):  1. Patient will be compliant with home exercise program to supplement therapy in promoting functional mobility. Met   2. Patient  will perform 12lb deadlift with good control to demonstrate improved core/LE strength. Met 6/17/22  3. Patient will report no pain during thoracolumbar active range of motion to promote functional mobility.  4. Patient will improve impaired lower extremity hamstring and hip strength by 3kg+ to improve strength for functional tasks.     Long Term Goals (8 Weeks):   1. Patient will improve FOTO score to </= 48% limited to decrease perceived limitation with maintaining/changing body position. Met 6/17/22  2. Patient will perform floor to waist lift with 45# Kettle bell with good control to demonstrate improved core and lower extremity  Strength to perform job duties   3. Patient will be able to perform 45 mins of standing exercises to improve tolerance for standing.   4. Patient will report no pain during 50x squatting promote tolerance for job duties.  5. Pt will return to work full duty, full time.      Plan     Continue PT per plan of care - progress with core stabilization and strengthening as able.    Nolvia Barker, PT, DPT

## 2022-07-13 ENCOUNTER — CLINICAL SUPPORT (OUTPATIENT)
Dept: REHABILITATION | Facility: HOSPITAL | Age: 32
End: 2022-07-13
Payer: OTHER GOVERNMENT

## 2022-07-13 DIAGNOSIS — M54.50 ACUTE BILATERAL LOW BACK PAIN WITHOUT SCIATICA: Primary | ICD-10-CM

## 2022-07-13 PROCEDURE — 97110 THERAPEUTIC EXERCISES: CPT | Mod: PN,CQ

## 2022-07-13 NOTE — PROGRESS NOTES
"  Physical Therapy Daily Treatment Note     [id number]    Therapy Diagnosis:   Encounter Diagnosis   Name Primary?    Acute bilateral low back pain without sciatica Yes     Physician: KENY Aguirre III*    Visit Date: 7/13/2022    Evaluation Date: 5/20/2022  Authorization Period Expiration: 4/25/23  Plan of Care Expiration: 7/15/22  Progress Note Due: 7/15/22  Visit # / Visits authorized: 17/24  FOTO: 7/10 2nd complete 6/17/22    PTA Visit #: 1/5    Time In: 1010 (late)  Time Out: 1045  Total Treatment Time: 48 minutes  Total Billable Time: 48 minutes (3 TE)     Precautions: Standard    Subjective     Pt reports: doing ok, has about a 1 pain   He was partially compliant with home exercise program.  Response to previous treatment: no adverse effects  Functional change: ongoing    Pain: 0/10  Location: left low back       Objective     Functional Job Specific Testing:      Job Specific Task Job Demands Initial ability Current Ability Deficit?   (Yes or No)   1. Lifting suitcase  50lbs  10lb with pain 50lb with min pain 1x 40 ft, 65# deadlift yes   2. Standing  7 hrs  15 min Per FOTO: "I can stand as long as I want , but  it increases my pain." yes   3. Squatting  50x a day  15x with pain 30x 40 lbs with min pain  yes   4. Unilateral carry 50lbs  10lb with pain 40lbs x 300 ft        Palpation: TTP at left lumbar paraspinals, left transverse processes L3-5  Sensation: normal light touch      Range of Motion/Strength:      Lumbar AROM Pain/Dysfunction with Movement:   Flexion 60 mild left end range stretch    Extension 20     Right side bending 20    Left side bending 25*     Right rotation 75%     Left rotation 90%     *denotes pain with movement       L/E strength  Microfet(kg)/ MMT (*/5)  Right Left Pain/Dysfunction with Movement   Hip Flexion 18.3 kg 17.4 kg     Hip Abduction 11.3 kg 9.3 kg     Knee Flexion 12 kg 9.6 kg  double leg knee flexion 50lbs   Knee Extension " "21.5 kg 16.6 kg  double leg knee ext 40lbs   Ankle DF 5/5 5/5     Ankle PF 5/5 5/5     Hip extension 4+/5 4+/5  back pain left    Hip internal rotation  5/5 5/5     Hip external rotation  5/5 4+/5           Joint Mobility:   - Lumbar: decreased joint mobility throughout lumbar spine. Pain with P/A greatest L3-5  -Thoracic: decreased mobility with p/a throughout thoracic spine        Gurpreet received therapeutic exercises to develop strength, endurance, ROM, flexibility, posture and core stabilization for 48 minutes including:  Prone press ups: 3x10   Thoracic ext over 1/2 foam roll; 3 min   Dead bug with ball x10 each  Open books: 10x  SL Bridges; alt 3 x 5 w/ 5" hold  Planks: 5x20"  Cybex leg press: seat 7 - 12plates 3x8  Leg extensions cybex double leg 65lbs, 4 x 8 w/ 3" hold w/ 1 min rest b/t sets  cybex hs curls 8 plates 4 x 6 w/ 1 min rest b/t sets                                                                                                                                                                                                                                                                                                                                                                                                                                                                                                                                                                                                                                                                                                                                                             Not Today:  Pallof + overhead press: 20x - blue theraband   Bird dogs: 2x10  Static lunge  2x10 12# KB    Therapeutic activities for instruction of and practice of postural awareness and back protection with daily activity and lifting techniques for 00 minutes including:  Farmer's walk: 3 laps each upper extremity - 30#   Unilateral overhead carry 10# " 2 laps ea  Bag lift and carry 1x 75ft 50lbs     Not Today:  Overhead carry 3 laps 5# theraband  Deadlifts: 35# KB left side & 30# KB right side - 8x  Suitcase lift: 35# lbs 3x10 bilateral  Hip hinge with squat: 40lbs 3x10  Goblet squats: 35# KB - 2x10      Home Exercises Provided and Patient Education Provided     Education provided:   - as noted with Therapeutic Activity  - Importance of daily home exercise program performance  Given green and blue angela for use with home exercise program on 6/15/2022    Written Home Exercises Provided: Patient instructed to cont prior HEP.  Exercises were reviewed and Gurpreet was able to demonstrate them prior to the end of the session.  Gurpreet demonstrated good  understanding of the education provided.     See EMR under Patient Instructions for exercises provided 5/20/2022.      Assessment     Gurpreet arrived somewhat late to session. Pt presents with minimal pain in low back. Pt fatigues quickly with core exercises, some increase low back pain. Cuing required to engage core. Increased hip strengthening with dynamic movements tolerated well. Pt able to progress with leg press to increase strengthening.    Gurpreet Is progressing well towards his goals.   Pt prognosis is Good.     Pt will continue to benefit from skilled outpatient physical therapy to address the deficits listed in the problem list box on initial evaluation, provide pt/family education and to maximize pt's level of independence in the home and community environment.     Pt's spiritual, cultural and educational needs considered and pt agreeable to plan of care and goals.    Anticipated barriers to physical therapy: chronicity of current injury, objective abilities vs. self-perceived abilities and self-limiting behaviors due to pain    Goals:      Short Term Goals (3 Weeks):  1. Patient will be compliant with home exercise program to supplement therapy in promoting functional mobility. Met   2. Patient will perform 12lb  deadlift with good control to demonstrate improved core/LE strength. Met 6/17/22  3. Patient will report no pain during thoracolumbar active range of motion to promote functional mobility.  4. Patient will improve impaired lower extremity hamstring and hip strength by 3kg+ to improve strength for functional tasks.     Long Term Goals (8 Weeks):   1. Patient will improve FOTO score to </= 48% limited to decrease perceived limitation with maintaining/changing body position. Met 6/17/22  2. Patient will perform floor to waist lift with 45# Kettle bell with good control to demonstrate improved core and lower extremity  Strength to perform job duties   3. Patient will be able to perform 45 mins of standing exercises to improve tolerance for standing.   4. Patient will report no pain during 50x squatting promote tolerance for job duties.  5. Pt will return to work full duty, full time.      Plan     Continue PT per plan of care - progress with core stabilization and strengthening as able.    Sebastian Goins, PTA

## 2022-07-25 ENCOUNTER — CLINICAL SUPPORT (OUTPATIENT)
Dept: REHABILITATION | Facility: HOSPITAL | Age: 32
End: 2022-07-25
Payer: OTHER GOVERNMENT

## 2022-07-25 DIAGNOSIS — M53.86 DECREASED ROM OF LUMBAR SPINE: ICD-10-CM

## 2022-07-25 DIAGNOSIS — M54.50 ACUTE BILATERAL LOW BACK PAIN WITHOUT SCIATICA: Primary | ICD-10-CM

## 2022-07-25 DIAGNOSIS — R53.1 DECREASED STRENGTH: ICD-10-CM

## 2022-07-25 PROCEDURE — 97110 THERAPEUTIC EXERCISES: CPT | Mod: PN

## 2022-07-25 PROCEDURE — 97530 THERAPEUTIC ACTIVITIES: CPT | Mod: PN

## 2022-07-25 NOTE — PLAN OF CARE
Physical Therapy plan of care       [id number]    Therapy Diagnosis:   Encounter Diagnoses   Name Primary?    Acute bilateral low back pain without sciatica Yes    Decreased ROM of lumbar spine     Decreased strength      Physician: KENY Aguirre III*    Visit Date: 7/25/2022    Evaluation Date: 5/20/2022  Authorization Period Expiration: 4/25/23  Plan of Care Expiration: 7/30/22  Progress Note Due: 7/30/22  Visit # / Visits authorized: 18/24  FOTO: 8/10 3rd complete 7/25/22  PTA Visit #: 1/5    Time In: 2:05  Time Out: 3:00  Total Treatment Time: 55 minutes  Total Billable Time: 55 minutes (4 TE)     Precautions: Standard    Subjective     Pt reports: doing about the same, good overall. He has 1-1.5/10 pain, never higher. He is able to lift up to 30 lbs per MD restrictions on 7/13/22 who recommended continuing to progress weight until full work duty weight achieved. Returns to MD 8/13/22 for follow up to determine readiness for return to work.    He was partially compliant with home exercise program.  Response to previous treatment: no adverse effects  Functional change: standing as long as desired, no pain with lifting    Pain: 1/10  Location: left low back       Objective     Functional Job Specific Testing:      Job Specific Task Job Demands Initial ability Current Ability Deficit?   (Yes or No)   1. Lifting suitcase  50-75lbs  10lb with pain 85lb double KB (40/45lb) 2x6  no   2. Standing  4 hrs, rotates every 30 mins  15 min No pain no   3. Squatting  50x a day  15x with pain 50x 45 lbs with min pain   Leg press 120 lbs 3x8 no   4. Unilateral carry 50lbs  10lb with pain 45lbs x 75ft  no      Palpation: mild TTP at left lumbar paraspinals, left transverse processes L3-4  Sensation: normal light touch      Range of Motion/Strength:      Lumbar AROM Pain/Dysfunction with Movement:   Flexion 60    Extension 20     Right side bending 30    Left side bending 35      Right rotation 90%     Left rotation 100     *denotes pain with movement       L/E strength  Microfet(kg)/ MMT (*/5)  Right Left Pain/Dysfunction with Movement   Hip Flexion 24.5 kg 21.7kg     Hip Abduction 20.3 kg 22 kg     Knee Flexion 14.5 kg 16.5 kg  double leg knee flexion 90lbs   Knee Extension 22.5 kg 16.9 kg  double leg knee ext 65lbs   Ankle DF 5/5 5/5     Ankle PF 5/5 5/5     Hip extension 4+/5 4+/5     Hip internal rotation  5/5 5/5     Hip external rotation  5/5 5/5           Joint Mobility:   - Lumbar: decreased joint mobility throughout lumbar spine. mild Pain with P/A  L3-4  -Thoracic: decreased mobility with p/a throughout thoracic spine    Home Exercises Provided and Patient Education Provided     Education provided:   - as noted with Therapeutic Activity  - Importance of daily home exercise program performance  Given green and blue theraband for use with home exercise program on 6/15/2022    Written Home Exercises Provided: Patient instructed to cont prior HEP.  Exercises were reviewed and Gurpreet was able to demonstrate them prior to the end of the session.  Gurpreet demonstrated good  understanding of the education provided.     See EMR under Patient Instructions for exercises provided 6/15/2022.      Assessment     Gurpreet presents without complaints of pain with his daily activities. He has been to 18 visits to date and has met all goals able to assess. Awaiting clearance for return to work following PT discharge. Pt demonstrates lumbar range of motion within functional limits, excellent improvements in lower extremity and core strength, improved body mechanics, and improved functional mobility. He is able to lift up to 85 lbs to simulate suitcase lift. Pt able to squat with 45lbs 50+ times without complaint of pain. He was able to carry 45-50lbs up to 300 feet without complaints of back pain. His job allows change in positions every 30 minutes which will decrease any pain he may experience with  prolonged standing, though currently able to stand for over an hour without pain. Pt has met/near met all goals. Pt is near ready for discharge and will be ready following final session with review of lifting, core utilization, and functional strengthening and update of HEP. Pt appears to be near ready for return to work full duty. Returns to MD around 8/13/22 to determine clearance/readiness.     Gurpreet Is progressing well towards his goals.   Pt prognosis is Good.     Pt will continue to benefit from skilled outpatient physical therapy to address the deficits listed in the problem list box on initial evaluation, provide pt/family education and to maximize pt's level of independence in the home and community environment.     Pt's spiritual, cultural and educational needs considered and pt agreeable to plan of care and goals.    Anticipated barriers to physical therapy: chronicity of current injury, objective abilities vs. self-perceived abilities and self-limiting behaviors due to pain    Goals:      Short Term Goals (3 Weeks):  1. Patient will be compliant with home exercise program to supplement therapy in promoting functional mobility. Met   2. Patient will perform 12lb deadlift with good control to demonstrate improved core/LE strength. Met 6/17/22  3. Patient will report no pain during thoracolumbar active range of motion to promote functional mobility. Progressing, not met  4. Patient will improve impaired lower extremity hamstring and hip strength by 3kg+ to improve strength for functional tasks. Met 7/25/22     Long Term Goals (8 Weeks):   1. Patient will improve FOTO score to </= 48% limited to decrease perceived limitation with maintaining/changing body position. Met 6/17/22  2. Patient will perform floor to waist lift with 45# Kettle bell with good control to demonstrate improved core and lower extremity  Strength to perform job duties. Met 7/25/22  3. Patient will be able to perform 45 mins of standing  exercises to improve tolerance for standing. Met 7/25/22  4. Patient will report no pain during 50x squatting promote tolerance for job duties. Met with 45Lbs   5. Pt will return to work full duty, full time.- unable to assess.      Plan     plan of care certification 7/25/22 -7/30/22 to complete remaining scheduled visits with emphasis on lifting mechanics prior to discharge at next visit.       DRU KIRBY, PT       (3) Within 24 hours

## 2022-07-28 ENCOUNTER — DOCUMENTATION ONLY (OUTPATIENT)
Dept: REHABILITATION | Facility: HOSPITAL | Age: 32
End: 2022-07-28
Payer: OTHER GOVERNMENT

## 2022-07-28 NOTE — PROGRESS NOTES
Physical therapist and physical therapy assistant(s) met face to face to discuss patient's treatment plan and progress towards established goals. Pt will be seen by a physical therapist minimally every 6th visit or every 30 days.    DRU KIRBY, PT, DPT

## 2022-08-17 ENCOUNTER — IMMUNIZATION (OUTPATIENT)
Dept: PHARMACY | Facility: CLINIC | Age: 32
End: 2022-08-17

## 2022-08-24 ENCOUNTER — DOCUMENTATION ONLY (OUTPATIENT)
Dept: REHABILITATION | Facility: HOSPITAL | Age: 32
End: 2022-08-24
Payer: OTHER GOVERNMENT

## 2022-08-24 NOTE — PROGRESS NOTES
Physical Therapy Discharge summary    Pt was evaluated on 22 and was seen 18 times for PT. Pt has not attended PT since 22. Patient given HEP. Plan of care and/or authorization . Pt to be discharged at this time. Pt had met all goals at previous plan of care     Yara Jacobo, PT, DPT

## 2022-09-20 ENCOUNTER — IMMUNIZATION (OUTPATIENT)
Dept: PHARMACY | Facility: CLINIC | Age: 32
End: 2022-09-20

## 2022-09-20 DIAGNOSIS — Z23 NEED FOR VACCINATION: Primary | ICD-10-CM

## 2023-09-28 ENCOUNTER — OFFICE VISIT (OUTPATIENT)
Dept: URGENT CARE | Facility: CLINIC | Age: 33
End: 2023-09-28
Payer: COMMERCIAL

## 2023-09-28 VITALS
DIASTOLIC BLOOD PRESSURE: 77 MMHG | OXYGEN SATURATION: 97 % | RESPIRATION RATE: 18 BRPM | WEIGHT: 149.94 LBS | TEMPERATURE: 98 F | BODY MASS INDEX: 24.1 KG/M2 | SYSTOLIC BLOOD PRESSURE: 124 MMHG | HEIGHT: 66 IN | HEART RATE: 81 BPM

## 2023-09-28 DIAGNOSIS — M79.671 FOOT PAIN, RIGHT: Primary | ICD-10-CM

## 2023-09-28 PROCEDURE — 99212 PR OFFICE/OUTPT VISIT, EST, LEVL II, 10-19 MIN: ICD-10-PCS | Mod: S$GLB,,, | Performed by: NURSE PRACTITIONER

## 2023-09-28 PROCEDURE — 73630 X-RAY EXAM OF FOOT: CPT | Mod: FY,RT,S$GLB, | Performed by: RADIOLOGY

## 2023-09-28 PROCEDURE — 99212 OFFICE O/P EST SF 10 MIN: CPT | Mod: S$GLB,,, | Performed by: NURSE PRACTITIONER

## 2023-09-28 PROCEDURE — 73630 XR FOOT COMPLETE 3 VIEW RIGHT: ICD-10-PCS | Mod: FY,RT,S$GLB, | Performed by: RADIOLOGY

## 2023-09-28 RX ORDER — DICLOFENAC SODIUM 50 MG/1
50 TABLET, DELAYED RELEASE ORAL 2 TIMES DAILY
Qty: 30 TABLET | Refills: 0 | Status: SHIPPED | OUTPATIENT
Start: 2023-09-28

## 2023-09-28 NOTE — PROGRESS NOTES
"Subjective:      Patient ID: Gurpreet Youngblood is a 33 y.o. male.    Vitals:  height is 5' 6" (1.676 m) and weight is 68 kg (149 lb 14.6 oz). His oral temperature is 98 °F (36.7 °C). His blood pressure is 124/77 and his pulse is 81. His respiration is 18 and oxygen saturation is 97%.     Chief Complaint: Foot Pain    Pt present to clinic with right foot pain. Stated was cutting a tree and Brach fell on  his foot happened yesterday. 07/10.     Foot Pain  This is a new problem. The current episode started yesterday. The problem occurs constantly. The problem has been gradually worsening. Associated symptoms include joint swelling. Associated symptoms comments: Tingling  . The symptoms are aggravated by standing and walking. He has tried nothing for the symptoms. The treatment provided no relief.       Musculoskeletal:  Positive for joint swelling.      Objective:     Physical Exam   HENT:   Head: Normocephalic.   Abdominal: Normal appearance.   Neurological: He is alert.   Skin: Skin is warm and dry.         Comments: + mild swelling of R foot       Assessment:     1. Foot pain, right        Plan:   Ace Wrapped applied to R foot in clinic    Foot pain, right  -     XR FOOT COMPLETE 3 VIEW RIGHT; Future; Expected date: 09/28/2023  -     diclofenac (VOLTAREN) 50 MG EC tablet; Take 1 tablet (50 mg total) by mouth 2 (two) times daily.  Dispense: 30 tablet; Refill: 0  -     BANDAGE ELASTIC 4IN ACE NS      Instruct on RICE              "

## 2025-02-21 NOTE — PROGRESS NOTES
Physical Therapy Daily Treatment Note      [facility]  [facility] Number: [id number]    Therapy Diagnosis:   Encounter Diagnoses   Name Primary?    Acute bilateral low back pain without sciatica Yes    Decreased ROM of lumbar spine     Decreased strength      Physician: KENY Aguirre III*    Visit Date: 7/25/2022    Evaluation Date: 5/20/2022  Authorization Period Expiration: 4/25/23  Plan of Care Expiration: 7/30/22  Progress Note Due: 7/30/22  Visit # / Visits authorized: 18/24  FOTO: 8/10 3rd complete 7/25/22  PTA Visit #: 1/5    Time In: 2:05  Time Out: 3:00  Total Treatment Time: 55 minutes  Total Billable Time: 55 minutes (2 TE, 2TA)     Precautions: Standard    Subjective     Pt reports: doing about the same, good overall. He has 1-1.5/10 pain, never higher. He is able to lift up to 30 lbs per MD restrictions on 7/13/22 who recommended continuing to progress weight until full work duty weight achieved. Returns to MD 8/13/22 for follow up to determine readiness for return to work.    He was partially compliant with home exercise program.  Response to previous treatment: no adverse effects  Functional change: standing as long as desired, no pain with lifting    Pain: 1/10  Location: left low back       Objective     Functional Job Specific Testing:      Job Specific Task Job Demands Initial ability Current Ability Deficit?   (Yes or No)   1. Lifting suitcase  50-75lbs  10lb with pain 85lb double KB (40/45lb) 2x6  no   2. Standing  4 hrs, rotates every 30 mins  15 min No pain no   3. Squatting  50x a day  15x with pain 50x 45 lbs with min pain   Leg press 120 lbs 3x8 no   4. Unilateral carry 50lbs  10lb with pain 45lbs x 75ft  no      Palpation: mild TTP at left lumbar paraspinals, left transverse processes L3-4  Sensation: normal light touch      Range of Motion/Strength:      Lumbar AROM Pain/Dysfunction with Movement:   Flexion 60    Extension 20     Right side bending 30    Left side  We can order lactose testing it is a little bit challenging to do in younger children as they have to eat certain food and then have a breath test done at the hospital.  It is not just a blood draw.  Typically in this age group I recommend stopping dairy for a few weeks to see how they do and then reintroducing to see if the diarrhea comes back so basically you can do your own home test.  In general we would prefer to see them first.  It is not an allergy test like other allergies as lactose intolerance is not an IgE mediated process therefore we cannot just do blood work.   "bending 35     Right rotation 90%     Left rotation 100     *denotes pain with movement       L/E strength  Microfet(kg)/ MMT (*/5)  Right Left Pain/Dysfunction with Movement   Hip Flexion 24.5 kg 21.7kg     Hip Abduction 20.3 kg 22 kg     Knee Flexion 14.5 kg 16.5 kg  double leg knee flexion 90lbs   Knee Extension 22.5 kg 16.9 kg  double leg knee ext 65lbs   Ankle DF 5/5 5/5     Ankle PF 5/5 5/5     Hip extension 4+/5 4+/5     Hip internal rotation  5/5 5/5     Hip external rotation  5/5 5/5           Joint Mobility:   - Lumbar: decreased joint mobility throughout lumbar spine. mild Pain with P/A  L3-4  -Thoracic: decreased mobility with p/a throughout thoracic spine    Treatment     Gurpreet received therapeutic exercises to develop strength, endurance, ROM, flexibility, posture and core stabilization for 25 minutes including:  Objective measures  Cybex leg press: seat 7 - 12 plates 3x8-10  Leg extensions cybex double leg 65lbs, 4 x 8 w/ 3" hold w/ 1 min rest b/t sets  cybex hs curls 9 plates 4 x 6 w/ 1 min rest b/t sets         Not today:      Prone press ups: 3x10 NP  Thoracic ext over 1/2 foam roll; 3 min NP  Dead bug with ball x10 each NP  Open books: 10x NP  SL Bridges; alt 3 x 5 w/ 5" hold NP  Planks: 5x20"                                                                                             Pallof + overhead press: 20x - blue theraband   Bird dogs: 2x10  Static lunge  2x10 12# KB    Therapeutic activities for instruction of and practice of postural awareness and back protection with daily activity and lifting techniques for 30 minutes including:  Farmer's walk: 3 laps each upper extremity - 30# NP  Unilateral farmers carry 45# x75 ft x2   Unilateral overhead carry 10# 2 laps ea  Deadlifts: 45 lbs 3x10  Suitcase lift: 85# lbs 2x6 bilateral  Hip hinge with squat: 45lbs 5x10  Goblet squats: 35# KB - 2x10 NP      Home Exercises Provided and Patient Education Provided     Education provided:   - as noted with " Therapeutic Activity  - Importance of daily home exercise program performance  Given green and blue theraband for use with home exercise program on 6/15/2022    Written Home Exercises Provided: Patient instructed to cont prior HEP.  Exercises were reviewed and Gurpreet was able to demonstrate them prior to the end of the session.  Gurpreet demonstrated good  understanding of the education provided.     See EMR under Patient Instructions for exercises provided 5/20/2022.      Assessment     Gurpreet presents without complaints of pain with his daily activities. He has been to 18 visits to date and has met all goals able to assess. Awaiting clearance for return to work following PT discharge. Pt demonstrates lumbar range of motion within functional limits, excellent improvements in lower extremity and core strength, improved body mechanics, and improved functional mobility. He is able to lift up to 85 lbs to simulate suitcase lift. Pt able to squat with 45lbs 50+ times without complaint of pain. He was able to carry 45-50lbs up to 300 feet without complaints of back pain. His job allows change in positions every 30 minutes which will decrease any pain he may experience with prolonged standing, though currently able to stand for over an hour without pain. Pt has met/near met all goals. Pt is near ready for discharge and will be ready following final session with review of lifting, core utilization, and functional strengthening and update of HEP. Pt appears to be near ready for return to work full duty. Returns to MD around 8/13/22 to determine clearance/readiness.     Gurpreet Is progressing well towards his goals.   Pt prognosis is Good.     Pt will continue to benefit from skilled outpatient physical therapy to address the deficits listed in the problem list box on initial evaluation, provide pt/family education and to maximize pt's level of independence in the home and community environment.     Pt's spiritual, cultural and  educational needs considered and pt agreeable to plan of care and goals.    Anticipated barriers to physical therapy: chronicity of current injury, objective abilities vs. self-perceived abilities and self-limiting behaviors due to pain    Goals:      Short Term Goals (3 Weeks):  1. Patient will be compliant with home exercise program to supplement therapy in promoting functional mobility. Met   2. Patient will perform 12lb deadlift with good control to demonstrate improved core/LE strength. Met 6/17/22  3. Patient will report no pain during thoracolumbar active range of motion to promote functional mobility. Progressing, not met  4. Patient will improve impaired lower extremity hamstring and hip strength by 3kg+ to improve strength for functional tasks. Met 7/25/22     Long Term Goals (8 Weeks):   1. Patient will improve FOTO score to </= 48% limited to decrease perceived limitation with maintaining/changing body position. Met 6/17/22  2. Patient will perform floor to waist lift with 45# Kettle bell with good control to demonstrate improved core and lower extremity  Strength to perform job duties. Met 7/25/22  3. Patient will be able to perform 45 mins of standing exercises to improve tolerance for standing. Met 7/25/22  4. Patient will report no pain during 50x squatting promote tolerance for job duties. Met with 45Lbs   5. Pt will return to work full duty, full time.- unable to assess.      Plan     plan of care certification 7/25/22 -7/30/22 to complete remaining scheduled visits with emphasis on lifting mechanics prior to discharge at next visit.       DRU KIRBY, PT

## 2025-03-26 ENCOUNTER — TELEPHONE (OUTPATIENT)
Dept: CARDIOLOGY | Facility: CLINIC | Age: 35
End: 2025-03-26
Payer: COMMERCIAL

## 2025-03-27 ENCOUNTER — OFFICE VISIT (OUTPATIENT)
Dept: CARDIOLOGY | Facility: CLINIC | Age: 35
End: 2025-03-27
Payer: COMMERCIAL

## 2025-03-27 VITALS — WEIGHT: 178 LBS | BODY MASS INDEX: 28.73 KG/M2 | SYSTOLIC BLOOD PRESSURE: 129 MMHG | DIASTOLIC BLOOD PRESSURE: 81 MMHG

## 2025-03-27 DIAGNOSIS — R94.31 NONSPECIFIC ABNORMAL ELECTROCARDIOGRAM (ECG) (EKG): Primary | ICD-10-CM

## 2025-03-27 PROCEDURE — 99999 PR PBB SHADOW E&M-EST. PATIENT-LVL II: CPT | Mod: PBBFAC,,, | Performed by: STUDENT IN AN ORGANIZED HEALTH CARE EDUCATION/TRAINING PROGRAM

## 2025-03-27 NOTE — PROGRESS NOTES
Cardiology Clinic Visit    History of Present Illness:       Gurpreet Youngblood is a pleasant 34 y.o. male with PMH noted below in assessment/plan presents for abnormal ECG. No CV complaints. No significant fhx of CV disease. ECG NSR with sinus arrhythmia (likely due to respiratory variations). Denies cp, sob, palpitations, presyncope/dizziness, syncope, orthopnea, PND, bendopnea, decrease ET, NVDC, fever, chills.      History obtained by patient interview and supplemented by nursing documentation and chart review.   PMHx:  has a past medical history of Anxiety, Concussion, Fatigue, Headache(784.0), HIV (human immunodeficiency virus infection), Immune deficiency disorder, and PTSD (post-traumatic stress disorder).   SurgHx:  has no past surgical history on file.   FamHx: family history includes Cancer in his maternal grandfather and maternal grandmother; Hypertension in his mother; No Known Problems in his brother and sister; Stroke in his maternal aunt and mother.   SocialHx:  reports that he has never smoked. He has never used smokeless tobacco. He reports that he does not drink alcohol and does not use drugs.  Home Meds:  Current Outpatient Medications   Medication Instructions    diclofenac (VOLTAREN) 50 mg, Oral, 2 times daily    ibuprofen (ADVIL,MOTRIN) 600 mg, Oral, Every 6 hours PRN    OMEPRAZOLE ORAL Take by mouth.    smallpox,monkeypox live vac PF (JYNNEOS) Susp Inject 0.1 ml by [facility]       Review of Systems: Comprehensive ROS was performed and is negative unless otherwise noted in HPI.   Objective   Objective/Exam:   /81 (BP Location: Left arm, Patient Position: Sitting)   Wt 80.7 kg (178 lb)   BMI 28.73 kg/m²    Wt Readings from Last 4 Encounters:   03/27/25 80.7 kg (178 lb)   09/28/23 68 kg (149 lb 14.6 oz)   05/21/21 68.9 kg (152 lb)   03/25/20 70.3 kg (155 lb)      Constitutional: NAD, Atraumatic, Conversant   HEENT: MMM, Sclera anicteric, No JVD   Cardiovascular: RRR, no murmurs noted, no  "chest tenderness to palpation, 2+ radial pulses b/l  Pulmonary: normal respiratory effort, CTAB, no crackles, wheezes  Abdominal: S/NT/ND  Musculoskeletal: No lower extremity edema noted b/l  Neurological: No gross neurological deficits  Skin: W/D/I  Psych: Appropriate affect, normal mood  Labs/Imaging/Procedures   Personally reviewed  Lab Results   Component Value Date     07/28/2019    K 3.4 (L) 07/28/2019     07/28/2019    CO2 27 07/28/2019    BUN 8 07/28/2019    CREATININE 1.0 07/28/2019    ANIONGAP 11 07/28/2019     No results found for: "HGBA1C"  No results found for: "BNP", "BNPTRIAGEBLO"   Lab Results   Component Value Date    WBC 4.61 07/28/2019    HGB 13.1 (L) 07/28/2019    HCT 38.2 (L) 07/28/2019     07/28/2019    GRAN 1.5 (L) 07/28/2019    GRAN 32.7 (L) 07/28/2019     No results found for: "CHOL", "HDL", "LDLCALC", "TRIG"  Lab Results   Component Value Date    TSH 0.680 04/11/2016     No results found for: "APOLIPOPROTE"  No results found for: "LIPOA"     TTE:  No results found for this or any previous visit.    Stress Testing:   No results found for this or any previous visit.     Coronary Angiogram:  No results found for this or any previous visit.  -Reviewing Medical records & lab results  -Independently reviewing and interpreting (if not documented by myself) EKGs, echocardiograms, catherizations   -Obtaining a history, performing a relevant exam, counseling/educating the patient/family  -Documenting clinical information in the EMR including ordering of tests  -Care coordination and communicating with other health care providers       Problem List:     1. Nonspecific abnormal electrocardiogram (ECG) (EKG)      Assessment/Plan:   Abnormal ECG - NSR with sinus arrhythmia (respiratory variations). No RBBB/LBBB. Will do echo for completion.       Preventative Care:  Lipids:  PVD(V/A)      Patient expressed verbal understanding and agreed with the plan     Return sooner for concerns or " questions. If symptoms persist go to the ED.  I have reviewed all pertinent data including patient's medical history in detail and updated the computerized patient record.     Total time spent counseling greater than fifty percent of total visit time.  Counseling included discussion regarding imaging findings, diagnosis, possibilities, treatment options, risks and benefits.      Thank you for the opportunity to care for this patient. Please don't hesitate to reach out with any questions/concerns         Armando Ambriz MD  Cardiovascular Disease; Interventional Cardiology  [facility]

## 2025-04-23 ENCOUNTER — RESULTS FOLLOW-UP (OUTPATIENT)
Dept: CARDIOLOGY | Facility: CLINIC | Age: 35
End: 2025-04-23

## 2025-04-23 ENCOUNTER — HOSPITAL ENCOUNTER (OUTPATIENT)
Dept: CARDIOLOGY | Facility: HOSPITAL | Age: 35
Discharge: HOME | End: 2025-04-23
Attending: STUDENT IN AN ORGANIZED HEALTH CARE EDUCATION/TRAINING PROGRAM
Payer: COMMERCIAL

## 2025-04-23 VITALS — BODY MASS INDEX: 28.61 KG/M2 | WEIGHT: 178 LBS | HEIGHT: 66 IN

## 2025-04-23 DIAGNOSIS — R94.31 NONSPECIFIC ABNORMAL ELECTROCARDIOGRAM (ECG) (EKG): ICD-10-CM

## 2025-04-23 LAB
APICAL FOUR CHAMBER EJECTION FRACTION: 55 %
APICAL TWO CHAMBER EJECTION FRACTION: 61 %
ASCENDING AORTA: 2.3 CM
AV INDEX (PROSTH): 0.79
AV MEAN GRADIENT: 3 MMHG
AV PEAK GRADIENT: 5 MMHG
AV VALVE AREA BY VELOCITY RATIO: 2.3 CM²
AV VALVE AREA: 2.5 CM²
AV VELOCITY RATIO: 0.73
BSA FOR ECHO PROCEDURE: 1.94 M2
CV ECHO LV RWT: 0.36 CM
DOP CALC AO PEAK VEL: 1.1 M/S
DOP CALC AO VTI: 21 CM
DOP CALC LVOT AREA: 3.1 CM2
DOP CALC LVOT DIAMETER: 2 CM
DOP CALC LVOT PEAK VEL: 0.8 M/S
DOP CALC LVOT STROKE VOLUME: 51.8 CM3
DOP CALC MV VTI: 24.1 CM
DOP CALCLVOT PEAK VEL VTI: 16.5 CM
E WAVE DECELERATION TIME: 198 MSEC
E/A RATIO: 1.4
E/E' RATIO: 6 M/S
ECHO LV POSTERIOR WALL: 0.8 CM (ref 0.6–1.1)
FRACTIONAL SHORTENING: 31.1 % (ref 28–44)
GLOBAL LONGITUIDAL STRAIN: 18.7 %
INTERVENTRICULAR SEPTUM: 0.8 CM (ref 0.6–1.1)
IVC DIAMETER: 1.31 CM
LEFT ATRIUM AREA SYSTOLIC (APICAL 2 CHAMBER): 18.3 CM2
LEFT ATRIUM AREA SYSTOLIC (APICAL 4 CHAMBER): 18.91 CM2
LEFT ATRIUM VOLUME INDEX MOD: 27 ML/M2
LEFT ATRIUM VOLUME MOD: 52 ML
LEFT INTERNAL DIMENSION IN SYSTOLE: 3.1 CM (ref 2.1–4)
LEFT VENTRICLE DIASTOLIC VOLUME INDEX: 47.37 ML/M2
LEFT VENTRICLE DIASTOLIC VOLUME: 90 ML
LEFT VENTRICLE END DIASTOLIC VOLUME APICAL 2 CHAMBER: 74.88 ML
LEFT VENTRICLE END DIASTOLIC VOLUME APICAL 4 CHAMBER: 74.33 ML
LEFT VENTRICLE END SYSTOLIC VOLUME APICAL 2 CHAMBER: 50.87 ML
LEFT VENTRICLE END SYSTOLIC VOLUME APICAL 4 CHAMBER: 53.57 ML
LEFT VENTRICLE MASS INDEX: 59.8 G/M2
LEFT VENTRICLE SYSTOLIC VOLUME INDEX: 19.5 ML/M2
LEFT VENTRICLE SYSTOLIC VOLUME: 37 ML
LEFT VENTRICULAR INTERNAL DIMENSION IN DIASTOLE: 4.5 CM (ref 3.5–6)
LEFT VENTRICULAR MASS: 113.6 G
LV LATERAL E/E' RATIO: 4.8 M/S
LV SEPTAL E/E' RATIO: 6.7 M/S
LVED V (TEICH): 89.86 ML
LVES V (TEICH): 36.98 ML
LVOT MG: 1.66 MMHG
LVOT MV: 0.61 CM/S
MV PEAK A VEL: 0.48 M/S
MV PEAK E VEL: 0.67 M/S
MV PEAK GRADIENT: 2 MMHG
MV STENOSIS PRESSURE HALF TIME: 57.54 MS
MV VALVE AREA BY CONTINUITY EQUATION: 2.15 CM2
MV VALVE AREA P 1/2 METHOD: 3.82 CM2
OHS CV RV/LV RATIO: 0.82 CM
OHS LV EJECTION FRACTION SIMPSONS BIPLANE MOD: 59 %
PISA MRMAX VEL: 3.83 M/S
PISA TR MAX VEL: 2.3 M/S
PULM VEIN S/D RATIO: 1.38
PV PEAK D VEL: 0.37 M/S
PV PEAK GRADIENT: 3 MMHG
PV PEAK S VEL: 0.51 M/S
PV PEAK VELOCITY: 0.82 M/S
RA PRESSURE ESTIMATED: 3 MMHG
RA VOL SYS: 47.06 ML
RIGHT ATRIAL AREA: 16.6 CM2
RIGHT ATRIUM VOLUME AREA LENGTH APICAL 4 CHAMBER: 44 ML
RIGHT VENTRICLE DIASTOLIC BASEL DIMENSION: 3.7 CM
RV TB RVSP: 5 MMHG
RV TISSUE DOPPLER FREE WALL SYSTOLIC VELOCITY 1 (APICAL 4 CHAMBER VIEW): 12.9 CM/S
SINUS: 3.02 CM
STJ: 2.4 CM
TDI LATERAL: 0.14 M/S
TDI SEPTAL: 0.1 M/S
TDI: 0.12 M/S
TR MAX PG: 22 MMHG
TRICUSPID ANNULAR PLANE SYSTOLIC EXCURSION: 2.3 CM
TV REST PULMONARY ARTERY PRESSURE: 24 MMHG
Z-SCORE OF LEFT VENTRICULAR DIMENSION IN END DIASTOLE: -1.7
Z-SCORE OF LEFT VENTRICULAR DIMENSION IN END SYSTOLE: -0.46

## 2025-04-23 PROCEDURE — 93306 TTE W/DOPPLER COMPLETE: CPT

## 2025-04-23 PROCEDURE — 93356 MYOCRD STRAIN IMG SPCKL TRCK: CPT | Mod: ,,, | Performed by: INTERNAL MEDICINE

## 2025-04-23 PROCEDURE — 93306 TTE W/DOPPLER COMPLETE: CPT | Mod: 26,,, | Performed by: INTERNAL MEDICINE

## 2025-07-07 ENCOUNTER — OFFICE VISIT (OUTPATIENT)
Dept: UROLOGY | Facility: CLINIC | Age: 35
End: 2025-07-07
Payer: COMMERCIAL

## 2025-07-07 VITALS
HEART RATE: 73 BPM | HEIGHT: 66 IN | SYSTOLIC BLOOD PRESSURE: 119 MMHG | BODY MASS INDEX: 28.61 KG/M2 | DIASTOLIC BLOOD PRESSURE: 80 MMHG | WEIGHT: 178 LBS

## 2025-07-07 DIAGNOSIS — R31.21 ASYMPTOMATIC MICROSCOPIC HEMATURIA: Primary | ICD-10-CM

## 2025-07-07 LAB
BILIRUB SERPL-MCNC: ABNORMAL MG/DL
BLOOD URINE, POC: ABNORMAL
CLARITY, POC UA: CLEAR
COLOR, POC UA: YELLOW
GLUCOSE UR QL STRIP: ABNORMAL
KETONES UR QL STRIP: ABNORMAL
LEUKOCYTE ESTERASE URINE, POC: ABNORMAL
NITRITE, POC UA: ABNORMAL
PH, POC UA: 7.5
PROTEIN, POC: ABNORMAL
SPECIFIC GRAVITY, POC UA: 1.01
UROBILINOGEN, POC UA: ABNORMAL

## 2025-07-07 PROCEDURE — 3074F SYST BP LT 130 MM HG: CPT | Mod: CPTII,S$GLB,,

## 2025-07-07 PROCEDURE — 99203 OFFICE O/P NEW LOW 30 MIN: CPT | Mod: S$GLB,,,

## 2025-07-07 PROCEDURE — 87086 URINE CULTURE/COLONY COUNT: CPT

## 2025-07-07 PROCEDURE — 81001 URINALYSIS AUTO W/SCOPE: CPT

## 2025-07-07 PROCEDURE — 3079F DIAST BP 80-89 MM HG: CPT | Mod: CPTII,S$GLB,,

## 2025-07-07 PROCEDURE — 1159F MED LIST DOCD IN RCRD: CPT | Mod: CPTII,S$GLB,,

## 2025-07-07 PROCEDURE — 99999 PR PBB SHADOW E&M-EST. PATIENT-LVL III: CPT | Mod: PBBFAC,,,

## 2025-07-07 PROCEDURE — 81002 URINALYSIS NONAUTO W/O SCOPE: CPT | Mod: S$GLB,,,

## 2025-07-07 PROCEDURE — 1160F RVW MEDS BY RX/DR IN RCRD: CPT | Mod: CPTII,S$GLB,,

## 2025-07-07 PROCEDURE — 3008F BODY MASS INDEX DOCD: CPT | Mod: CPTII,S$GLB,,

## 2025-07-07 RX ORDER — BICTEGRAVIR SODIUM, EMTRICITABINE, AND TENOFOVIR ALAFENAMIDE FUMARATE 50; 200; 25 MG/1; MG/1; MG/1
1 TABLET ORAL DAILY
COMMUNITY
Start: 2023-08-03

## 2025-07-07 RX ORDER — AMITRIPTYLINE HYDROCHLORIDE 10 MG/1
10-20 TABLET, FILM COATED ORAL NIGHTLY
COMMUNITY
Start: 2025-06-18

## 2025-07-07 NOTE — PROGRESS NOTES
"Subjective:       Patient ID: Gurpreet Youngblood is a 35 y.o. male.    Chief Complaint: Hematuria     Here for evaluation of microscopic hematuria. No recent UTIs. No personal or family history of urologic cancers. Denies any previous smoking history. Denies a previous history of kidney stones. Endorses a previous history of microscopic hematuria. Reports work-up with Dr. Lira in 2012, per patient was told he has an enlarged prostate.  He has never had gross hematuria. Denies LUTS.  12/2024-- 5 RBC  6/2025-- 25 RBC  Urine dipstick today in clinic- negative for Leukocytes, and nitrites, positive for RBC.     LAST PSA  No results found for: "PSA", "PSADIAG", "PSATOTAL", "PSAFREE"    Lab Results   Component Value Date    CREATININE 1.0 07/28/2019       ---  PMH/PSH/Medications/Allergies/Social history reviewed and as in chart.    Review of Systems   Constitutional:  Negative for activity change, chills and fever.   Respiratory:  Negative for shortness of breath.    Cardiovascular:  Negative for chest pain and palpitations.   Gastrointestinal:  Negative for abdominal pain and constipation.   Genitourinary:  Negative for difficulty urinating, dysuria, flank pain, frequency, hematuria and urgency.   Neurological:  Negative for dizziness and light-headedness.     Objective:      Physical Exam  HENT:      Head: Normocephalic.   Pulmonary:      Effort: Pulmonary effort is normal.   Musculoskeletal:         General: Normal range of motion.      Cervical back: Normal range of motion.   Skin:     General: Skin is warm and dry.   Neurological:      Mental Status: He is alert and oriented to person, place, and time.       Assessment:     Problem Noted   Asymptomatic Microscopic Hematuria 7/7/2025       Plan:     Urine sent for urine culture and UA micro. Discussed another work-up if greater than 4 RBC, patient voiced understanding  Follow-up pending urine specimen results      SEAMUS Guzman    I spent a total of 30 " minutes on the day of the visit.This includes face to face time and non-face to face time preparing to see the patient (eg, review of tests), obtaining and/or reviewing separately obtained history, documenting clinical information in the electronic or other health record, independently interpreting results and communicating results to the patient/family/caregiver, or care coordinator.

## 2025-07-08 DIAGNOSIS — R31.21 ASYMPTOMATIC MICROSCOPIC HEMATURIA: Primary | ICD-10-CM

## 2025-07-08 LAB
BACTERIA #/AREA URNS AUTO: ABNORMAL /HPF
MICROSCOPIC COMMENT: ABNORMAL
RBC #/AREA URNS AUTO: 35 /HPF (ref 0–4)
WBC #/AREA URNS AUTO: 1 /HPF (ref 0–5)

## 2025-07-09 LAB — BACTERIA UR CULT: NORMAL

## 2025-07-23 ENCOUNTER — TELEPHONE (OUTPATIENT)
Dept: UROLOGY | Facility: CLINIC | Age: 35
End: 2025-07-23
Payer: COMMERCIAL

## 2025-07-23 NOTE — TELEPHONE ENCOUNTER
Copied from CRM #[id number]. Topic: General Inquiry - Patient Advice  >> Jul 23, 2025 10:47 AM Ayleen wrote:  Type:  Needs Medical Advice    Who Called: Patient   Symptoms (please be specific): **cysto/ambu/mirco hematuria   How long has patient had these symptoms:    Pharmacy name and phone #:    Would the patient rather a call back or a response via [facility]? call  Best Call Back Number: [phone]  Additional Information: Patient would like a call from nurse in office regarding possible changing schedule appointment that is set for 07/24/2025 at 9 am to Friday 07/25/2025 any time this day please call regarding

## 2025-07-24 ENCOUNTER — PROCEDURE VISIT (OUTPATIENT)
Dept: UROLOGY | Facility: CLINIC | Age: 35
End: 2025-07-24
Payer: COMMERCIAL

## 2025-07-24 VITALS
HEART RATE: 96 BPM | WEIGHT: 170 LBS | DIASTOLIC BLOOD PRESSURE: 73 MMHG | HEIGHT: 66 IN | BODY MASS INDEX: 27.32 KG/M2 | SYSTOLIC BLOOD PRESSURE: 111 MMHG

## 2025-07-24 DIAGNOSIS — R31.21 ASYMPTOMATIC MICROSCOPIC HEMATURIA: ICD-10-CM

## 2025-07-24 PROCEDURE — 52000 CYSTOURETHROSCOPY: CPT | Mod: S$GLB,,, | Performed by: UROLOGY

## 2025-07-24 PROCEDURE — 99499 UNLISTED E&M SERVICE: CPT | Mod: S$GLB,,, | Performed by: UROLOGY

## 2025-07-24 NOTE — PROCEDURES
Cystoscopy Procedure Note    Procedure:   Flexible cysto-uretheroscopy    Pre Procedure Diagnosis:  microscopic hematuria    Post Procedure Diagnosis:  microscopic hematuria    Surgeon: Omid Peters MD     Anesthesia: 2% uro-jet lidocaine jelly for local analgesia    Procedure note:  A flexible cysto-urethroscopy was performed after consent was obtained. Prior to procedure a timeout was performed and the correct patient, procedure, and site was verified  The risks and benefits were explained. 2% lidocaine urojet was used for local analgesia. The genitalia was prepped and draped in the sterile fashion.     The flexible scope was advanced into the urethra and into the bladder. There were no urethral strictures noted throughout the course of the urethra. during scope passage.     The bladder was completely surveyed in a systematic fashion. The bilateral ureteral orifices were identified in their normal orthotopic locations with efflux noted bilaterally. The remained of the bladder was evaluated. No bladder tumors or lesions suspicious for malignancy were seen.     Upon retroflexion there was no significant median lobe enlargement. As the flexible cystoscope was being withdrawn, the prostate was evaluated carefully. The lateral lobes of the prostate were not significantly enlarged.     The patient tolerated the procedure well without complication.     Plan:  - CT urogram reviewed, negative  - Microheme workup negative  - F/u PRN    Omid Peters MD  Urology  [facility]

## 2025-08-26 ENCOUNTER — OFFICE VISIT (OUTPATIENT)
Dept: UROLOGY | Facility: CLINIC | Age: 35
End: 2025-08-26
Payer: COMMERCIAL

## 2025-08-26 VITALS — DIASTOLIC BLOOD PRESSURE: 70 MMHG | HEART RATE: 61 BPM | OXYGEN SATURATION: 100 % | SYSTOLIC BLOOD PRESSURE: 119 MMHG

## 2025-08-26 DIAGNOSIS — R31.21 ASYMPTOMATIC MICROSCOPIC HEMATURIA: Primary | ICD-10-CM

## 2025-08-26 DIAGNOSIS — Z87.438 HISTORY OF BPH: ICD-10-CM

## 2025-08-26 PROCEDURE — 3074F SYST BP LT 130 MM HG: CPT | Mod: CPTII,S$GLB,, | Performed by: UROLOGY

## 2025-08-26 PROCEDURE — 3078F DIAST BP <80 MM HG: CPT | Mod: CPTII,S$GLB,, | Performed by: UROLOGY

## 2025-08-26 PROCEDURE — 99999 PR PBB SHADOW E&M-EST. PATIENT-LVL III: CPT | Mod: PBBFAC,,, | Performed by: UROLOGY

## 2025-08-26 PROCEDURE — 99214 OFFICE O/P EST MOD 30 MIN: CPT | Mod: S$GLB,,, | Performed by: UROLOGY

## 2025-08-26 PROCEDURE — 88120 CYTP URNE 3-5 PROBES EA SPEC: CPT | Performed by: UROLOGY

## 2025-08-26 PROCEDURE — 88112 CYTOPATH CELL ENHANCE TECH: CPT | Mod: TC | Performed by: UROLOGY

## 2025-08-26 PROCEDURE — 1159F MED LIST DOCD IN RCRD: CPT | Mod: CPTII,S$GLB,, | Performed by: UROLOGY

## 2025-08-26 PROCEDURE — 1160F RVW MEDS BY RX/DR IN RCRD: CPT | Mod: CPTII,S$GLB,, | Performed by: UROLOGY

## 2025-08-27 LAB
ESTROGEN SERPL-MCNC: NORMAL PG/ML
INSULIN SERPL-ACNC: NORMAL U[IU]/ML
LAB AP GROSS DESCRIPTION: NORMAL
LAB AP PERFORMING LOCATION(S): NORMAL
LAB AP URINE CYTOLOGY INTERPRETATION SPECIMEN 1: NORMAL

## 2025-08-29 ENCOUNTER — TELEPHONE (OUTPATIENT)
Dept: UROLOGY | Facility: CLINIC | Age: 35
End: 2025-08-29
Payer: COMMERCIAL